# Patient Record
Sex: FEMALE | Race: WHITE | NOT HISPANIC OR LATINO | Employment: OTHER | URBAN - METROPOLITAN AREA
[De-identification: names, ages, dates, MRNs, and addresses within clinical notes are randomized per-mention and may not be internally consistent; named-entity substitution may affect disease eponyms.]

---

## 2017-07-27 PROBLEM — E66.01 SEVERE OBESITY (BMI >= 40): Status: ACTIVE | Noted: 2017-07-27

## 2017-07-27 PROBLEM — R07.82 INTERCOSTAL PAIN: Status: ACTIVE | Noted: 2017-07-27

## 2017-07-27 PROBLEM — J18.9 PNEUMONIA DUE TO INFECTIOUS ORGANISM: Status: ACTIVE | Noted: 2017-07-27

## 2017-07-27 PROBLEM — J18.9 COMMUNITY ACQUIRED PNEUMONIA: Status: RESOLVED | Noted: 2017-07-27 | Resolved: 2017-07-27

## 2017-07-27 PROBLEM — J44.9 COPD (CHRONIC OBSTRUCTIVE PULMONARY DISEASE): Status: ACTIVE | Noted: 2017-07-27

## 2017-07-27 PROBLEM — J18.9 COMMUNITY ACQUIRED PNEUMONIA: Status: ACTIVE | Noted: 2017-07-27

## 2017-07-29 PROBLEM — C34.91 ADENOCARCINOMA OF RIGHT LUNG: Status: ACTIVE | Noted: 2017-07-29

## 2017-12-14 PROBLEM — J18.9 PNEUMONIA OF LEFT LUNG DUE TO INFECTIOUS ORGANISM: Status: ACTIVE | Noted: 2017-12-14

## 2023-09-19 ENCOUNTER — HOSPITAL ENCOUNTER (OUTPATIENT)
Dept: RADIOLOGY | Facility: HOSPITAL | Age: 83
Discharge: HOME | End: 2023-09-19
Attending: EMERGENCY MEDICINE
Payer: MEDICARE

## 2023-09-19 DIAGNOSIS — J44.9 CHRONIC OBSTRUCTIVE PULMONARY DISEASE, UNSPECIFIED COPD TYPE: ICD-10-CM

## 2023-09-19 DIAGNOSIS — C34.92 ADENOCARCINOMA OF LEFT LUNG: ICD-10-CM

## 2023-09-19 DIAGNOSIS — R91.8 LUNG MASS: ICD-10-CM

## 2023-09-19 LAB — GLUCOSE SERPL-MCNC: 114 MG/DL (ref 70–110)

## 2023-09-19 PROCEDURE — A9552 F18 FDG: HCPCS | Mod: PN

## 2023-09-19 PROCEDURE — 78815 NM PET CT ROUTINE: ICD-10-PCS | Mod: 26,PI,, | Performed by: RADIOLOGY

## 2023-09-19 PROCEDURE — 78815 PET IMAGE W/CT SKULL-THIGH: CPT | Mod: 26,PI,, | Performed by: RADIOLOGY

## 2023-09-19 NOTE — PROGRESS NOTES
PET Imaging Questionnaire    Are you a Diabetic? Recent Blood Sugar level? No    Are you anemic? Bone Marrow Stimulation Meds? No    Have you had a CT Scan, if so when & where was your last one? Yes -     Have you had a PET Scan, if so when & where was your last one? Yes -     Chemotherapy or currently on Chemotherapy? No    Radiation therapy? Yes    Surgical History:   Past Surgical History:   Procedure Laterality Date    APPENDECTOMY      BACK SURGERY      CORONARY ANGIOPLASTY WITH STENT PLACEMENT      HYSTERECTOMY      OTHER SURGICAL HISTORY      Abdominal Aneurysm    TONSILLECTOMY          Have you been fasting for at least 6 hours? Yes    Is there any chance you may be pregnant or breastfeeding? No    Assay: 12.48 MCi@:10:36   Injection Site:RT Arm    Residual: .773 mCi@: 10:38   Technologist: Konstantin Rodriguez Injected:11.71 mCi

## 2024-02-21 PROBLEM — E66.01 SEVERE OBESITY (BMI >= 40): Status: RESOLVED | Noted: 2017-07-27 | Resolved: 2024-02-21

## 2024-07-09 PROBLEM — I50.32 CHRONIC HEART FAILURE WITH PRESERVED EJECTION FRACTION: Status: ACTIVE | Noted: 2024-07-09

## 2024-07-09 PROBLEM — R07.9 CHEST PAIN: Status: ACTIVE | Noted: 2024-07-09

## 2024-07-18 PROBLEM — I50.30 DIASTOLIC CONGESTIVE HEART FAILURE: Status: ACTIVE | Noted: 2024-07-18

## 2024-07-19 PROBLEM — R07.9 CHEST PAIN: Status: RESOLVED | Noted: 2024-07-09 | Resolved: 2024-07-19

## 2024-08-08 PROBLEM — F33.41 RECURRENT MAJOR DEPRESSIVE DISORDER, IN PARTIAL REMISSION: Status: ACTIVE | Noted: 2024-08-08

## 2024-08-21 PROBLEM — M41.9 SCOLIOSIS OF THORACOLUMBAR SPINE: Status: ACTIVE | Noted: 2024-08-21

## 2024-12-01 ENCOUNTER — HOSPITAL ENCOUNTER (EMERGENCY)
Facility: HOSPITAL | Age: 84
Discharge: HOME | End: 2024-12-01
Attending: EMERGENCY MEDICINE
Payer: MEDICARE

## 2024-12-01 VITALS
BODY MASS INDEX: 28.27 KG/M2 | OXYGEN SATURATION: 96 % | WEIGHT: 169.69 LBS | DIASTOLIC BLOOD PRESSURE: 62 MMHG | TEMPERATURE: 98 F | HEIGHT: 65 IN | SYSTOLIC BLOOD PRESSURE: 134 MMHG | RESPIRATION RATE: 18 BRPM | HEART RATE: 76 BPM

## 2024-12-01 DIAGNOSIS — R53.1 WEAKNESS: ICD-10-CM

## 2024-12-01 DIAGNOSIS — C34.92 PRIMARY ADENOCARCINOMA OF LEFT LUNG: ICD-10-CM

## 2024-12-01 DIAGNOSIS — S32.030A COMPRESSION FRACTURE OF L3 VERTEBRA, INITIAL ENCOUNTER: Primary | ICD-10-CM

## 2024-12-01 DIAGNOSIS — G89.29 CHRONIC MIDLINE BACK PAIN, UNSPECIFIED BACK LOCATION: ICD-10-CM

## 2024-12-01 DIAGNOSIS — M54.9 CHRONIC MIDLINE BACK PAIN, UNSPECIFIED BACK LOCATION: ICD-10-CM

## 2024-12-01 LAB
ALBUMIN SERPL BCP-MCNC: 3.5 G/DL (ref 3.5–5.2)
ALP SERPL-CCNC: 127 U/L (ref 40–150)
ALT SERPL W/O P-5'-P-CCNC: 6 U/L (ref 10–44)
ANION GAP SERPL CALC-SCNC: 12 MMOL/L (ref 8–16)
AST SERPL-CCNC: 16 U/L (ref 10–40)
BACTERIA #/AREA URNS HPF: ABNORMAL /HPF
BASOPHILS # BLD AUTO: 0.03 K/UL (ref 0–0.2)
BASOPHILS NFR BLD: 0.2 % (ref 0–1.9)
BILIRUB SERPL-MCNC: 1.1 MG/DL (ref 0.1–1)
BILIRUB UR QL STRIP: NEGATIVE
BUN SERPL-MCNC: 24 MG/DL (ref 8–23)
CALCIUM SERPL-MCNC: 9.1 MG/DL (ref 8.7–10.5)
CHLORIDE SERPL-SCNC: 104 MMOL/L (ref 95–110)
CLARITY UR: ABNORMAL
CO2 SERPL-SCNC: 20 MMOL/L (ref 23–29)
COLOR UR: YELLOW
CREAT SERPL-MCNC: 1.1 MG/DL (ref 0.5–1.4)
CRP SERPL-MCNC: 118.2 MG/L (ref 0–8.2)
DIFFERENTIAL METHOD BLD: ABNORMAL
EOSINOPHIL # BLD AUTO: 0.1 K/UL (ref 0–0.5)
EOSINOPHIL NFR BLD: 0.8 % (ref 0–8)
ERYTHROCYTE [DISTWIDTH] IN BLOOD BY AUTOMATED COUNT: 14.8 % (ref 11.5–14.5)
EST. GFR  (NO RACE VARIABLE): 49 ML/MIN/1.73 M^2
GLUCOSE SERPL-MCNC: 170 MG/DL (ref 70–110)
GLUCOSE UR QL STRIP: NEGATIVE
HCT VFR BLD AUTO: 39 % (ref 37–48.5)
HCV AB SERPL QL IA: NEGATIVE
HEP C VIRUS HOLD SPECIMEN: NORMAL
HGB BLD-MCNC: 12.8 G/DL (ref 12–16)
HGB UR QL STRIP: NEGATIVE
HIV 1+2 AB+HIV1 P24 AG SERPL QL IA: NEGATIVE
IMM GRANULOCYTES # BLD AUTO: 0.08 K/UL (ref 0–0.04)
IMM GRANULOCYTES NFR BLD AUTO: 0.6 % (ref 0–0.5)
KETONES UR QL STRIP: NEGATIVE
LEUKOCYTE ESTERASE UR QL STRIP: ABNORMAL
LYMPHOCYTES # BLD AUTO: 0.3 K/UL (ref 1–4.8)
LYMPHOCYTES NFR BLD: 2.3 % (ref 18–48)
MCH RBC QN AUTO: 26.9 PG (ref 27–31)
MCHC RBC AUTO-ENTMCNC: 32.8 G/DL (ref 32–36)
MCV RBC AUTO: 82 FL (ref 82–98)
MICROSCOPIC COMMENT: ABNORMAL
MONOCYTES # BLD AUTO: 0.6 K/UL (ref 0.3–1)
MONOCYTES NFR BLD: 4.3 % (ref 4–15)
NEUTROPHILS # BLD AUTO: 13 K/UL (ref 1.8–7.7)
NEUTROPHILS NFR BLD: 91.8 % (ref 38–73)
NITRITE UR QL STRIP: NEGATIVE
NRBC BLD-RTO: 0 /100 WBC
PH UR STRIP: 6 [PH] (ref 5–8)
PLATELET # BLD AUTO: 164 K/UL (ref 150–450)
PMV BLD AUTO: 10.2 FL (ref 9.2–12.9)
POTASSIUM SERPL-SCNC: 4.2 MMOL/L (ref 3.5–5.1)
PROT SERPL-MCNC: 6.9 G/DL (ref 6–8.4)
PROT UR QL STRIP: ABNORMAL
RBC # BLD AUTO: 4.76 M/UL (ref 4–5.4)
RBC #/AREA URNS HPF: 6 /HPF (ref 0–4)
SODIUM SERPL-SCNC: 136 MMOL/L (ref 136–145)
SP GR UR STRIP: 1.02 (ref 1–1.03)
SQUAMOUS #/AREA URNS HPF: 14 /HPF
URN SPEC COLLECT METH UR: ABNORMAL
UROBILINOGEN UR STRIP-ACNC: NEGATIVE EU/DL
WBC # BLD AUTO: 14.14 K/UL (ref 3.9–12.7)
WBC #/AREA URNS HPF: 33 /HPF (ref 0–5)

## 2024-12-01 PROCEDURE — 96375 TX/PRO/DX INJ NEW DRUG ADDON: CPT

## 2024-12-01 PROCEDURE — 25500020 PHARM REV CODE 255: Performed by: FAMILY MEDICINE

## 2024-12-01 PROCEDURE — 96374 THER/PROPH/DIAG INJ IV PUSH: CPT

## 2024-12-01 PROCEDURE — 99285 EMERGENCY DEPT VISIT HI MDM: CPT | Mod: 25

## 2024-12-01 PROCEDURE — 86803 HEPATITIS C AB TEST: CPT | Performed by: EMERGENCY MEDICINE

## 2024-12-01 PROCEDURE — 87086 URINE CULTURE/COLONY COUNT: CPT | Performed by: EMERGENCY MEDICINE

## 2024-12-01 PROCEDURE — 63600175 PHARM REV CODE 636 W HCPCS: Performed by: EMERGENCY MEDICINE

## 2024-12-01 PROCEDURE — 87389 HIV-1 AG W/HIV-1&-2 AB AG IA: CPT | Performed by: EMERGENCY MEDICINE

## 2024-12-01 PROCEDURE — 63600175 PHARM REV CODE 636 W HCPCS: Performed by: FAMILY MEDICINE

## 2024-12-01 PROCEDURE — 85025 COMPLETE CBC W/AUTO DIFF WBC: CPT | Performed by: EMERGENCY MEDICINE

## 2024-12-01 PROCEDURE — 86140 C-REACTIVE PROTEIN: CPT | Performed by: EMERGENCY MEDICINE

## 2024-12-01 PROCEDURE — 81000 URINALYSIS NONAUTO W/SCOPE: CPT | Performed by: EMERGENCY MEDICINE

## 2024-12-01 PROCEDURE — 80053 COMPREHEN METABOLIC PANEL: CPT | Performed by: EMERGENCY MEDICINE

## 2024-12-01 RX ORDER — PROMETHAZINE HYDROCHLORIDE 25 MG/ML
12.5 INJECTION, SOLUTION INTRAMUSCULAR; INTRAVENOUS
Status: DISCONTINUED | OUTPATIENT
Start: 2024-12-01 | End: 2024-12-01

## 2024-12-01 RX ORDER — KETOROLAC TROMETHAMINE 30 MG/ML
15 INJECTION, SOLUTION INTRAMUSCULAR; INTRAVENOUS
Status: COMPLETED | OUTPATIENT
Start: 2024-12-01 | End: 2024-12-01

## 2024-12-01 RX ORDER — HYDROCODONE BITARTRATE AND ACETAMINOPHEN 10; 325 MG/1; MG/1
1 TABLET ORAL EVERY 6 HOURS PRN
Qty: 12 TABLET | Refills: 0 | Status: SHIPPED | OUTPATIENT
Start: 2024-12-01 | End: 2024-12-02 | Stop reason: ALTCHOICE

## 2024-12-01 RX ORDER — MORPHINE SULFATE 4 MG/ML
6 INJECTION, SOLUTION INTRAMUSCULAR; INTRAVENOUS
Status: COMPLETED | OUTPATIENT
Start: 2024-12-01 | End: 2024-12-01

## 2024-12-01 RX ORDER — METHYLPREDNISOLONE SOD SUCC 125 MG
125 VIAL (EA) INJECTION
Status: COMPLETED | OUTPATIENT
Start: 2024-12-01 | End: 2024-12-01

## 2024-12-01 RX ORDER — HYDROCODONE BITARTRATE AND ACETAMINOPHEN 10; 325 MG/1; MG/1
1 TABLET ORAL
Status: DISCONTINUED | OUTPATIENT
Start: 2024-12-01 | End: 2024-12-01

## 2024-12-01 RX ORDER — MORPHINE SULFATE 4 MG/ML
8 INJECTION, SOLUTION INTRAMUSCULAR; INTRAVENOUS
Status: DISCONTINUED | OUTPATIENT
Start: 2024-12-01 | End: 2024-12-01

## 2024-12-01 RX ORDER — ONDANSETRON HYDROCHLORIDE 2 MG/ML
4 INJECTION, SOLUTION INTRAVENOUS
Status: COMPLETED | OUTPATIENT
Start: 2024-12-01 | End: 2024-12-01

## 2024-12-01 RX ADMIN — MORPHINE SULFATE 6 MG: 4 INJECTION INTRAVENOUS at 03:12

## 2024-12-01 RX ADMIN — ONDANSETRON 4 MG: 2 INJECTION INTRAMUSCULAR; INTRAVENOUS at 03:12

## 2024-12-01 RX ADMIN — METHYLPREDNISOLONE SODIUM SUCCINATE 125 MG: 125 INJECTION, POWDER, FOR SOLUTION INTRAMUSCULAR; INTRAVENOUS at 10:12

## 2024-12-01 RX ADMIN — KETOROLAC TROMETHAMINE 15 MG: 30 INJECTION, SOLUTION INTRAMUSCULAR at 08:12

## 2024-12-01 RX ADMIN — IOHEXOL 100 ML: 350 INJECTION, SOLUTION INTRAVENOUS at 07:12

## 2024-12-01 NOTE — ED PROVIDER NOTES
SCRIBE #1 NOTE: IGlo, am scribing for, and in the presence of, John An MD. I have scribed the HPI, ROS, PEx.     SCRIBE #2 NOTE: IJose, am scribing for, and in the presence of,  Nusrat Barbosa MD. I have scribed the remaining portions of the note not scribed by Scribe #1.      History     Chief Complaint   Patient presents with    Leg Pain     Pt complaining of chronic bilateral leg pain. Pt's family reports supposed to be in pain management for steroid injections however miscommunication from PCP and pain management so x1 month and still no relief      Review of patient's allergies indicates:   Allergen Reactions    Codeine Nausea Only    Adhesive Itching and Blisters     tape  tape    Latex, natural rubber Itching         History of Present Illness     HPI    12/1/2024, 3:16 AM  History obtained from the patient      History of Present Illness: Katrina Duran is a 84 y.o. female patient with a PMHx of adenocarcinoma of left lung, CAD, heart attack, abdominal aortic aneurysm s/p stent placement, COPD, and HTN who presents to the Emergency Department for evaluation of constant, moderate lumbar back pain that radiates to the BLE which onset several months PTA, worse over the past few days. This pain is exacerbated with standing or walking. The patient has not been able to walk over the past 5 days. She denies any falls or trauma. She has seen her PCP, an Orthopedist, and Pain Management for this complaint. She has prescriptions for Gabapentin and Tramadol, which have not been helping. She has a follow-up appointment with Pain Management next week. Patient denies any bowel or bladder incontinence, dysuria, CP, and all other sxs at this time. No further complaints or concerns at this time.       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    COPD (chronic obstructive pulmonary disease)     Coronary artery disease     Essential (primary) hypertension 12/15/2005    Heart attack         Mass of lower lobe of right lung 2016    Simple chronic bronchitis 2016       Past Surgical History:  Past Surgical History:   Procedure Laterality Date    APPENDECTOMY      BACK SURGERY      CORONARY ANGIOPLASTY WITH STENT PLACEMENT      HYSTERECTOMY      OTHER SURGICAL HISTORY      Abdominal Aneurysm    TONSILLECTOMY           Family History:  Family History   Problem Relation Name Age of Onset    Cancer Sister         Social History:  Social History     Tobacco Use    Smoking status: Former     Current packs/day: 0.00     Types: Cigarettes     Quit date: 1999     Years since quittin.9    Smokeless tobacco: Never   Substance and Sexual Activity    Alcohol use: No    Drug use: No    Sexual activity: Never        Review of Systems     Review of Systems   Constitutional:  Negative for fever.   HENT:  Negative for sore throat.    Respiratory:  Negative for shortness of breath.    Cardiovascular:  Negative for chest pain.   Gastrointestinal:  Negative for nausea.        [-] bowel incontinence   Genitourinary:  Negative for dysuria.        [-] bladder incontinence   Musculoskeletal:  Positive for back pain (lumbar).   Skin:  Negative for rash.   Neurological:  Negative for weakness.   Hematological:  Does not bruise/bleed easily.   All other systems reviewed and are negative.     Physical Exam     Initial Vitals [24 0257]   BP Pulse Resp Temp SpO2   (!) 165/76 93 18 98 °F (36.7 °C) 97 %      MAP       --          Physical Exam  Nursing Notes and Vital Signs Reviewed.  Constitutional: Patient is in moderate distress. Well-developed and well-nourished.  Head: Atraumatic. Normocephalic.  Eyes: PERRL. EOM intact. Conjunctivae are not  pale. No scleral icterus.  ENT: Mucous membranes are moist. Oropharynx is clear and symmetric.    Neck: Supple. Full ROM. No lymphadenopathy.  Cardiovascular: Regular rate. Regular rhythm. No murmurs, rubs, or gallops. Distal pulses are 2+ and symmetric.  Pulmonary/Chest: No respiratory distress. Clear to auscultation bilaterally. No wheezing or rales.  Abdominal: Soft and non-distended.  There is no tenderness.  No rebound, guarding, or rigidity. Good bowel sounds.  Genitourinary: No CVA tenderness  Musculoskeletal: Moves all extremities. No obvious deformities. No edema. No calf tenderness.  Skin: Warm and dry.  Neurological:  Alert, awake, and appropriate.  Normal speech.  Positive straight leg raise on the right. No strength deficit; equal and 5/5 in bilateral upper and lower extremities. No acute focal neurological deficits are appreciated.  Psychiatric: Normal affect. Good eye contact. Appropriate in content.    Unable to perform back exam as the patient is unable to sit up or turn over due to pain.      ED Course   Critical Care    Date/Time: 12/1/2024 10:18 AM    Performed by: Nusrat Barbosa MD  Authorized by: Nusrat Barbosa MD  Direct patient critical care time: 20 minutes  Additional history critical care time: 12 minutes  Ordering / reviewing critical care time: 12 minutes  Documentation critical care time: 9 minutes  Consulting other physicians critical care time: 7 minutes  Total critical care time (exclusive of procedural time) : 60 minutes  Critical care time was exclusive of separately billable procedures and treating other patients and teaching time.  Critical care was necessary to treat or prevent imminent or life-threatening deterioration of the following conditions: compression fracture.  Critical care was time spent personally by me on the following activities: blood draw for specimens, development of treatment plan with patient or surrogate, discussions with consultants,  "interpretation of cardiac output measurements, evaluation of patient's response to treatment, examination of patient, obtaining history from patient or surrogate, ordering and performing treatments and interventions, ordering and review of laboratory studies, ordering and review of radiographic studies, pulse oximetry, re-evaluation of patient's condition and review of old charts.        ED Vital Signs:  Vitals:    12/01/24 0257 12/01/24 0332 12/01/24 0334 12/01/24 0358   BP: (!) 165/76   (!) 179/74   Pulse: 93   87   Resp: 18  20    Temp: 98 °F (36.7 °C)      TempSrc: Oral      SpO2: 97%   (!) 93%   Weight:  77 kg (169 lb 11.2 oz)     Height: 5' 5" (1.651 m)       12/01/24 0530 12/01/24 0803 12/01/24 0852 12/01/24 0853   BP: (!) 142/65 (!) 178/72  (!) 177/70   Pulse: 82 70  76   Resp:  19  18   Temp:  98.2 °F (36.8 °C)     TempSrc:  Oral     SpO2: (!) 93% (!) 93% (S) (!) 88% (!) 91%   Weight:       Height:        12/01/24 0900 12/01/24 1007   BP: (!) 158/70 134/62   Pulse: 78 76   Resp: 19 18   Temp: 98 °F (36.7 °C)    TempSrc: Oral    SpO2: 95% 96%   Weight:     Height:         Abnormal Lab Results:  Labs Reviewed   CBC W/ AUTO DIFFERENTIAL - Abnormal       Result Value    WBC 14.14 (*)     RBC 4.76      Hemoglobin 12.8      Hematocrit 39.0      MCV 82      MCH 26.9 (*)     MCHC 32.8      RDW 14.8 (*)     Platelets 164      MPV 10.2      Immature Granulocytes 0.6 (*)     Gran # (ANC) 13.0 (*)     Immature Grans (Abs) 0.08 (*)     Lymph # 0.3 (*)     Mono # 0.6      Eos # 0.1      Baso # 0.03      nRBC 0      Gran % 91.8 (*)     Lymph % 2.3 (*)     Mono % 4.3      Eosinophil % 0.8      Basophil % 0.2      Differential Method Automated     COMPREHENSIVE METABOLIC PANEL - Abnormal    Sodium 136      Potassium 4.2      Chloride 104      CO2 20 (*)     Glucose 170 (*)     BUN 24 (*)     Creatinine 1.1      Calcium 9.1      Total Protein 6.9      Albumin 3.5      Total Bilirubin 1.1 (*)     Alkaline Phosphatase 127      " AST 16      ALT 6 (*)     eGFR 49 (*)     Anion Gap 12     URINALYSIS, REFLEX TO URINE CULTURE - Abnormal    Specimen UA Urine, Clean Catch      Color, UA Yellow      Appearance, UA Hazy (*)     pH, UA 6.0      Specific Gravity, UA 1.020      Protein, UA Trace (*)     Glucose, UA Negative      Ketones, UA Negative      Bilirubin (UA) Negative      Occult Blood UA Negative      Nitrite, UA Negative      Urobilinogen, UA Negative      Leukocytes, UA 3+ (*)     Narrative:     Specimen Source->Urine   C-REACTIVE PROTEIN - Abnormal    .2 (*)    URINALYSIS MICROSCOPIC - Abnormal    RBC, UA 6 (*)     WBC, UA 33 (*)     Bacteria Rare      Squam Epithel, UA 14      Microscopic Comment SEE COMMENT      Narrative:     Specimen Source->Urine   CULTURE, URINE    Urine Culture, Routine        Value: Multiple organisms isolated. None in predominance.  Repeat if    Urine Culture, Routine clinically necessary.      Narrative:     Specimen Source->Urine   HEPATITIS C ANTIBODY    Hepatitis C Ab Negative      Narrative:     Release to patient->Immediate   HEP C VIRUS HOLD SPECIMEN    HEP C Virus Hold Specimen Hold for HCV sendout      Narrative:     Release to patient->Immediate   HIV 1 / 2 ANTIBODY    HIV 1/2 Ag/Ab Negative      Narrative:     Release to patient->Immediate        All Lab Results:  Results for orders placed or performed during the hospital encounter of 12/01/24   Hepatitis C Antibody    Collection Time: 12/01/24  3:36 AM   Result Value Ref Range    Hepatitis C Ab Negative Negative   HCV Virus Hold Specimen    Collection Time: 12/01/24  3:36 AM   Result Value Ref Range    HEP C Virus Hold Specimen Hold for HCV sendout    HIV 1/2 Ag/Ab (4th Gen)    Collection Time: 12/01/24  3:36 AM   Result Value Ref Range    HIV 1/2 Ag/Ab Negative Negative   CBC auto differential    Collection Time: 12/01/24  3:36 AM   Result Value Ref Range    WBC 14.14 (H) 3.90 - 12.70 K/uL    RBC 4.76 4.00 - 5.40 M/uL    Hemoglobin 12.8 12.0  - 16.0 g/dL    Hematocrit 39.0 37.0 - 48.5 %    MCV 82 82 - 98 fL    MCH 26.9 (L) 27.0 - 31.0 pg    MCHC 32.8 32.0 - 36.0 g/dL    RDW 14.8 (H) 11.5 - 14.5 %    Platelets 164 150 - 450 K/uL    MPV 10.2 9.2 - 12.9 fL    Immature Granulocytes 0.6 (H) 0.0 - 0.5 %    Gran # (ANC) 13.0 (H) 1.8 - 7.7 K/uL    Immature Grans (Abs) 0.08 (H) 0.00 - 0.04 K/uL    Lymph # 0.3 (L) 1.0 - 4.8 K/uL    Mono # 0.6 0.3 - 1.0 K/uL    Eos # 0.1 0.0 - 0.5 K/uL    Baso # 0.03 0.00 - 0.20 K/uL    nRBC 0 0 /100 WBC    Gran % 91.8 (H) 38.0 - 73.0 %    Lymph % 2.3 (L) 18.0 - 48.0 %    Mono % 4.3 4.0 - 15.0 %    Eosinophil % 0.8 0.0 - 8.0 %    Basophil % 0.2 0.0 - 1.9 %    Differential Method Automated    Comprehensive metabolic panel    Collection Time: 12/01/24  3:36 AM   Result Value Ref Range    Sodium 136 136 - 145 mmol/L    Potassium 4.2 3.5 - 5.1 mmol/L    Chloride 104 95 - 110 mmol/L    CO2 20 (L) 23 - 29 mmol/L    Glucose 170 (H) 70 - 110 mg/dL    BUN 24 (H) 8 - 23 mg/dL    Creatinine 1.1 0.5 - 1.4 mg/dL    Calcium 9.1 8.7 - 10.5 mg/dL    Total Protein 6.9 6.0 - 8.4 g/dL    Albumin 3.5 3.5 - 5.2 g/dL    Total Bilirubin 1.1 (H) 0.1 - 1.0 mg/dL    Alkaline Phosphatase 127 40 - 150 U/L    AST 16 10 - 40 U/L    ALT 6 (L) 10 - 44 U/L    eGFR 49 (A) >60 mL/min/1.73 m^2    Anion Gap 12 8 - 16 mmol/L   C-reactive protein    Collection Time: 12/01/24  3:36 AM   Result Value Ref Range    .2 (H) 0.0 - 8.2 mg/L   Urine culture    Collection Time: 12/01/24  7:56 AM    Specimen: Urine   Result Value Ref Range    Urine Culture, Routine       Multiple organisms isolated. None in predominance.  Repeat if    Urine Culture, Routine clinically necessary.    Urinalysis, Reflex to Urine Culture Urine, Clean Catch    Collection Time: 12/01/24  7:56 AM    Specimen: Urine   Result Value Ref Range    Specimen UA Urine, Clean Catch     Color, UA Yellow Yellow, Straw, Lexi    Appearance, UA Hazy (A) Clear    pH, UA 6.0 5.0 - 8.0    Specific Gravity, UA  1.020 1.005 - 1.030    Protein, UA Trace (A) Negative    Glucose, UA Negative Negative    Ketones, UA Negative Negative    Bilirubin (UA) Negative Negative    Occult Blood UA Negative Negative    Nitrite, UA Negative Negative    Urobilinogen, UA Negative <2.0 EU/dL    Leukocytes, UA 3+ (A) Negative   Urinalysis Microscopic    Collection Time: 12/01/24  7:56 AM   Result Value Ref Range    RBC, UA 6 (H) 0 - 4 /hpf    WBC, UA 33 (H) 0 - 5 /hpf    Bacteria Rare None-Occ /hpf    Squam Epithel, UA 14 /hpf    Microscopic Comment SEE COMMENT          Imaging Results:  Imaging Results              CTA Chest Abdomen Pelvis (Final result)  Result time 12/01/24 08:14:36      Final result by Rich Torres MD (12/01/24 08:14:36)                   Impression:      1.  Detrimental change.  Interval increase in angular opacity within the lingula, likely a combination of partial collapse with possible underlying mass.  Interval increase in size of semi-solid right lower lobe nodule with increase central solid component, currently measuring 4.2 cm.  Interval development of a 7 mm spiculated nodule in the right lung apex.  Interval development of at least 2 hepatic masses measuring up to 3.9 cm.  These changes must be considered neoplastic in origin until proven otherwise.  A repeat PET CT scan would be helpful.    2.  Negative for acute process involving the rest of the visualized osseous structures, considering the changes described on the recent lumbar spine CT scan performed earlier the same day.  Please see that report for details.    3.  Negative for acute vascular abnormalities.  The appearance of the aortic stent graft and surrounding soft tissues is unchanged.  Redemonstration of AV fistula centered in the left groin.    4.  Negative for pulmonary embolism, aneurysm or dissection.  Negative for intraperitoneal free air, free fluid or hemoperitoneum.    5.  Mildly increased stool burden.    6.  Mildly distended urinary  bladder.  Bladder outlet obstruction symptoms?    7.  Numerous stable findings as noted above.    All CT scans at this facility are performed  using dose modulation techniques as appropriate to performed exam including the following:  automated exposure control; adjustment of mA and/or kV according to the patients size (this includes techniques or standardized protocols for targeted exams where dose is matched to indication/reason for exam: i.e. extremities or head);  iterative reconstruction technique.      Electronically signed by: Rich Torres MD  Date:    12/01/2024  Time:    08:14               Narrative:    EXAMINATION:  CTA CHEST ABDOMEN PELVIS, multiplanar reconstructions    CLINICAL HISTORY:  Aortic aneurysm, known or suspected;    TECHNIQUE:  Axial images through the chest, abdomen and pelvis were obtained with the use of IV contrast.  Sagittal, coronal and 3D MIP reconstructions are provided for review and permanently archived.  Oral contrast was not utilized.    COMPARISON:  September 21, 2023 abdomen CTA, September 19 2023 PET-CT scan    FINDINGS:  VASCULATURE:    The thoracic aorta is intact without evidence for aneurysm or dissection.  Moderate atherosclerotic calcifications noted.  Postoperative changes are seen along the anterior portions of the ascending aorta consistent with prior CABG.  Extensive coronary artery calcifications.  Mitral valve plane calcifications again seen.    The pulmonary vasculature is normal without evidence for pulmonary emboli.  The cardiac chambers are enlarged.    The aortic stent graft is patent.  Slightly worsening mural thrombus along the left side of the proximal stent graft noted.  The thickness of the aneurysm surrounding the stent graft measures up to 1.1 cm, not significantly changed from the comparison study.  Where 2 pieces of the stent graft meet, there appears to be some contrast between the margins (reference image 545 of series 3).  This is unchanged from  the comparison study as well.  Negative for extravasation of contrast.    Again seen are changes of an AV fistula in the left groin with asymmetric contrast filling of the left femoral and iliac venous system as compared to the right.    CHEST: Interval progression of the amount of collapsed middle lobe/lingula.  The ground-glass/semi-solid opacity within the right lower lobe is increased in size in the interim, currently measuring 4.2 cm, with increased central solid component.  There is been interval development of a 7 mm spiculated nodule within the right lung apex as measured on axial image 147.  Stable middle lobe ground-glass opacity.  Stable inferior medial middle lobe scarring.  Stable elevation of the left hemidiaphragm.  Lungs are otherwise free of new pulmonary opacities.  Negative for pleural or pericardial effusions.    Negative for enlarged hilar, mediastinal, axillary or supraclavicular lymph nodes.    The airways are patent.  Densely rim calcified left thyroid nodule measuring 2.5 cm again seen.  The thyroid gland is otherwise normal.  The esophagus is normal.  Small to moderate hiatal hernia again seen.    ABDOMEN: There is been interval development of multiple inferior right hepatic lobe masses measuring up to 3.9 cm on axial image 527.  Atrophy of the left kidney again seen.  Stable 1.4 cm hyperdense nodule in the mid posterior right kidney.  Splenomegaly again seen.  The solid organs are otherwise unchanged in appearance.  The gallbladder and biliary tree are normal.    Stable prominent bishop hepatis lymph nodes.  Negative for ascites or inflammatory changes.    Increased stool burden.  Colonic interposition.  The patient has a mobile cecum.    I do not see a normal or an abnormal appendix.  Negative for free air.    PELVIS:  The urinary bladder is mildly distended.  The uterus is absent.  The rest of the female pelvic organs are normal.    Please see the lumbar spine CT scan performed the same  day for details of the lower thoracic and lumbar region.  The lower cervical and upper thoracic spine is unchanged in appearance when compared to July 18, 2023 chest CT scan.  Stable median sternotomy wires.  The sternum is intact.    Shoddy groin lymph nodes again seen.    Laxity of the linea alba with fat filled umbilical hernia again seen.                                       CT Lumbar Spine Without Contrast (Final result)  Result time 12/01/24 07:26:18      Final result by Rich Torres MD (12/01/24 07:26:18)                   Impression:      1.  Findings concerning for a subtle nondisplaced fracture involving the inferior, posterior and left lateral corner of the L3 vertebral body.    2.  Negative for acute process otherwise involving the lumbar spine.  Marked degenerative disc changes and degenerative facet arthropathy noted diffusely with marked convex left curvature of the lumbar spine.  Multilevel, multifactorial moderate to severe spinal canal stenosis and nerve root foramen narrowing, most significantly involving the L3/L4 disc level.    3.  Aortic stent graft in place, with similar amount of mild indistinction surrounding the stent graft when compared to the prior study, of doubtful clinical significance.    4.  Other stable findings as noted above.  This includes a stable 1.4 cm hyperdense cyst in the right kidney, which is been stable for over 1 year.    5.  A call report was made to Dr. Barbosa at 07:25 hours on December 1, 2024.    All CT scans at this facility are performed  using dose modulation techniques as appropriate to performed exam including the following:  automated exposure control; adjustment of mA and/or kV according to the patients size (this includes techniques or standardized protocols for targeted exams where dose is matched to indication/reason for exam: i.e. extremities or head);  iterative reconstruction technique.      Electronically signed by: Rich Torres  MD  Date:    12/01/2024  Time:    07:26               Narrative:    EXAMINATION:  CT LUMBAR SPINE WITHOUT CONTRAST    CLINICAL HISTORY:  Low back pain, increased fracture risk;    TECHNIQUE:  Axial noncontrast CT scan of the lumbar spine with sagittal and coronal reconstructions.    COMPARISON:  Lumbar spine x-ray from August 8, 2024, abdomen and pelvis CT scan from September 21, 2023    FINDINGS:  Diffuse disc height reduction most significantly involving L4/L5.  Multilevel vacuum disc phenomenon throughout the lower thoracic and lumbar region again seen.  Marked anterior marginal spondylosis, especially the upper lumbar spine.  Degenerative facet arthropathy without spondylolisthesis at this time.  Marked convex left curvature of the lumbar spine.  Schmorl node formation involves the superior endplate of L4, as well as multiple thoracic levels progressive osteopenia.    There are findings indicative of a nondisplaced fracture involving the posterior, inferior and left lateral corner of the L3 vertebral body (reference coronal image 90 and sagittal image 108)..  The lumbar vertebrae otherwise reveal normal alignment, shape and bone density. The lumbar vertebra are otherwise intact without evidence of fracture or dislocation.  There is multilevel moderate to severe nerve root foramen narrowing from posterior spondylosis mainly.  The most significantly involve level is L3/L4, where the thecal sac is narrowed to 7 mm.  Multilevel, multifactorial nerve root foramen narrowing noted as well.    Aortic stent graft remains in place.  There is some indistinction surrounding the stent graft, similar to the comparison CT scan marked atrophy of the left kidney again seen.  1.4 cm posterior midpole right renal hyperdense cyst, unchanged.  The surrounding soft tissues are otherwise normal.                                       Radiology (Final result)  Result time 12/02/24 11:22:40      Final result by Unknown User (12/02/24  11:22:40)                                         Radiology (Final result)  Result time 12/03/24 13:16:13      Final result by Unknown User (12/03/24 13:16:13)                                       Type of Interpretation: Outside Written Report.  Radiology Procedure Done: Lumbar Spine CT.  Interpretation:   No acute fracture or subluxation of the lumbar spine.   Partially visualized infrarenal abdominal aortic aneurysm with endoluminal metallic stent. Irregular stranding around the abdominal aortic aneurysm. Acute pathology involving the abdominal aortic aneurysm is not excluded. If there is a high clinical suspicion for expanding or leaking aortic aneurysm, consider further evaluation with CT angiogram of the abdomen/pelvis.   Radiologist: Florentino Martinez MD  12/1/2024 03:52                   The Emergency Provider reviewed the vital signs and test results, which are outlined above.     ED Discussion     6:00 AM: Dr. An transfers care of patient to Dr. Barbosa pending imaging results.    7:07 AM: Discussed pt's case with Florentino Martinez MD (Diagnostic Radiology) who states pt has a hx of AAA repair with metal placed. MRI is not recommended for this pt at this time.     7:25 AM: Received a call from Dr Torres (Radiologist). Dr Torres reread the CT which shows a small fracture involving the inferior, posterior and left lateral corner of the L3 vertebral body that looks acute.     10:19 AM: Reassessed pt at this time. Discussed with pt all pertinent ED information and results. Discussed pt dx and plan of tx. Gave pt all f/u and return to the ED instructions. All questions and concerns were addressed at this time. Pt expresses understanding of information and instructions, and is comfortable with plan to discharge. Pt is stable for discharge.    I discussed with patient and/or family/caretaker that evaluation in the ED does not suggest any emergent or life threatening medical conditions requiring immediate  intervention beyond what was provided in the ED, and I believe patient is safe for discharge.  Regardless, an unremarkable evaluation in the ED does not preclude the development or presence of a serious of life threatening condition. As such, patient was instructed to return immediately for any worsening or change in current symptoms.         Medical Decision Making  Patient handed off to me with c/o lumbar pain.  CT l-spine, Partially visualized infrarenal abdominal aortic aneurysm with endoluminal metallic stent. Irregular stranding around the abdominal aortic aneurysm. Acute pathology involving the abdominal aortic aneurysm is not excluded. If there is a high clinical suspicion for expanding or leaking aortic aneurysm, consider further evaluation with CT angiogram of the abdomen/pelvis.  CT overread this am showed nondisplaced fracture involving the posterior, inferior and left lateral corner of the L3 vertebral body, as relayed to me by radiologist.   MRI had been ordered overnight of l-spine to evaluate for infection as  and WBC 14 but MRI was refused due AAA repair and metal mesh.  CTA abdomen pelvis ordered to evaluate AAA irregular stranding, and was negative for leaking or extravasation.    Amount and/or Complexity of Data Reviewed  External Data Reviewed: labs, radiology, ECG and notes.  Labs: ordered. Decision-making details documented in ED Course.  Radiology: ordered and independent interpretation performed. Decision-making details documented in ED Course.  ECG/medicine tests: ordered and independent interpretation performed. Decision-making details documented in ED Course.    Risk  OTC drugs.  Prescription drug management.  Decision regarding hospitalization.    Critical Care  Total time providing critical care: 60 minutes                ED Medication(s):  Medications   morphine injection 6 mg (6 mg Intravenous Given 12/1/24 0334)   ondansetron injection 4 mg (4 mg Intravenous Given 12/1/24  0333)   iohexoL (OMNIPAQUE 350) injection 100 mL (100 mLs Intravenous Given 12/1/24 0748)   ketorolac injection 15 mg (15 mg Intravenous Given 12/1/24 0851)   methylPREDNISolone sodium succinate injection 125 mg (125 mg Intravenous Given 12/1/24 1030)       Discharge Medication List as of 12/1/2024 10:18 AM        START taking these medications    Details   HYDROcodone-acetaminophen (NORCO)  mg per tablet Take 1 tablet by mouth every 6 (six) hours as needed., Starting Sun 12/1/2024, Print              Follow-up Information       Schedule an appointment as soon as possible for a visit  with Liliya Freitas DO.    Specialty: Family Medicine  Contact information:  [address] [place] [id number]  [phone]                                 Scribe Attestation:   Scribe #1: I performed the above scribed service and the documentation accurately describes the services I performed. I attest to the accuracy of the note.     Attending:   Physician Attestation Statement for Scribe #1: I, John An MD, personally performed the services described in this documentation, as scribed by Glo Hogan, in my presence, and it is both accurate and complete.       Scribe Attestation:   Scribe #2: I performed the above scribed service and the documentation accurately describes the services I performed. I attest to the accuracy of the note.    Attending Attestation:           Physician Attestation for Scribe:    Physician Attestation Statement for Scribe #2: I, Nusrat Barbosa MD, reviewed documentation, as scribed by Jose Anaya in my presence, and it is both accurate and complete. I also acknowledge and confirm the content of the note done by Roseanneibe #1.           Clinical Impression       ICD-10-CM ICD-9-CM   1. Compression fracture of L3 vertebra, initial encounter  S32.030A 805.4   2. Primary adenocarcinoma of left lung  C34.92 162.9   3. Chronic midline back pain, unspecified back location  M54.9 724.5     G89.29 338.29   4. Weakness  R53.1 780.79       Disposition:   Disposition: Discharged  Condition: Stable         Nusrat Barbosa MD  12/05/24 1041

## 2024-12-02 ENCOUNTER — TELEPHONE (OUTPATIENT)
Dept: PAIN MEDICINE | Facility: CLINIC | Age: 84
End: 2024-12-02
Payer: MEDICARE

## 2024-12-02 ENCOUNTER — NURSE TRIAGE (OUTPATIENT)
Dept: ADMINISTRATIVE | Facility: CLINIC | Age: 84
End: 2024-12-02
Payer: MEDICARE

## 2024-12-02 ENCOUNTER — HOSPITAL ENCOUNTER (OUTPATIENT)
Dept: RADIATION THERAPY | Facility: HOSPITAL | Age: 84
Discharge: HOME | End: 2024-12-02
Attending: SPECIALIST
Payer: MEDICARE

## 2024-12-02 LAB
BACTERIA UR CULT: NORMAL
BACTERIA UR CULT: NORMAL

## 2024-12-02 NOTE — TELEPHONE ENCOUNTER
Patient has an appointment scheduled in [facility] for 12/05/2024 with Lauren Whiteside.     Carrie HUMPHREYS ([facility])

## 2024-12-02 NOTE — TELEPHONE ENCOUNTER
----- Message from Nikky sent at 12/2/2024  9:18 AM CST -----  Contact: Shannon/ daughter  .Patient is calling to speak with the nurse regarding  appt today . Reports is in pain and needing to be seen today . Please give patient a call back at   [phone]

## 2024-12-03 ENCOUNTER — HOSPITAL ENCOUNTER (INPATIENT)
Facility: HOSPITAL | Age: 84
LOS: 6 days | Discharge: HOSPICE | DRG: 947 | End: 2024-12-10
Attending: EMERGENCY MEDICINE | Admitting: HOSPITALIST
Payer: MEDICARE

## 2024-12-03 DIAGNOSIS — S32.038A OTHER CLOSED FRACTURE OF THIRD LUMBAR VERTEBRA, INITIAL ENCOUNTER: ICD-10-CM

## 2024-12-03 DIAGNOSIS — C34.90 NON-SMALL CELL LUNG CANCER, UNSPECIFIED LATERALITY: ICD-10-CM

## 2024-12-03 DIAGNOSIS — M54.50 LOW BACK PAIN: ICD-10-CM

## 2024-12-03 DIAGNOSIS — R06.00 DYSPNEA: ICD-10-CM

## 2024-12-03 DIAGNOSIS — Z51.5 ENCOUNTER FOR PALLIATIVE CARE: ICD-10-CM

## 2024-12-03 DIAGNOSIS — G89.3 NEOPLASM RELATED PAIN: ICD-10-CM

## 2024-12-03 DIAGNOSIS — C79.51 METASTASIS TO BONE: ICD-10-CM

## 2024-12-03 DIAGNOSIS — R11.2 NAUSEA AND VOMITING, UNSPECIFIED VOMITING TYPE: ICD-10-CM

## 2024-12-03 DIAGNOSIS — K76.9 LIVER LESION: ICD-10-CM

## 2024-12-03 DIAGNOSIS — S32.039A: ICD-10-CM

## 2024-12-03 DIAGNOSIS — R78.81 BACTEREMIA: ICD-10-CM

## 2024-12-03 DIAGNOSIS — M54.59 INTRACTABLE LOW BACK PAIN: Primary | ICD-10-CM

## 2024-12-03 DIAGNOSIS — C34.92 ADENOCARCINOMA OF LEFT LUNG: ICD-10-CM

## 2024-12-03 DIAGNOSIS — R91.8 MASS OF LEFT LUNG: ICD-10-CM

## 2024-12-03 DIAGNOSIS — R07.9 CHEST PAIN: ICD-10-CM

## 2024-12-03 PROBLEM — N39.0 UTI (URINARY TRACT INFECTION): Status: ACTIVE | Noted: 2024-12-03

## 2024-12-03 PROBLEM — N17.9 AKI (ACUTE KIDNEY INJURY): Status: ACTIVE | Noted: 2024-12-03

## 2024-12-03 LAB
ALBUMIN SERPL BCP-MCNC: 3 G/DL (ref 3.5–5.2)
ALP SERPL-CCNC: 118 U/L (ref 40–150)
ALT SERPL W/O P-5'-P-CCNC: 11 U/L (ref 10–44)
ANION GAP SERPL CALC-SCNC: 13 MMOL/L (ref 8–16)
APTT PPP: 22.6 SEC (ref 21–32)
AST SERPL-CCNC: 23 U/L (ref 10–40)
BASOPHILS # BLD AUTO: 0.02 K/UL (ref 0–0.2)
BASOPHILS NFR BLD: 0.2 % (ref 0–1.9)
BILIRUB SERPL-MCNC: 0.9 MG/DL (ref 0.1–1)
BNP SERPL-MCNC: 1873 PG/ML (ref 0–99)
BUN SERPL-MCNC: 43 MG/DL (ref 8–23)
CALCIUM SERPL-MCNC: 9.1 MG/DL (ref 8.7–10.5)
CHLORIDE SERPL-SCNC: 104 MMOL/L (ref 95–110)
CO2 SERPL-SCNC: 17 MMOL/L (ref 23–29)
CREAT SERPL-MCNC: 1.5 MG/DL (ref 0.5–1.4)
DIFFERENTIAL METHOD BLD: ABNORMAL
EOSINOPHIL # BLD AUTO: 0.3 K/UL (ref 0–0.5)
EOSINOPHIL NFR BLD: 2 % (ref 0–8)
ERYTHROCYTE [DISTWIDTH] IN BLOOD BY AUTOMATED COUNT: 15.2 % (ref 11.5–14.5)
EST. GFR  (NO RACE VARIABLE): 34 ML/MIN/1.73 M^2
GLUCOSE SERPL-MCNC: 142 MG/DL (ref 70–110)
HCT VFR BLD AUTO: 34.8 % (ref 37–48.5)
HGB BLD-MCNC: 11.1 G/DL (ref 12–16)
IMM GRANULOCYTES # BLD AUTO: 0.08 K/UL (ref 0–0.04)
IMM GRANULOCYTES NFR BLD AUTO: 0.6 % (ref 0–0.5)
INR PPP: 1.1 (ref 0.8–1.2)
LACTATE SERPL-SCNC: 1.5 MMOL/L (ref 0.5–2.2)
LACTATE SERPL-SCNC: 1.8 MMOL/L (ref 0.5–2.2)
LYMPHOCYTES # BLD AUTO: 0.2 K/UL (ref 1–4.8)
LYMPHOCYTES NFR BLD: 1.4 % (ref 18–48)
MCH RBC QN AUTO: 26.6 PG (ref 27–31)
MCHC RBC AUTO-ENTMCNC: 31.9 G/DL (ref 32–36)
MCV RBC AUTO: 83 FL (ref 82–98)
MONOCYTES # BLD AUTO: 0.5 K/UL (ref 0.3–1)
MONOCYTES NFR BLD: 3.9 % (ref 4–15)
NEUTROPHILS # BLD AUTO: 11.4 K/UL (ref 1.8–7.7)
NEUTROPHILS NFR BLD: 91.9 % (ref 38–73)
NRBC BLD-RTO: 0 /100 WBC
PLATELET # BLD AUTO: 130 K/UL (ref 150–450)
PLATELET BLD QL SMEAR: ABNORMAL
PMV BLD AUTO: 10.7 FL (ref 9.2–12.9)
POTASSIUM SERPL-SCNC: 4.4 MMOL/L (ref 3.5–5.1)
PROT SERPL-MCNC: 6.5 G/DL (ref 6–8.4)
PROTHROMBIN TIME: 11.9 SEC (ref 9–12.5)
RBC # BLD AUTO: 4.18 M/UL (ref 4–5.4)
SODIUM SERPL-SCNC: 134 MMOL/L (ref 136–145)
WBC # BLD AUTO: 12.39 K/UL (ref 3.9–12.7)

## 2024-12-03 PROCEDURE — 63600175 PHARM REV CODE 636 W HCPCS: Performed by: EMERGENCY MEDICINE

## 2024-12-03 PROCEDURE — 85610 PROTHROMBIN TIME: CPT | Performed by: EMERGENCY MEDICINE

## 2024-12-03 PROCEDURE — 96372 THER/PROPH/DIAG INJ SC/IM: CPT | Performed by: EMERGENCY MEDICINE

## 2024-12-03 PROCEDURE — 83880 ASSAY OF NATRIURETIC PEPTIDE: CPT | Performed by: EMERGENCY MEDICINE

## 2024-12-03 PROCEDURE — 80053 COMPREHEN METABOLIC PANEL: CPT | Performed by: EMERGENCY MEDICINE

## 2024-12-03 PROCEDURE — G0378 HOSPITAL OBSERVATION PER HR: HCPCS

## 2024-12-03 PROCEDURE — 85730 THROMBOPLASTIN TIME PARTIAL: CPT | Performed by: EMERGENCY MEDICINE

## 2024-12-03 PROCEDURE — 96374 THER/PROPH/DIAG INJ IV PUSH: CPT

## 2024-12-03 PROCEDURE — 25000003 PHARM REV CODE 250: Performed by: EMERGENCY MEDICINE

## 2024-12-03 PROCEDURE — 99285 EMERGENCY DEPT VISIT HI MDM: CPT | Mod: 25

## 2024-12-03 PROCEDURE — 85025 COMPLETE CBC W/AUTO DIFF WBC: CPT | Performed by: EMERGENCY MEDICINE

## 2024-12-03 PROCEDURE — 36415 COLL VENOUS BLD VENIPUNCTURE: CPT | Performed by: EMERGENCY MEDICINE

## 2024-12-03 PROCEDURE — A4216 STERILE WATER/SALINE, 10 ML: HCPCS | Performed by: EMERGENCY MEDICINE

## 2024-12-03 PROCEDURE — 83605 ASSAY OF LACTIC ACID: CPT | Mod: 91 | Performed by: EMERGENCY MEDICINE

## 2024-12-03 PROCEDURE — 96375 TX/PRO/DX INJ NEW DRUG ADDON: CPT

## 2024-12-03 RX ORDER — MORPHINE SULFATE 4 MG/ML
2 INJECTION, SOLUTION INTRAMUSCULAR; INTRAVENOUS EVERY 4 HOURS PRN
Status: DISCONTINUED | OUTPATIENT
Start: 2024-12-03 | End: 2024-12-04

## 2024-12-03 RX ORDER — TALC
6 POWDER (GRAM) TOPICAL NIGHTLY PRN
Status: DISCONTINUED | OUTPATIENT
Start: 2024-12-03 | End: 2024-12-10 | Stop reason: HOSPADM

## 2024-12-03 RX ORDER — PROMETHAZINE HYDROCHLORIDE 25 MG/1
25 TABLET ORAL EVERY 6 HOURS PRN
Status: DISCONTINUED | OUTPATIENT
Start: 2024-12-03 | End: 2024-12-10 | Stop reason: HOSPADM

## 2024-12-03 RX ORDER — NALOXONE HCL 0.4 MG/ML
0.02 VIAL (ML) INJECTION
Status: DISCONTINUED | OUTPATIENT
Start: 2024-12-03 | End: 2024-12-10 | Stop reason: HOSPADM

## 2024-12-03 RX ORDER — SODIUM CHLORIDE 9 MG/ML
INJECTION, SOLUTION INTRAVENOUS CONTINUOUS
Status: DISCONTINUED | OUTPATIENT
Start: 2024-12-03 | End: 2024-12-04

## 2024-12-03 RX ORDER — ACETAMINOPHEN 325 MG/1
650 TABLET ORAL EVERY 4 HOURS PRN
Status: DISCONTINUED | OUTPATIENT
Start: 2024-12-03 | End: 2024-12-10 | Stop reason: HOSPADM

## 2024-12-03 RX ORDER — POLYETHYLENE GLYCOL 3350 17 G/17G
17 POWDER, FOR SOLUTION ORAL DAILY PRN
Status: DISCONTINUED | OUTPATIENT
Start: 2024-12-03 | End: 2024-12-10 | Stop reason: HOSPADM

## 2024-12-03 RX ORDER — ONDANSETRON 8 MG/1
8 TABLET, ORALLY DISINTEGRATING ORAL EVERY 8 HOURS PRN
Status: DISCONTINUED | OUTPATIENT
Start: 2024-12-03 | End: 2024-12-10 | Stop reason: HOSPADM

## 2024-12-03 RX ORDER — HYDROCODONE BITARTRATE AND ACETAMINOPHEN 10; 325 MG/1; MG/1
1 TABLET ORAL EVERY 6 HOURS PRN
Status: DISCONTINUED | OUTPATIENT
Start: 2024-12-03 | End: 2024-12-07

## 2024-12-03 RX ORDER — SODIUM CHLORIDE 0.9 % (FLUSH) 0.9 %
10 SYRINGE (ML) INJECTION EVERY 8 HOURS
Status: DISCONTINUED | OUTPATIENT
Start: 2024-12-03 | End: 2024-12-10 | Stop reason: HOSPADM

## 2024-12-03 RX ORDER — HEPARIN SODIUM 5000 [USP'U]/ML
5000 INJECTION, SOLUTION INTRAVENOUS; SUBCUTANEOUS EVERY 8 HOURS
Status: DISCONTINUED | OUTPATIENT
Start: 2024-12-03 | End: 2024-12-10 | Stop reason: HOSPADM

## 2024-12-03 RX ORDER — AMOXICILLIN 250 MG
1 CAPSULE ORAL 2 TIMES DAILY PRN
Status: DISCONTINUED | OUTPATIENT
Start: 2024-12-03 | End: 2024-12-10 | Stop reason: HOSPADM

## 2024-12-03 RX ORDER — PERMETHRIN 50 MG/G
CREAM TOPICAL DAILY PRN
COMMUNITY
Start: 2024-10-16

## 2024-12-03 RX ORDER — CEFTRIAXONE 1 G/1
1 INJECTION, POWDER, FOR SOLUTION INTRAMUSCULAR; INTRAVENOUS
Status: DISCONTINUED | OUTPATIENT
Start: 2024-12-03 | End: 2024-12-04

## 2024-12-03 RX ORDER — FENTANYL CITRATE 50 UG/ML
50 INJECTION, SOLUTION INTRAMUSCULAR; INTRAVENOUS
Status: COMPLETED | OUTPATIENT
Start: 2024-12-03 | End: 2024-12-03

## 2024-12-03 RX ORDER — ONDANSETRON HYDROCHLORIDE 2 MG/ML
4 INJECTION, SOLUTION INTRAVENOUS ONCE
Status: COMPLETED | OUTPATIENT
Start: 2024-12-03 | End: 2024-12-03

## 2024-12-03 RX ADMIN — FENTANYL CITRATE 50 MCG: 50 INJECTION INTRAMUSCULAR; INTRAVENOUS at 12:12

## 2024-12-03 RX ADMIN — PROMETHAZINE HYDROCHLORIDE 6.25 MG: 25 INJECTION INTRAMUSCULAR; INTRAVENOUS at 04:12

## 2024-12-03 RX ADMIN — Medication 10 ML: at 07:12

## 2024-12-03 RX ADMIN — Medication 10 ML: at 10:12

## 2024-12-03 RX ADMIN — ONDANSETRON 4 MG: 2 INJECTION INTRAMUSCULAR; INTRAVENOUS at 12:12

## 2024-12-03 RX ADMIN — HEPARIN SODIUM 5000 UNITS: 5000 INJECTION INTRAVENOUS; SUBCUTANEOUS at 10:12

## 2024-12-03 RX ADMIN — CEFTRIAXONE 1 G: 1 INJECTION, POWDER, FOR SOLUTION INTRAMUSCULAR; INTRAVENOUS at 07:12

## 2024-12-03 RX ADMIN — HYDROCODONE BITARTRATE AND ACETAMINOPHEN 1 TABLET: 10; 325 TABLET ORAL at 03:12

## 2024-12-03 RX ADMIN — SODIUM CHLORIDE: 9 INJECTION, SOLUTION INTRAVENOUS at 07:12

## 2024-12-03 RX ADMIN — MORPHINE SULFATE 2 MG: 4 INJECTION INTRAVENOUS at 07:12

## 2024-12-03 NOTE — ED PROVIDER NOTES
"Emergency Medicine Provider Note - 12/3/2024       SCRIBE NOTE: IAmie, am scribing for, and in the presence of, Isabelle Hobson DO.     History     Chief Complaint   Patient presents with    Back Pain     Pt. Presents to ED via [facility] due to having back pain and decreased appetite for the past week. Per EMS family states pt was seen in this ED over the weekend and found her Lung CA spread to her liver not actively on Chemo. Pt was given 15mg of toradol and 4mg of zofran en route to ED        Allergies:  Review of patient's allergies indicates:   Allergen Reactions    Codeine Nausea Only, Anaphylaxis and Other (See Comments)    Adhesive Itching, Blisters and Other (See Comments)     tape  tape    Latex, natural rubber Itching     Other reaction(s): Not available, Other (See Comments)        History of Present Illness   HPI    12/3/2024, 11:48 AM  The history is provided by the patient, Daughter (Yfn), [facility] and old chart.     Katrina Duran is a 84 y.o. female presenting to the ED for lower back pain.   Past Medical History: COPD,  AAA (Repaired), Hyperlipidemia, essential hypertension, diastolic heart failure with preserved ejection fraction,  CAD (CABG 1999, stent x 1), Lung Cancer (Left lung, adenocarcinoma s/p radiation 11/2023, followed by Mayuri Clarke out of [place]).  Pt reports she is unable to stand and ambulate secondary to the pain and generalized weakness. She notes she feels "like her legs are giving out" upon standing.  The pain is "excruciating  when getting out of bed" despite taking the pain medication.   (+) Nausea. Pt reports having a subjective fever but no measured oral temperature. Patient denies urinary retention (last urinated at 12:30 AM), denies perirectal paresthesia and denies numbness to the legs.  Patient denies chest pain, chest pressure, cough, shortness of breath, orthopnea, dysuria, urgency, frequency, abdominal pain.    According to pt's daughter, she has not " eaten in the last few days.  Patient had emergency department visit on December 1st with similar complaints.  Diagnosed with nondisplaced fracture of L3 as well as detrimental change to lingula and development of suspected hepatic masses  despite the pain medication, patient's pain is not controlled home.     Reviewed Prior Imaging:   Reading Physician Reading Date Result Priority   Rich Torres MD  [phone] 12/1/2024 STAT     Narrative & Impression  EXAMINATION:  CTA CHEST ABDOMEN PELVIS, multiplanar reconstructions     CLINICAL HISTORY:  Aortic aneurysm, known or suspected;     TECHNIQUE:  Axial images through the chest, abdomen and pelvis were obtained with the use of IV contrast.  Sagittal, coronal and 3D MIP reconstructions are provided for review and permanently archived.  Oral contrast was not utilized.     COMPARISON:  September 21, 2023 abdomen CTA, September 19 2023 PET-CT scan     FINDINGS:  VASCULATURE:     The thoracic aorta is intact without evidence for aneurysm or dissection.  Moderate atherosclerotic calcifications noted.  Postoperative changes are seen along the anterior portions of the ascending aorta consistent with prior CABG.  Extensive coronary artery calcifications.  Mitral valve plane calcifications again seen.     The pulmonary vasculature is normal without evidence for pulmonary emboli.  The cardiac chambers are enlarged.     The aortic stent graft is patent.  Slightly worsening mural thrombus along the left side of the proximal stent graft noted.  The thickness of the aneurysm surrounding the stent graft measures up to 1.1 cm, not significantly changed from the comparison study.  Where 2 pieces of the stent graft meet, there appears to be some contrast between the margins (reference image 545 of series 3).  This is unchanged from the comparison study as well.  Negative for extravasation of contrast.     Again seen are changes of an AV fistula in the left groin with asymmetric  contrast filling of the left femoral and iliac venous system as compared to the right.     CHEST: Interval progression of the amount of collapsed middle lobe/lingula.  The ground-glass/semi-solid opacity within the right lower lobe is increased in size in the interim, currently measuring 4.2 cm, with increased central solid component.  There is been interval development of a 7 mm spiculated nodule within the right lung apex as measured on axial image 147.  Stable middle lobe ground-glass opacity.  Stable inferior medial middle lobe scarring.  Stable elevation of the left hemidiaphragm.  Lungs are otherwise free of new pulmonary opacities.  Negative for pleural or pericardial effusions.     Negative for enlarged hilar, mediastinal, axillary or supraclavicular lymph nodes.     The airways are patent.  Densely rim calcified left thyroid nodule measuring 2.5 cm again seen.  The thyroid gland is otherwise normal.  The esophagus is normal.  Small to moderate hiatal hernia again seen.     ABDOMEN: There is been interval development of multiple inferior right hepatic lobe masses measuring up to 3.9 cm on axial image 527.  Atrophy of the left kidney again seen.  Stable 1.4 cm hyperdense nodule in the mid posterior right kidney.  Splenomegaly again seen.  The solid organs are otherwise unchanged in appearance.  The gallbladder and biliary tree are normal.     Stable prominent bishop hepatis lymph nodes.  Negative for ascites or inflammatory changes.     Increased stool burden.  Colonic interposition.  The patient has a mobile cecum.     I do not see a normal or an abnormal appendix.  Negative for free air.     PELVIS:  The urinary bladder is mildly distended.  The uterus is absent.  The rest of the female pelvic organs are normal.     Please see the lumbar spine CT scan performed the same day for details of the lower thoracic and lumbar region.  The lower cervical and upper thoracic spine is unchanged in appearance when  compared to July 18, 2023 chest CT scan.  Stable median sternotomy wires.  The sternum is intact.     Shoddy groin lymph nodes again seen.     Laxity of the linea alba with fat filled umbilical hernia again seen.     Impression:     1.  Detrimental change.  Interval increase in angular opacity within the lingula, likely a combination of partial collapse with possible underlying mass.  Interval increase in size of semi-solid right lower lobe nodule with increase central solid component, currently measuring 4.2 cm.  Interval development of a 7 mm spiculated nodule in the right lung apex.  Interval development of at least 2 hepatic masses measuring up to 3.9 cm.  These changes must be considered neoplastic in origin until proven otherwise.  A repeat PET CT scan would be helpful.     2.  Negative for acute process involving the rest of the visualized osseous structures, considering the changes described on the recent lumbar spine CT scan performed earlier the same day.  Please see that report for details.     3.  Negative for acute vascular abnormalities.  The appearance of the aortic stent graft and surrounding soft tissues is unchanged.  Redemonstration of AV fistula centered in the left groin.     4.  Negative for pulmonary embolism, aneurysm or dissection.  Negative for intraperitoneal free air, free fluid or hemoperitoneum.     5.  Mildly increased stool burden.     6.  Mildly distended urinary bladder.  Bladder outlet obstruction symptoms?     7.  Numerous stable findings as noted above.     All CT scans at this facility are performed  using dose modulation techniques as appropriate to performed exam including the following:  automated exposure control; adjustment of mA and/or kV according to the patients size (this includes techniques or standardized protocols for targeted exams where dose is matched to indication/reason for exam: i.e. extremities or head);  iterative reconstruction technique.         Electronically signed by:Rich Torres MD  Date:                                            12/01/2024  Time:                                           08:14        Exam Ended: 12/01/24 07:47 CST Last Resulted: 12/01/24 08:14 CST         Narrative & Impression  EXAMINATION:  CT LUMBAR SPINE WITHOUT CONTRAST     CLINICAL HISTORY:  Low back pain, increased fracture risk;     TECHNIQUE:  Axial noncontrast CT scan of the lumbar spine with sagittal and coronal reconstructions.     COMPARISON:  Lumbar spine x-ray from August 8, 2024, abdomen and pelvis CT scan from September 21, 2023     FINDINGS:  Diffuse disc height reduction most significantly involving L4/L5.  Multilevel vacuum disc phenomenon throughout the lower thoracic and lumbar region again seen.  Marked anterior marginal spondylosis, especially the upper lumbar spine.  Degenerative facet arthropathy without spondylolisthesis at this time.  Marked convex left curvature of the lumbar spine.  Schmorl node formation involves the superior endplate of L4, as well as multiple thoracic levels progressive osteopenia.     There are findings indicative of a nondisplaced fracture involving the posterior, inferior and left lateral corner of the L3 vertebral body (reference coronal image 90 and sagittal image 108)..  The lumbar vertebrae otherwise reveal normal alignment, shape and bone density. The lumbar vertebra are otherwise intact without evidence of fracture or dislocation.  There is multilevel moderate to severe nerve root foramen narrowing from posterior spondylosis mainly.  The most significantly involve level is L3/L4, where the thecal sac is narrowed to 7 mm.  Multilevel, multifactorial nerve root foramen narrowing noted as well.     Aortic stent graft remains in place.  There is some indistinction surrounding the stent graft, similar to the comparison CT scan marked atrophy of the left kidney again seen.  1.4 cm posterior midpole right renal hyperdense cyst,  unchanged.  The surrounding soft tissues are otherwise normal.     Impression:     1.  Findings concerning for a subtle nondisplaced fracture involving the inferior, posterior and left lateral corner of the L3 vertebral body.     2.  Negative for acute process otherwise involving the lumbar spine.  Marked degenerative disc changes and degenerative facet arthropathy noted diffusely with marked convex left curvature of the lumbar spine.  Multilevel, multifactorial moderate to severe spinal canal stenosis and nerve root foramen narrowing, most significantly involving the L3/L4 disc level.     3.  Aortic stent graft in place, with similar amount of mild indistinction surrounding the stent graft when compared to the prior study, of doubtful clinical significance.     4.  Other stable findings as noted above.  This includes a stable 1.4 cm hyperdense cyst in the right kidney, which is been stable for over 1 year.     5.  A call report was made to Dr. Barbosa at 07:25 hours on December 1, 2024.     All CT scans at this facility are performed  using dose modulation techniques as appropriate to performed exam including the following:  automated exposure control; adjustment of mA and/or kV according to the patients size (this includes techniques or standardized protocols for targeted exams where dose is matched to indication/reason for exam: i.e. extremities or head);  iterative reconstruction technique.        Electronically signed by:Rich Torres MD  Date:                                            12/01/2024  Time:                                           07:26        Exam Ended: 12/01/24 03:31 CST Last Resulted: 12/01/24 07:26 CST     Reviewed [facility] run sheet:  UPON [facility] CREWS ARRIVAL TO SCENE, PATIENT WAS FOUND LAYING IN HER BED. PATIENT WAS AWAKE AND ALERTED  TO CREWS ARRIVAL. NO IMMEDIATE SIGNS OF DISTRESS WERE OBSERVED. PATIENT WAS THEN HOOKED UP TO BLOOD  PRESSURE CUFF, PULSE OXIMETER AND EKG FOR ASSESSMENT OF  VITALS. VITALS WAS NOT IN NORMAL RANGE, PER  PROTOCOL THE PARAMEDIC ON TRUCK DID THE CORE ASSESSMENT AND DEEMED THIS A STATUS THREE PATIENT.  COMPLAINT: WEAKNESS  HISTORY OF PRESENT ILLNESS: PATIENT HAS FOUND OUT HER CANCER SPREAD. REALLY WEAK. NOT FEELING WELL.  NAUSEA. UNCOMFORTABLE.  ASSESSMENT AND IMPRESSION: GENERAL IMPRESSION OF PATIENT WAS WEAKNESS. PATIENT PRESENTED WITH WEAK.  PATIENT HAD GCS OF 15 . - PATIENT DENIED OR DID NOT PRESENT WITH : FEVER, HEADACHE, DIZZINESS, AND VISUAL  CHANGES. -SKIN WAS NORMAL, NO DISCOLORATION OF SKIN WAS REPORTED BY PATIENT OR OBSERVED. -CAPILLARY  REFILL WAS NORMAL. NO CIRCULATION COMPLICATIONS WAS OBSERVED OR REPORTED BY PATIENT. -NO JVD OR  TRACHEA DEVIATION WAS NOTED. -BREATH SOUNDS WERE CLEAR BILATERALLY, BY AUSCULTATION OF LUNGS. PATIENT  HAD EQUAL CHEST RISE AND FALL. -PATIENT DENIED CHEST PAIN OR DISCOMFORT. - NO SOB OR DIFFICULTY BREATHING  WAS REPORTED BY PATIENT OR OBSERVED. -NO COUGH, CONGESTION, FLU OR COVID TYPE SYMPTOMS WAS REPORTED  BY PATIENT OR OBSERVED. -ABDOMEN WAS SOFT, NON-TENDER UPON PALPATION ALSO WAS NON-DISTENDED UPON  OBSERVATION. -PELVIS WAS NORMAL, STABLE, NO PAIN OR DISCOMFORT WAS REPORTED BY PATIENT OR OBSERVED  UPON EVALUATION. -PATIENT DENIED NAUSEA, VOMITING, OR DIARRHEA RECENTLY. -DISTAL PULSES WERE PRESENT  AND NEUROS WERE INTACT TO ALL EXTREMITIES WITH GOOD RANGE OF MOTION. NO PEDAL EDEMA WAS OBSERVED. -  PATIENT DENIES ANY FALLS RECENTLY, AND NO EVIDENCE WAS OBSERVED TO CONTRADICT PATIENTS STATEMENT OF  NO FALLS. -LAST KNOWN ORAL INTAKE WAS (UNKNOWN.  RX (TREATMENT): PATIENT WAS TREATED/MONITORED AS LISTED ABOVE.  TRANSPORT: PATIENT SELF-AMBULATED WITH ASSISTANCE SAT ON STRETCHER, LIFTED LEGS ONTO THE STRETCHER,  A DARK BLUE PRIVACY SHEET WAS PLACED ON PATIENT BY CREW THEN WAS SECURED WITH THE TWO SHOULDER  HARNESSES, LAP BELTS. PATIENT WAS THEN MOVED INTO THE UNIT BY BOTH CREW MEMBERS. TRANSPORTED TO  [facility] PER PATIENT OR PATIENTS  FAMILY REQUEST. PATIENT WAS MONITORED THROUGHOUT TRANSPORT TO  RECEIVING FACILITY, VITALS REMAINED WITHIN NORMAL RANGE. PATIENT REMAINED A STABLE, STATUS THREE PATIENT.  UPON ARRIVAL TO FACILITY, PATIENT WAS UNLOADED BY BOTH CREW MEMBERS, MOVED INSIDE VIA STRETCHER.    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    COPD (chronic obstructive pulmonary disease)     Coronary artery disease     Essential (primary) hypertension 12/15/2005    Heart attack         Mass of lower lobe of right lung 2016    Simple chronic bronchitis 2016       Past Surgical History:  Past Surgical History:   Procedure Laterality Date    APPENDECTOMY      BACK SURGERY      CORONARY ANGIOPLASTY WITH STENT PLACEMENT      HYSTERECTOMY      OTHER SURGICAL HISTORY      Abdominal Aneurysm    TONSILLECTOMY           Family History:  Family History   Problem Relation Name Age of Onset    Cancer Sister         Social History:  Social History     Tobacco Use    Smoking status: Former     Current packs/day: 0.00     Types: Cigarettes     Quit date: 1999     Years since quittin.9    Smokeless tobacco: Never   Substance and Sexual Activity    Alcohol use: No    Drug use: No    Sexual activity: Never        Review of Systems   Review of Systems   Constitutional:  Positive for fatigue and fever (subjective).   Respiratory:  Negative for cough and shortness of breath.    Cardiovascular:  Negative for chest pain.   Gastrointestinal:  Positive for nausea. Negative for abdominal pain, constipation and vomiting.   Genitourinary:  Negative for dysuria.        (-) perirectal paresthesia, (-) urinary retention.   Musculoskeletal:  Negative for back pain.        (+)  lower back pain   Skin:  Negative for rash.   Neurological:  Negative for weakness and numbness.   All other systems reviewed and are negative.         Physical Exam     Initial Vitals [12/03/24 1051]   BP Pulse Resp Temp SpO2   (!) 186/79 91 18 98.5 °F (36.9 °C) 96 %      MAP       --          Physical Exam    Nursing Notes and Vital Signs Reviewed.  Constitutional: Patient is in no acute distress.   Head: Atraumatic. Normocephalic.  Eyes: PERRL. EOM intact. Conjunctivae are not pale. No scleral icterus.  ENT: Mucous membranes are moist. Oropharynx is clear and symmetric.    Neck: Supple. Full ROM. No lymphadenopathy.  Cardiovascular: Regular rate. Regular rhythm. No murmurs, rubs, or gallops. Distal pulses are 2+ and symmetric.  Pulmonary/Chest: No respiratory distress. Clear to auscultation bilaterally. No wheezing or rales.  Abdominal: Soft and non-distended.  There is no tenderness.  No rebound, guarding, or rigidity. Good bowel sounds.  Rectal:  Chaperone present.  Verbal permission given.  Sensation intact.  Good rectal tone.  Stool brown color.  Musculoskeletal: Moves all extremities. No obvious deformities. No edema. No calf tenderness. No midline T spine tenderness. Plantar and dorsiflexion 5/5 and equal bilaterally.  Positive tenderness palpation of the lumbar 3rd vertebra.    Skin: Warm and dry.  Neurological:  Alert, awake, and appropriate.  Normal speech.  No acute focal neurological deficits are appreciated. Proprioception intact.   Psychiatric: Normal affect. Good eye contact. Appropriate in content.     ED Course   ED Procedures:  Procedures    ED Vital Signs:  Vitals:    12/03/24 1108 12/03/24 1130 12/03/24 1222 12/03/24 1224   BP: (!) 116/57 (!) 105/54     Pulse: 85 82     Resp: 14 18  (!) 22   Temp:       TempSrc:       SpO2: (!) 90% (!) 91% (!) 92%    Weight:        12/03/24 1247 12/03/24 1547 12/03/24 1559 12/03/24 1604   BP: (!) 150/84 118/65     Pulse: 89 90     Resp: 19 (!) 21 19    Temp:     97.8 °F (36.6 °C)   TempSrc:    Axillary   SpO2:  (!) 92%     Weight:        12/03/24 1736 12/03/24 1741 12/03/24 1743 12/03/24 1839   BP: (!) 105/57      Pulse: 89 94 94    Resp: 17      Temp: 99.2 °F (37.3 °C)      TempSrc: Oral      SpO2: (!) 90%      Weight:    77 kg (169 lb 12.1 oz)    12/03/24 1932 12/03/24 2005 12/03/24 2041   BP:  (!) 104/57    Pulse:  82 81   Resp: 16 20    Temp:  98.3 °F (36.8 °C)    TempSrc:  Oral    SpO2:  (!) 90%    Weight:          Abnormal Lab Results:  Labs Reviewed   CBC W/ AUTO DIFFERENTIAL - Abnormal       Result Value    WBC 12.39      RBC 4.18      Hemoglobin 11.1 (*)     Hematocrit 34.8 (*)     MCV 83      MCH 26.6 (*)     MCHC 31.9 (*)     RDW 15.2 (*)     Platelets 130 (*)     MPV 10.7      Immature Granulocytes 0.6 (*)     Gran # (ANC) 11.4 (*)     Immature Grans (Abs) 0.08 (*)     Lymph # 0.2 (*)     Mono # 0.5      Eos # 0.3      Baso # 0.02      nRBC 0      Gran % 91.9 (*)     Lymph % 1.4 (*)     Mono % 3.9 (*)     Eosinophil % 2.0      Basophil % 0.2      Platelet Estimate Decreased (*)     Differential Method Automated     COMPREHENSIVE METABOLIC PANEL - Abnormal    Sodium 134 (*)     Potassium 4.4      Chloride 104      CO2 17 (*)     Glucose 142 (*)     BUN 43 (*)     Creatinine 1.5 (*)     Calcium 9.1      Total Protein 6.5      Albumin 3.0 (*)     Total Bilirubin 0.9      Alkaline Phosphatase 118      AST 23      ALT 11      eGFR 34 (*)     Anion Gap 13     B-TYPE NATRIURETIC PEPTIDE - Abnormal    BNP 1,873 (*)    PROTIME-INR    Prothrombin Time 11.9      INR 1.1     APTT    aPTT 22.6          All Lab Results:  Results for orders placed or performed during the hospital encounter of 12/03/24   CBC Auto Differential    Collection Time: 12/03/24 12:25 PM   Result Value Ref Range    WBC 12.39 3.90 - 12.70 K/uL    RBC 4.18 4.00 - 5.40 M/uL    Hemoglobin 11.1 (L) 12.0 - 16.0 g/dL    Hematocrit 34.8 (L) 37.0 - 48.5 %    MCV 83 82 - 98 fL    MCH 26.6 (L) 27.0 - 31.0 pg     MCHC 31.9 (L) 32.0 - 36.0 g/dL    RDW 15.2 (H) 11.5 - 14.5 %    Platelets 130 (L) 150 - 450 K/uL    MPV 10.7 9.2 - 12.9 fL    Immature Granulocytes 0.6 (H) 0.0 - 0.5 %    Gran # (ANC) 11.4 (H) 1.8 - 7.7 K/uL    Immature Grans (Abs) 0.08 (H) 0.00 - 0.04 K/uL    Lymph # 0.2 (L) 1.0 - 4.8 K/uL    Mono # 0.5 0.3 - 1.0 K/uL    Eos # 0.3 0.0 - 0.5 K/uL    Baso # 0.02 0.00 - 0.20 K/uL    nRBC 0 0 /100 WBC    Gran % 91.9 (H) 38.0 - 73.0 %    Lymph % 1.4 (L) 18.0 - 48.0 %    Mono % 3.9 (L) 4.0 - 15.0 %    Eosinophil % 2.0 0.0 - 8.0 %    Basophil % 0.2 0.0 - 1.9 %    Platelet Estimate Decreased (A)     Differential Method Automated    Comprehensive Metabolic Panel    Collection Time: 12/03/24 12:25 PM   Result Value Ref Range    Sodium 134 (L) 136 - 145 mmol/L    Potassium 4.4 3.5 - 5.1 mmol/L    Chloride 104 95 - 110 mmol/L    CO2 17 (L) 23 - 29 mmol/L    Glucose 142 (H) 70 - 110 mg/dL    BUN 43 (H) 8 - 23 mg/dL    Creatinine 1.5 (H) 0.5 - 1.4 mg/dL    Calcium 9.1 8.7 - 10.5 mg/dL    Total Protein 6.5 6.0 - 8.4 g/dL    Albumin 3.0 (L) 3.5 - 5.2 g/dL    Total Bilirubin 0.9 0.1 - 1.0 mg/dL    Alkaline Phosphatase 118 40 - 150 U/L    AST 23 10 - 40 U/L    ALT 11 10 - 44 U/L    eGFR 34 (A) >60 mL/min/1.73 m^2    Anion Gap 13 8 - 16 mmol/L   BNP    Collection Time: 12/03/24 12:25 PM   Result Value Ref Range    BNP 1,873 (H) 0 - 99 pg/mL   Protime-INR    Collection Time: 12/03/24 12:25 PM   Result Value Ref Range    Prothrombin Time 11.9 9.0 - 12.5 sec    INR 1.1 0.8 - 1.2   APTT    Collection Time: 12/03/24 12:25 PM   Result Value Ref Range    aPTT 22.6 21.0 - 32.0 sec   Lactic Acid, Plasma    Collection Time: 12/03/24  6:02 PM   Result Value Ref Range    Lactate (Lactic Acid) 1.5 0.5 - 2.2 mmol/L   Lactic Acid, Plasma    Collection Time: 12/03/24  9:32 PM   Result Value Ref Range    Lactate (Lactic Acid) 1.8 0.5 - 2.2 mmol/L         Imaging Results:  Imaging Results              X-Ray Chest AP Portable (Final result)  Result  time 12/03/24 13:08:01      Final result by Cam Obrien MD (12/03/24 13:08:01)                   Impression:      Worsening left-sided airspace opacity in comparison to the prior radiograph.  Underlying mass not excluded.      Electronically signed by: Cam Obrien  Date:    12/03/2024  Time:    13:08               Narrative:    EXAMINATION:  XR CHEST AP PORTABLE    CLINICAL HISTORY:  Low back pain, unspecified    TECHNIQUE:  Single frontal view of the chest was performed.    COMPARISON:  Multiple    FINDINGS:  Suspect worsening left-sided pulmonary opacity in comparison to the prior radiograph.  Underlying mass not excluded.  No pleural fluid or pneumothorax.    The cardiac silhouette is enlarged.  The hilar and mediastinal contours are unremarkable.    Bones are intact.                                            The Emergency Provider reviewed the vital signs and test results, which are outlined above.     ED Discussion   ED Medication(s):  Medications   HYDROcodone-acetaminophen  mg per tablet 1 tablet (1 tablet Oral Given 12/3/24 1559)   morphine injection 2 mg (2 mg Intravenous Given 12/3/24 1932)   sodium chloride 0.9% flush 10 mL (10 mLs Intravenous Given 12/3/24 2200)   melatonin tablet 6 mg (has no administration in time range)   polyethylene glycol packet 17 g (has no administration in time range)   senna-docusate 8.6-50 mg per tablet 1 tablet (has no administration in time range)   acetaminophen tablet 650 mg (has no administration in time range)   naloxone 0.4 mg/mL injection 0.02 mg (has no administration in time range)   heparin (porcine) injection 5,000 Units (5,000 Units Subcutaneous Given 12/3/24 2221)   ondansetron disintegrating tablet 8 mg (has no administration in time range)   promethazine tablet 25 mg (has no administration in time range)   0.9% NaCl infusion ( Intravenous New Bag 12/3/24 1954)   cefTRIAXone injection 1 g (1 g Intravenous Given 12/3/24 1954)   fentaNYL 50 mcg/mL  injection 50 mcg (50 mcg Intravenous Given 12/3/24 1224)   ondansetron injection 4 mg (4 mg Intravenous Given 12/3/24 1225)   promethazine (PHENERGAN) 6.25 mg in 0.9% NaCl 50 mL IVPB (0 mg Intravenous Stopped 12/3/24 1620)          12:13 PM: Spoke with Spencer White MD (Neurosurgery). Small non displaced vertebral body fracture.  (+) Stable.  Non operative intervention.    1:44 PM: Secure chat with   JAYLENE Hernandez . Reinier Salinas MD agrees with current care and management of pt and accepts admission.   Admitting/Observing Service: Hospital Medicine Service   Admitting Physician: Reinier Salinas MD  Floor:Med/Tele         MIPS Measures     Smoker? No     Hypertension: History of Hypertension: The patient has elevated blood pressure (higher than 120/80) while being treated in the ED but has a history of hypertension.       Medical Decision Making                 Medical Decision Making  Differential diagnosis:  Acute cord syndrome, urinary tract infection, shingles, electrolyte abnormality    ED course: IV established.  50 mcg of fentanyl administered.  WBC 12.39.  H/H 11.1/34.8.  Platelet count 130.  BUN 43.  Creatinine 1.3.  Liver enzymes normal.  BNP 1873.  PT and INR normal.  Chest x-ray shows worsening left sided airspace opacity.  Underlying mass not excluded.  On clinical exam, patient has good rectal tone, sensation intact and strength intact in lower extremities.  Secure chat with Neurosurgery:  Nonoperative management.  This is patient's 2nd ED visit since December 1, 2024.  Patient's pain is not controlled with oral Percocet.  Unfortunately, patient does have COPD with room air saturations between 89-90%.  This does limit safe administration of narcotic pain medication at home secondary to concerns developing hypercapnia.  Additionally, patient has worsening lingular mass.  She was followed by Mayuri Clarke out of [place].  Family is interested in establishing hematology/oncology with [facility] as this is closer to home.  As such, recommendation was placed for observation hospital.  Patient will likely require  support regarding ultimate disposition of the patient.    Amount and/or Complexity of Data Reviewed  Independent Historian: caregiver and EMS     Details: See HPI.  External Data Reviewed: radiology.     Details: See HPI.  Labs: ordered. Decision-making details documented in ED Course.  Radiology: ordered and independent interpretation performed. Decision-making details documented in ED Course.     Details: Left lingular mass present.    Risk  Prescription drug management.  Parenteral controlled substances.  Decision regarding hospitalization.        Coding    Prescription Management: I performed a review of the patient's current Rx medication list as input by nursing staff.    Current Discharge Medication List        CONTINUE these medications which have NOT CHANGED    Details   albuterol-ipratropium (DUO-NEB) 2.5 mg-0.5 mg/3 mL nebulizer solution Take 3 mLs by nebulization every 6 (six) hours as needed for Wheezing. Rescue  Qty: 25 each, Refills: 5    Associated Diagnoses: Adenocarcinoma of left lung; Chronic obstructive pulmonary disease, unspecified COPD type      fluticasone-umeclidin-vilanter (TRELEGY ELLIPTA) 100-62.5-25 mcg DsDv Inhale 1 puff into the lungs every morning.  Qty: 60 each, Refills: 5    Associated Diagnoses: Adenocarcinoma of left lung; Chronic obstructive pulmonary disease, unspecified COPD type      gabapentin (NEURONTIN) 100 MG capsule Take 1 capsule (100 mg total) by mouth 3 (three) times daily.  Qty: 90 capsule, Refills: 2    Associated Diagnoses: Multiple excoriations      losartan (COZAAR) 100 MG tablet TAKE 1 TABLET(100 MG) BY MOUTH EVERY DAY  Qty: 90 tablet, Refills: 3    Comments: .  Associated Diagnoses: Essential (primary) hypertension      oxyCODONE-acetaminophen (PERCOCET)  mg per tablet Take 1 tablet by mouth every 4 (four) hours  "as needed for Pain.  Qty: 28 tablet, Refills: 0    Comments: Quantity prescribed more than 7 day supply? Yes, quantity medically necessary  Associated Diagnoses: Acute bilateral low back pain without sciatica      sertraline (ZOLOFT) 100 MG tablet TAKE 1 TABLET BY MOUTH EVERY DAY  Qty: 90 tablet, Refills: 2      acetaminophen (TYLENOL) 500 MG tablet Take 500 mg by mouth every 8 (eight) hours as needed for Pain. Indications: muscle pain      clobetasol 0.05% (TEMOVATE) 0.05 % Oint AAA on the scalp BID x 4-6 weeks then D/C. Restart PRN for flares.  Qty: 60 g, Refills: 3    Associated Diagnoses: Multiple excoriations      fluocinolone (DERMA-SMOOTHE) 0.01 % external oil Part the hair at night, apply to flared areas on the scalp QHS PRN for itch.  Qty: 118.28 mL, Refills: 2    Associated Diagnoses: Irritant contact dermatitis due to cosmetics      permethrin (ELIMITE) 5 % cream Apply topically daily as needed.      pimecrolimus (ELIDEL) 1 % cream Apply topically 2 (two) times daily.  Qty: 60 g, Refills: 2    Associated Diagnoses: Atopic dermatitis, unspecified type      simvastatin (ZOCOR) 80 MG tablet TAKE 1 TABLET(80 MG) BY MOUTH EVERY EVENING  Qty: 90 tablet, Refills: 2              Discussed case with:N/A        Portions of this note may have been created with voice recognition software. Occasional "wrong-word" or "sound-a-like" substitutions may have occurred due to the inherent limitations of voice recognition software. Please, read the note carefully and recognize, using context, where substitutions have occurred.          Clinical Impression       ICD-10-CM ICD-9-CM   1. Intractable low back pain  M54.59 724.2   2. Low back pain  M54.50 724.2         3. Mass of left lung  R91.8 786.6           ED Disposition     Disposition: Admit to telemetry  Patient condition: Stable        Scribe Attestation:   Scribe #1: I performed the above scribed service and the documentation accurately describes the services I " performed. I attest to the accuracy of the note.     Attending:   Physician Attestation Statement for Scribe #1: I, Isabelle Hobson, DO, personally performed the services described in this documentation, as scribed by Amie Cuba, in my presence, and it is both accurate and complete.                Isabelle Hobson,   12/03/24 2338       Isabelle Hobson,   12/03/24 2338

## 2024-12-03 NOTE — PHARMACY MED REC
"Admission Medication History     The home medication history was taken by Varsha Bee.    You may go to "Admission" then "Reconcile Home Medications" tabs to review and/or act upon these items.     The home medication list has been updated by the Pharmacy department.   Please read ALL comments highlighted in yellow.   Please address this information as you see fit.    Feel free to contact us if you have any questions or require assistance.      The medications listed below were removed from the home medication list. Please reorder if appropriate:  Patient reports no longer taking the following medication(s):  Benzonatate 200 mg  Voltaren 1% gel  Lasix 20 mg  Vitamin D3 1000 units  Kenalog 0.1% ointment  Tramadol 50 mg    Medications listed below were obtained from: Patient/family and Analytic software- Shannon Noble (patient's daughter was called by grand-daughter at bedside)      LAST MED REC COMPLETED:       Varsha Bee  [id number]      Current Outpatient Medications on File Prior to Encounter   Medication Sig Dispense Refill Last Dose/Taking    albuterol-ipratropium (DUO-NEB) 2.5 mg-0.5 mg/3 mL nebulizer solution Take 3 mLs by nebulization every 6 (six) hours as needed for Wheezing. Rescue 25 each 5 12/2/2024    fluticasone-umeclidin-vilanter (TRELEGY ELLIPTA) 100-62.5-25 mcg DsDv Inhale 1 puff into the lungs every morning. 60 each 5 12/2/2024    gabapentin (NEURONTIN) 100 MG capsule Take 1 capsule (100 mg total) by mouth 3 (three) times daily. 90 capsule 2 12/2/2024    losartan (COZAAR) 100 MG tablet TAKE 1 TABLET(100 MG) BY MOUTH EVERY DAY (Patient taking differently: Take 100 mg by mouth once daily.) 90 tablet 3 12/2/2024    oxyCODONE-acetaminophen (PERCOCET)  mg per tablet Take 1 tablet by mouth every 4 (four) hours as needed for Pain. 28 tablet 0 12/3/2024 at 10:00 AM    sertraline (ZOLOFT) 100 MG tablet TAKE 1 TABLET BY MOUTH EVERY DAY (Patient taking differently: Take 100 mg by mouth " once daily.) 90 tablet 2 12/2/2024    acetaminophen (TYLENOL) 500 MG tablet Take 500 mg by mouth every 8 (eight) hours as needed for Pain. Indications: muscle pain   Unknown    clobetasol 0.05% (TEMOVATE) 0.05 % Oint AAA on the scalp BID x 4-6 weeks then D/C. Restart PRN for flares. 60 g 3 Unknown    fluocinolone (DERMA-SMOOTHE) 0.01 % external oil Part the hair at night, apply to flared areas on the scalp QHS PRN for itch. 118.28 mL 2 Unknown    permethrin (ELIMITE) 5 % cream Apply topically daily as needed.   Unknown    pimecrolimus (ELIDEL) 1 % cream Apply topically 2 (two) times daily. 60 g 2 Unknown    simvastatin (ZOCOR) 80 MG tablet TAKE 1 TABLET(80 MG) BY MOUTH EVERY EVENING (Patient taking differently: Take 80 mg by mouth nightly.) 90 tablet 2                            .

## 2024-12-04 PROBLEM — S32.039A CLOSED FRACTURE OF THIRD LUMBAR VERTEBRA: Status: ACTIVE | Noted: 2024-12-03

## 2024-12-04 LAB
ALBUMIN SERPL BCP-MCNC: 2.5 G/DL (ref 3.5–5.2)
ALBUMIN SERPL BCP-MCNC: 2.6 G/DL (ref 3.5–5.2)
ALLENS TEST: ABNORMAL
ALP SERPL-CCNC: 107 U/L (ref 40–150)
ALP SERPL-CCNC: 110 U/L (ref 40–150)
ALT SERPL W/O P-5'-P-CCNC: 14 U/L (ref 10–44)
ALT SERPL W/O P-5'-P-CCNC: 9 U/L (ref 10–44)
ANION GAP SERPL CALC-SCNC: 12 MMOL/L (ref 8–16)
ANION GAP SERPL CALC-SCNC: 9 MMOL/L (ref 8–16)
AST SERPL-CCNC: 16 U/L (ref 10–40)
AST SERPL-CCNC: 21 U/L (ref 10–40)
BACTERIA #/AREA URNS HPF: NORMAL /HPF
BASOPHILS # BLD AUTO: 0.03 K/UL (ref 0–0.2)
BASOPHILS # BLD AUTO: 0.03 K/UL (ref 0–0.2)
BASOPHILS NFR BLD: 0.3 % (ref 0–1.9)
BASOPHILS NFR BLD: 0.3 % (ref 0–1.9)
BILIRUB SERPL-MCNC: 0.6 MG/DL (ref 0.1–1)
BILIRUB SERPL-MCNC: 0.6 MG/DL (ref 0.1–1)
BILIRUB UR QL STRIP: NEGATIVE
BUN SERPL-MCNC: 47 MG/DL (ref 8–23)
BUN SERPL-MCNC: 47 MG/DL (ref 8–23)
CALCIUM SERPL-MCNC: 8.5 MG/DL (ref 8.7–10.5)
CALCIUM SERPL-MCNC: 8.7 MG/DL (ref 8.7–10.5)
CHLORIDE SERPL-SCNC: 103 MMOL/L (ref 95–110)
CHLORIDE SERPL-SCNC: 104 MMOL/L (ref 95–110)
CLARITY UR: CLEAR
CO2 SERPL-SCNC: 14 MMOL/L (ref 23–29)
CO2 SERPL-SCNC: 20 MMOL/L (ref 23–29)
COLOR UR: YELLOW
CREAT SERPL-MCNC: 1.3 MG/DL (ref 0.5–1.4)
CREAT SERPL-MCNC: 1.5 MG/DL (ref 0.5–1.4)
DELSYS: ABNORMAL
DIFFERENTIAL METHOD BLD: ABNORMAL
DIFFERENTIAL METHOD BLD: ABNORMAL
EOSINOPHIL # BLD AUTO: 0 K/UL (ref 0–0.5)
EOSINOPHIL # BLD AUTO: 0 K/UL (ref 0–0.5)
EOSINOPHIL NFR BLD: 0.3 % (ref 0–8)
EOSINOPHIL NFR BLD: 0.4 % (ref 0–8)
ERYTHROCYTE [DISTWIDTH] IN BLOOD BY AUTOMATED COUNT: 15.2 % (ref 11.5–14.5)
ERYTHROCYTE [DISTWIDTH] IN BLOOD BY AUTOMATED COUNT: 15.2 % (ref 11.5–14.5)
EST. GFR  (NO RACE VARIABLE): 34 ML/MIN/1.73 M^2
EST. GFR  (NO RACE VARIABLE): 40 ML/MIN/1.73 M^2
GLUCOSE SERPL-MCNC: 145 MG/DL (ref 70–110)
GLUCOSE SERPL-MCNC: 171 MG/DL (ref 70–110)
GLUCOSE SERPL-MCNC: 211 MG/DL (ref 70–110)
GLUCOSE UR QL STRIP: NEGATIVE
HCO3 UR-SCNC: 17.7 MMOL/L (ref 24–28)
HCT VFR BLD AUTO: 34.3 % (ref 37–48.5)
HCT VFR BLD AUTO: 35.2 % (ref 37–48.5)
HCT VFR BLD CALC: 32 %PCV (ref 36–54)
HGB BLD-MCNC: 10.7 G/DL (ref 12–16)
HGB BLD-MCNC: 10.7 G/DL (ref 12–16)
HGB UR QL STRIP: NEGATIVE
HYALINE CASTS #/AREA URNS LPF: 1 /LPF
IMM GRANULOCYTES # BLD AUTO: 0.07 K/UL (ref 0–0.04)
IMM GRANULOCYTES # BLD AUTO: 0.08 K/UL (ref 0–0.04)
IMM GRANULOCYTES NFR BLD AUTO: 0.6 % (ref 0–0.5)
IMM GRANULOCYTES NFR BLD AUTO: 0.7 % (ref 0–0.5)
KETONES UR QL STRIP: NEGATIVE
LACTATE SERPL-SCNC: 2.2 MMOL/L (ref 0.5–2.2)
LEUKOCYTE ESTERASE UR QL STRIP: NEGATIVE
LYMPHOCYTES # BLD AUTO: 0.2 K/UL (ref 1–4.8)
LYMPHOCYTES # BLD AUTO: 0.3 K/UL (ref 1–4.8)
LYMPHOCYTES NFR BLD: 1.9 % (ref 18–48)
LYMPHOCYTES NFR BLD: 2.6 % (ref 18–48)
MAGNESIUM SERPL-MCNC: 2.1 MG/DL (ref 1.6–2.6)
MAGNESIUM SERPL-MCNC: 2.2 MG/DL (ref 1.6–2.6)
MCH RBC QN AUTO: 26.5 PG (ref 27–31)
MCH RBC QN AUTO: 26.6 PG (ref 27–31)
MCHC RBC AUTO-ENTMCNC: 30.4 G/DL (ref 32–36)
MCHC RBC AUTO-ENTMCNC: 31.2 G/DL (ref 32–36)
MCV RBC AUTO: 85 FL (ref 82–98)
MCV RBC AUTO: 87 FL (ref 82–98)
MICROSCOPIC COMMENT: NORMAL
MONOCYTES # BLD AUTO: 0.5 K/UL (ref 0.3–1)
MONOCYTES # BLD AUTO: 0.7 K/UL (ref 0.3–1)
MONOCYTES NFR BLD: 4.1 % (ref 4–15)
MONOCYTES NFR BLD: 6.7 % (ref 4–15)
NEUTROPHILS # BLD AUTO: 10.3 K/UL (ref 1.8–7.7)
NEUTROPHILS # BLD AUTO: 9.6 K/UL (ref 1.8–7.7)
NEUTROPHILS NFR BLD: 89.4 % (ref 38–73)
NEUTROPHILS NFR BLD: 92.7 % (ref 38–73)
NITRITE UR QL STRIP: NEGATIVE
NRBC BLD-RTO: 0 /100 WBC
NRBC BLD-RTO: 0 /100 WBC
PCO2 BLDA: 41.5 MMHG (ref 35–45)
PH SMN: 7.24 [PH] (ref 7.35–7.45)
PH UR STRIP: 6 [PH] (ref 5–8)
PHOSPHATE SERPL-MCNC: 4.1 MG/DL (ref 2.7–4.5)
PLATELET # BLD AUTO: 139 K/UL (ref 150–450)
PLATELET # BLD AUTO: 143 K/UL (ref 150–450)
PMV BLD AUTO: 10.8 FL (ref 9.2–12.9)
PMV BLD AUTO: 10.9 FL (ref 9.2–12.9)
PO2 BLDA: 212 MMHG (ref 80–100)
POC BE: -10 MMOL/L
POC IONIZED CALCIUM: 1.27 MMOL/L (ref 1.06–1.42)
POC SATURATED O2: 100 % (ref 95–100)
POCT GLUCOSE: 182 MG/DL (ref 70–110)
POTASSIUM BLD-SCNC: 4.9 MMOL/L (ref 3.5–5.1)
POTASSIUM SERPL-SCNC: 4.4 MMOL/L (ref 3.5–5.1)
POTASSIUM SERPL-SCNC: 4.8 MMOL/L (ref 3.5–5.1)
PROCALCITONIN SERPL IA-MCNC: 0.72 NG/ML
PROT SERPL-MCNC: 5.8 G/DL (ref 6–8.4)
PROT SERPL-MCNC: 6 G/DL (ref 6–8.4)
PROT UR QL STRIP: ABNORMAL
RBC # BLD AUTO: 4.03 M/UL (ref 4–5.4)
RBC # BLD AUTO: 4.04 M/UL (ref 4–5.4)
RBC #/AREA URNS HPF: 0 /HPF (ref 0–4)
SAMPLE: ABNORMAL
SITE: ABNORMAL
SODIUM BLD-SCNC: 131 MMOL/L (ref 136–145)
SODIUM SERPL-SCNC: 130 MMOL/L (ref 136–145)
SODIUM SERPL-SCNC: 132 MMOL/L (ref 136–145)
SODIUM UR-SCNC: 53 MMOL/L (ref 20–250)
SP GR UR STRIP: >1.03 (ref 1–1.03)
URN SPEC COLLECT METH UR: ABNORMAL
UROBILINOGEN UR STRIP-ACNC: ABNORMAL EU/DL
WBC # BLD AUTO: 10.7 K/UL (ref 3.9–12.7)
WBC # BLD AUTO: 11.09 K/UL (ref 3.9–12.7)
WBC #/AREA URNS HPF: 2 /HPF (ref 0–5)

## 2024-12-04 PROCEDURE — 63600175 PHARM REV CODE 636 W HCPCS: Performed by: EMERGENCY MEDICINE

## 2024-12-04 PROCEDURE — 94640 AIRWAY INHALATION TREATMENT: CPT

## 2024-12-04 PROCEDURE — 87154 CUL TYP ID BLD PTHGN 6+ TRGT: CPT | Performed by: NURSE PRACTITIONER

## 2024-12-04 PROCEDURE — 36415 COLL VENOUS BLD VENIPUNCTURE: CPT | Performed by: EMERGENCY MEDICINE

## 2024-12-04 PROCEDURE — 85025 COMPLETE CBC W/AUTO DIFF WBC: CPT | Performed by: EMERGENCY MEDICINE

## 2024-12-04 PROCEDURE — 25000242 PHARM REV CODE 250 ALT 637 W/ HCPCS

## 2024-12-04 PROCEDURE — 87040 BLOOD CULTURE FOR BACTERIA: CPT | Mod: 59 | Performed by: NURSE PRACTITIONER

## 2024-12-04 PROCEDURE — 63600175 PHARM REV CODE 636 W HCPCS

## 2024-12-04 PROCEDURE — 87449 NOS EACH ORGANISM AG IA: CPT | Performed by: INTERNAL MEDICINE

## 2024-12-04 PROCEDURE — 83735 ASSAY OF MAGNESIUM: CPT | Performed by: EMERGENCY MEDICINE

## 2024-12-04 PROCEDURE — A4216 STERILE WATER/SALINE, 10 ML: HCPCS | Performed by: EMERGENCY MEDICINE

## 2024-12-04 PROCEDURE — 83605 ASSAY OF LACTIC ACID: CPT

## 2024-12-04 PROCEDURE — 99900035 HC TECH TIME PER 15 MIN (STAT)

## 2024-12-04 PROCEDURE — 25000242 PHARM REV CODE 250 ALT 637 W/ HCPCS: Performed by: INTERNAL MEDICINE

## 2024-12-04 PROCEDURE — 87186 SC STD MICRODIL/AGAR DIL: CPT | Performed by: NURSE PRACTITIONER

## 2024-12-04 PROCEDURE — 51798 US URINE CAPACITY MEASURE: CPT

## 2024-12-04 PROCEDURE — 93010 ELECTROCARDIOGRAM REPORT: CPT | Mod: ,,, | Performed by: INTERNAL MEDICINE

## 2024-12-04 PROCEDURE — 27100171 HC OXYGEN HIGH FLOW UP TO 24 HOURS

## 2024-12-04 PROCEDURE — 96372 THER/PROPH/DIAG INJ SC/IM: CPT | Performed by: EMERGENCY MEDICINE

## 2024-12-04 PROCEDURE — 25000003 PHARM REV CODE 250: Performed by: HOSPITALIST

## 2024-12-04 PROCEDURE — 81000 URINALYSIS NONAUTO W/SCOPE: CPT | Performed by: INTERNAL MEDICINE

## 2024-12-04 PROCEDURE — 97530 THERAPEUTIC ACTIVITIES: CPT

## 2024-12-04 PROCEDURE — 25000003 PHARM REV CODE 250: Performed by: EMERGENCY MEDICINE

## 2024-12-04 PROCEDURE — 84100 ASSAY OF PHOSPHORUS: CPT | Performed by: EMERGENCY MEDICINE

## 2024-12-04 PROCEDURE — 87077 CULTURE AEROBIC IDENTIFY: CPT | Performed by: NURSE PRACTITIONER

## 2024-12-04 PROCEDURE — 84132 ASSAY OF SERUM POTASSIUM: CPT

## 2024-12-04 PROCEDURE — 97166 OT EVAL MOD COMPLEX 45 MIN: CPT

## 2024-12-04 PROCEDURE — 85014 HEMATOCRIT: CPT

## 2024-12-04 PROCEDURE — 80053 COMPREHEN METABOLIC PANEL: CPT | Mod: 91

## 2024-12-04 PROCEDURE — G0426 INPT/ED TELECONSULT50: HCPCS | Mod: 95,,, | Performed by: INTERNAL MEDICINE

## 2024-12-04 PROCEDURE — 82330 ASSAY OF CALCIUM: CPT

## 2024-12-04 PROCEDURE — 84145 PROCALCITONIN (PCT): CPT | Performed by: NURSE PRACTITIONER

## 2024-12-04 PROCEDURE — 87081 CULTURE SCREEN ONLY: CPT

## 2024-12-04 PROCEDURE — 51701 INSERT BLADDER CATHETER: CPT

## 2024-12-04 PROCEDURE — 83735 ASSAY OF MAGNESIUM: CPT | Mod: 91

## 2024-12-04 PROCEDURE — 80053 COMPREHEN METABOLIC PANEL: CPT | Performed by: EMERGENCY MEDICINE

## 2024-12-04 PROCEDURE — 20000000 HC ICU ROOM

## 2024-12-04 PROCEDURE — 25000003 PHARM REV CODE 250: Performed by: INTERNAL MEDICINE

## 2024-12-04 PROCEDURE — 94761 N-INVAS EAR/PLS OXIMETRY MLT: CPT | Mod: XB

## 2024-12-04 PROCEDURE — 25000003 PHARM REV CODE 250

## 2024-12-04 PROCEDURE — 63600175 PHARM REV CODE 636 W HCPCS: Performed by: INTERNAL MEDICINE

## 2024-12-04 PROCEDURE — 97162 PT EVAL MOD COMPLEX 30 MIN: CPT

## 2024-12-04 PROCEDURE — 85025 COMPLETE CBC W/AUTO DIFF WBC: CPT | Mod: 91

## 2024-12-04 PROCEDURE — 63600175 PHARM REV CODE 636 W HCPCS: Performed by: HOSPITALIST

## 2024-12-04 PROCEDURE — 87798 DETECT AGENT NOS DNA AMP: CPT

## 2024-12-04 PROCEDURE — 82803 BLOOD GASES ANY COMBINATION: CPT

## 2024-12-04 PROCEDURE — 93005 ELECTROCARDIOGRAM TRACING: CPT

## 2024-12-04 PROCEDURE — 82570 ASSAY OF URINE CREATININE: CPT

## 2024-12-04 PROCEDURE — 84300 ASSAY OF URINE SODIUM: CPT

## 2024-12-04 PROCEDURE — 84295 ASSAY OF SERUM SODIUM: CPT

## 2024-12-04 PROCEDURE — 36600 WITHDRAWAL OF ARTERIAL BLOOD: CPT

## 2024-12-04 RX ORDER — IPRATROPIUM BROMIDE AND ALBUTEROL SULFATE 2.5; .5 MG/3ML; MG/3ML
3 SOLUTION RESPIRATORY (INHALATION)
Status: DISCONTINUED | OUTPATIENT
Start: 2024-12-05 | End: 2024-12-10 | Stop reason: HOSPADM

## 2024-12-04 RX ORDER — IPRATROPIUM BROMIDE AND ALBUTEROL SULFATE 2.5; .5 MG/3ML; MG/3ML
3 SOLUTION RESPIRATORY (INHALATION) ONCE
Status: COMPLETED | OUTPATIENT
Start: 2024-12-04 | End: 2024-12-04

## 2024-12-04 RX ORDER — FUROSEMIDE 10 MG/ML
20 INJECTION INTRAMUSCULAR; INTRAVENOUS ONCE
Status: DISCONTINUED | OUTPATIENT
Start: 2024-12-04 | End: 2024-12-04

## 2024-12-04 RX ORDER — CEFEPIME HYDROCHLORIDE 2 G/1
2 INJECTION, POWDER, FOR SOLUTION INTRAVENOUS
Status: DISCONTINUED | OUTPATIENT
Start: 2024-12-04 | End: 2024-12-05

## 2024-12-04 RX ORDER — HYDROMORPHONE HYDROCHLORIDE 2 MG/ML
1 INJECTION, SOLUTION INTRAMUSCULAR; INTRAVENOUS; SUBCUTANEOUS EVERY 6 HOURS PRN
Status: DISCONTINUED | OUTPATIENT
Start: 2024-12-04 | End: 2024-12-04

## 2024-12-04 RX ORDER — CYCLOBENZAPRINE HCL 10 MG
10 TABLET ORAL 3 TIMES DAILY PRN
Status: DISCONTINUED | OUTPATIENT
Start: 2024-12-04 | End: 2024-12-04

## 2024-12-04 RX ORDER — ARFORMOTEROL TARTRATE 15 UG/2ML
15 SOLUTION RESPIRATORY (INHALATION) 2 TIMES DAILY
Status: DISCONTINUED | OUTPATIENT
Start: 2024-12-04 | End: 2024-12-10 | Stop reason: HOSPADM

## 2024-12-04 RX ORDER — HYDROMORPHONE HYDROCHLORIDE 1 MG/ML
0.5 INJECTION, SOLUTION INTRAMUSCULAR; INTRAVENOUS; SUBCUTANEOUS EVERY 6 HOURS PRN
Status: DISCONTINUED | OUTPATIENT
Start: 2024-12-04 | End: 2024-12-07

## 2024-12-04 RX ORDER — LORAZEPAM 2 MG/ML
2 INJECTION INTRAMUSCULAR ONCE
Status: DISCONTINUED | OUTPATIENT
Start: 2024-12-04 | End: 2024-12-04

## 2024-12-04 RX ORDER — BUDESONIDE 0.5 MG/2ML
0.5 INHALANT ORAL EVERY 12 HOURS
Status: DISCONTINUED | OUTPATIENT
Start: 2024-12-04 | End: 2024-12-10 | Stop reason: HOSPADM

## 2024-12-04 RX ORDER — FUROSEMIDE 10 MG/ML
INJECTION INTRAMUSCULAR; INTRAVENOUS
Status: DISPENSED
Start: 2024-12-04 | End: 2024-12-05

## 2024-12-04 RX ORDER — CEFEPIME HYDROCHLORIDE 2 G/1
2 INJECTION, POWDER, FOR SOLUTION INTRAVENOUS
Status: DISCONTINUED | OUTPATIENT
Start: 2024-12-04 | End: 2024-12-04

## 2024-12-04 RX ORDER — FUROSEMIDE 10 MG/ML
20 INJECTION INTRAMUSCULAR; INTRAVENOUS ONCE
Status: COMPLETED | OUTPATIENT
Start: 2024-12-04 | End: 2024-12-04

## 2024-12-04 RX ORDER — MORPHINE SULFATE 2 MG/ML
1 INJECTION, SOLUTION INTRAMUSCULAR; INTRAVENOUS EVERY 4 HOURS PRN
Status: DISCONTINUED | OUTPATIENT
Start: 2024-12-04 | End: 2024-12-07

## 2024-12-04 RX ORDER — CLONAZEPAM 0.5 MG/1
0.5 TABLET ORAL 2 TIMES DAILY
Status: DISCONTINUED | OUTPATIENT
Start: 2024-12-04 | End: 2024-12-04

## 2024-12-04 RX ORDER — ACETAMINOPHEN 500 MG
1000 TABLET ORAL ONCE
Status: COMPLETED | OUTPATIENT
Start: 2024-12-04 | End: 2024-12-04

## 2024-12-04 RX ADMIN — HEPARIN SODIUM 5000 UNITS: 5000 INJECTION INTRAVENOUS; SUBCUTANEOUS at 05:12

## 2024-12-04 RX ADMIN — ACETAMINOPHEN 1000 MG: 500 TABLET ORAL at 08:12

## 2024-12-04 RX ADMIN — VANCOMYCIN HYDROCHLORIDE 1500 MG: 1.5 INJECTION, POWDER, LYOPHILIZED, FOR SOLUTION INTRAVENOUS at 10:12

## 2024-12-04 RX ADMIN — HEPARIN SODIUM 5000 UNITS: 5000 INJECTION INTRAVENOUS; SUBCUTANEOUS at 02:12

## 2024-12-04 RX ADMIN — SODIUM CHLORIDE: 9 INJECTION, SOLUTION INTRAVENOUS at 05:12

## 2024-12-04 RX ADMIN — HYDROCODONE BITARTRATE AND ACETAMINOPHEN 1 TABLET: 10; 325 TABLET ORAL at 02:12

## 2024-12-04 RX ADMIN — Medication 10 ML: at 05:12

## 2024-12-04 RX ADMIN — Medication 10 ML: at 10:12

## 2024-12-04 RX ADMIN — MORPHINE SULFATE 2 MG: 4 INJECTION INTRAVENOUS at 08:12

## 2024-12-04 RX ADMIN — CYCLOBENZAPRINE HYDROCHLORIDE 10 MG: 10 TABLET, FILM COATED ORAL at 04:12

## 2024-12-04 RX ADMIN — HYDROCODONE BITARTRATE AND ACETAMINOPHEN 1 TABLET: 10; 325 TABLET ORAL at 07:12

## 2024-12-04 RX ADMIN — BUDESONIDE INHALATION 0.5 MG: 0.5 SUSPENSION RESPIRATORY (INHALATION) at 09:12

## 2024-12-04 RX ADMIN — SODIUM CHLORIDE: 9 INJECTION, SOLUTION INTRAVENOUS at 02:12

## 2024-12-04 RX ADMIN — HEPARIN SODIUM 5000 UNITS: 5000 INJECTION INTRAVENOUS; SUBCUTANEOUS at 08:12

## 2024-12-04 RX ADMIN — FUROSEMIDE 20 MG: 10 INJECTION, SOLUTION INTRAMUSCULAR; INTRAVENOUS at 08:12

## 2024-12-04 RX ADMIN — Medication 10 ML: at 02:12

## 2024-12-04 RX ADMIN — IPRATROPIUM BROMIDE AND ALBUTEROL SULFATE 3 ML: 2.5; .5 SOLUTION RESPIRATORY (INHALATION) at 07:12

## 2024-12-04 RX ADMIN — METHYLPREDNISOLONE SODIUM SUCCINATE 60 MG: 40 INJECTION, POWDER, FOR SOLUTION INTRAMUSCULAR; INTRAVENOUS at 10:12

## 2024-12-04 RX ADMIN — HYDROMORPHONE HYDROCHLORIDE 1 MG: 2 INJECTION INTRAMUSCULAR; INTRAVENOUS; SUBCUTANEOUS at 11:12

## 2024-12-04 RX ADMIN — ONDANSETRON 8 MG: 8 TABLET, ORALLY DISINTEGRATING ORAL at 07:12

## 2024-12-04 RX ADMIN — ARFORMOTEROL TARTRATE 15 MCG: 15 SOLUTION RESPIRATORY (INHALATION) at 09:12

## 2024-12-04 RX ADMIN — CEFEPIME 2 G: 2 INJECTION, POWDER, FOR SOLUTION INTRAVENOUS at 10:12

## 2024-12-04 NOTE — ASSESSMENT & PLAN NOTE
As seen on UA 12/1- asymptomatic, no obvious dysuria sec to severe LBP  Will Treat with IVF and IV Rocephin  Urine Cx from 12/1- Neg

## 2024-12-04 NOTE — CONSULTS
[facility] - Telemetry ([facility])  Adult Nutrition  Consult Note    SUMMARY     Recommendations    Recommendation/Intervention:   1. Recommend pt continues on a Regular diet as medically approrpiate   2. Recommend pt continues Suplena TID to assist filling nutritional gaps   3. Encourage PO and supplement intake, recommend feeding assistance   4. If PO intake 25% or < x 3 days recommend consideration for appetite stimulant   5. Recommend anti nasuea medication as warranted   6. Recommend a bowel regimen (no BM x 4 days) as warranted   7. Weigh twice weekly    Goals:   1. Pt will tolerate and consume >75% EEN and EPN prior to RD follow up   2. Pt's nausea will be resolved prior to RD follow up   3. Pt will have a BM prior to RD follow up  Nutrition Goal Status: new  Communication of RD Recs: other (comment) (POC, sticky note)    Assessment and Plan    Nutrition Problem  Inadequate protein/energy intake   Altered GI function     Related to (etiology):   Decreased ability to consume sufficient protein/energy intake  Alteration in GI tract structure and/or function     Signs and Symptoms (as evidenced by):   Change in appetite, Estimated intake of food less than estimated needs   Nausea, Constipation     Interventions/Recommendations (treatment strategy):  1. Commercial beverage medical food supplement therapy  2. Feeding assistance management  3. Management of nutrition related prescription medications   4. Collaboration by nutrition professional with other providers    Nutrition Diagnosis Status:   New       Malnutrition Assessment     Skin (Micronutrient): none (Mando score = 14 (moderate risk)       Energy Intake (Malnutrition): less than or equal to 50% for greater than or equal to 5 days                         Reason for Assessment    Reason For Assessment: consult (MD order)  Diagnosis:  (Intractable low back pain)  General Information Comments:   12/4/24: 84 y.o. Female admitted for Intractable low back pain.  "PMH: UTI, Metastic lung cancer, NICKI; Hx: CHF, HLD, COPD, Heart attack. Pt currently on a Regular diet. RD consulted per MD order. Visited pt at bedside, pt was sleeping, pt's family members present. Pt family member reported pt's appetite was decreased to 0-25% x 5 days PTA, stated she ate some dinner last noght but no PO intake today, reported pt has slight nausea and constipation. RD provided a menu for the pt to encourage pt preferred food choices, dicussed protein/calorie benfits of Suplena, pt family member stated pt drinks ONS's at home and believes will be receptive to try, RD added to pt's orders and trays. Pt family member denied pt experiencing any N/D, abd pain or chewing/swallowing difficulties. Pt family member reported pt's UBW was 280 lbs but unsure how long ago, stated she has noticed pt weight loss in the last month. Unable to perform NFPE at this time d/t pt sleeping, will perform at follow up. LBM 11/30 (x 4 days no BM). Labs, meds, weights reviewed. Weights charted 10/16/24 180 lbs, 12/4/24 169 lbs (BMI 30.07, Obese), -11 lb wt loss (6% wt change) x 2 months. RD will continue to follow and monitor pt's nutritional status during admit.    Nutrition Discharge Planning: General healthful diet + a daily MTV + Boost plus as warranted    Nutrition Risk Screen    Nutrition Risk Screen: unintentional loss of 10 lbs or more in the past 2 months    Nutrition Related Social Determinants of Health: SDOH: Adequate food in home environment and None Identified    Nutrition/Diet History    Spiritual, Cultural Beliefs, Advent Practices, Values that Affect Care: no  Supplemental Drinks or Food Habits: Boost Original  Food Allergies: other (see comments) (Latex)  Factors Affecting Nutritional Intake: decreased appetite, nausea/vomiting, constipation    Anthropometrics    Temp: 98.6 °F (37 °C)  Height: 5' 3" (160 cm)  Height (inches): 63 in  Weight Method: Bed Scale  Weight: 77 kg (169 lb 12.1 oz)  Weight (lb): " "169.76 lb  Ideal Body Weight (IBW), Female: 115 lb  % Ideal Body Weight, Female (lb): 147.62 %  BMI (Calculated): 30.1  BMI Grade: 30 - 34.9- obesity - grade I  Weight Loss: unintentional  Usual Body Weight (UBW), k.81 kg  Weight Change Amount: 11 lb  % Usual Body Weight: 94.32  % Weight Change From Usual Weight: -5.88 %     Wt Readings from Last 15 Encounters:   24 77 kg (169 lb 12.1 oz)   24 77 kg (169 lb 11.2 oz)   10/16/24 81.8 kg (180 lb 5.4 oz)   24 81.8 kg (180 lb 6.4 oz)   24 81.8 kg (180 lb 6.4 oz)   24 83 kg (183 lb)   24 83.1 kg (183 lb 1.6 oz)   24 80.8 kg (178 lb 2.1 oz)   24 83.5 kg (184 lb 1.4 oz)   24 83.5 kg (184 lb 1.4 oz)   04/15/24 83.5 kg (184 lb)   24 83.5 kg (184 lb)   24 83.5 kg (184 lb)   24 83.6 kg (184 lb 3.2 oz)   10/23/23 85.7 kg (189 lb)     Lab/Procedures/Meds    Pertinent Labs Reviewed: reviewed  Pertinent Medications Reviewed: reviewed    BMP  Lab Results   Component Value Date     (L) 2024    K 4.4 2024     2024    CO2 20 (L) 2024    BUN 47 (H) 2024    CREATININE 1.5 (H) 2024    CALCIUM 8.7 2024    ANIONGAP 9 2024    EGFRNORACEVR 34 (A) 2024     Lab Results   Component Value Date    CALCIUM 8.7 2024    PHOS 4.1 2024     Lab Results   Component Value Date    ALBUMIN 2.5 (L) 2024     Lab Results   Component Value Date    ALT 9 (L) 2024    AST 16 2024    ALKPHOS 107 2024    BILITOT 0.6 2024     No results for input(s): "POCTGLUCOSE" in the last 24 hours.    Lab Results   Component Value Date    HGBA1C 6.3 (H) 2016     Lab Results   Component Value Date    WBC 10.70 2024    HGB 10.7 (L) 2024    HCT 35.2 (L) 2024    MCV 87 2024     (L) 2024       Scheduled Meds:   cefTRIAXone (Rocephin) IV (PEDS and ADULTS)  1 g Intravenous Q24H    heparin (porcine)  5,000 Units " Subcutaneous Q8H    lorazepam  2 mg Intravenous Once    sodium chloride 0.9%  10 mL Intravenous Q8H     Continuous Infusions:   0.9% NaCl   Intravenous Continuous 100 mL/hr at 12/04/24 0500 New Bag at 12/04/24 0500     PRN Meds:.  Current Facility-Administered Medications:     acetaminophen, 650 mg, Oral, Q4H PRN    cyclobenzaprine, 10 mg, Oral, TID PRN    HYDROcodone-acetaminophen, 1 tablet, Oral, Q6H PRN    HYDROmorphone, 1 mg, Intravenous, Q6H PRN    melatonin, 6 mg, Oral, Nightly PRN    morphine, 2 mg, Intravenous, Q4H PRN    naloxone, 0.02 mg, Intravenous, PRN    ondansetron, 8 mg, Oral, Q8H PRN    polyethylene glycol, 17 g, Oral, Daily PRN    promethazine, 25 mg, Oral, Q6H PRN    senna-docusate 8.6-50 mg, 1 tablet, Oral, BID PRN      Physical Findings/Assessment         Estimated/Assessed Needs    Weight Used For Calorie Calculations: 77 kg (169 lb 12.1 oz)  Energy Calorie Requirements (kcal): 9485-5815 kcals (25-30 kcals/kg ABW (Cancer)  Energy Need Method: Kcal/kg  Protein Requirements: 61-77 g (0.8-1.0 g/kg ABW (NICKI, no dialysis vs Cancer)  Weight Used For Protein Calculations: 77 kg (169 lb 12.1 oz)  Fluid Requirements (mL): 500 mL + total output  Estimated Fluid Requirement Method: other (see comments)  RDA Method (mL): 1925  CHO Requirement: 240-289 g (5853-9221 kcals/8)      Nutrition Prescription Ordered    Current Diet Order: Regular diet  Oral Nutrition Supplement: Suplena TID    Evaluation of Received Nutrient/Fluid Intake  I/O: (Net since admit):  12/4/24: -864 mL    Energy Calories Required: not meeting needs  Protein Required: not meeting needs  Fluid Required: not meeting needs  Total Fluid Intake (mL): 50  Tolerance: tolerating  % Intake of Estimated Energy Needs: 0 - 25 %  % Meal Intake: 0 - 25 %    Nutrition Risk    Level of Risk/Frequency of Follow-up: high (F/u x 2 weekly)       Monitor and Evaluation    Food and Nutrient Intake: energy intake, food and beverage intake  Food and Nutrient  Adminstration: diet order  Knowledge/Beliefs/Attitudes: food and nutrition knowledge/skill, beliefs and attitudes  Anthropometric Measurements: weight, weight change, body mass index  Biochemical Data, Medical Tests and Procedures: electrolyte and renal panel       Nutrition Follow-Up    RD Follow-up?: Yes  BRENY Guidry, RDN, LDN

## 2024-12-04 NOTE — SUBJECTIVE & OBJECTIVE
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    COPD (chronic obstructive pulmonary disease)     Coronary artery disease     Essential (primary) hypertension 12/15/2005    Heart attack     1999    Mass of lower lobe of right lung 11/1/2016    Simple chronic bronchitis 11/1/2016       Past Surgical History:   Procedure Laterality Date    APPENDECTOMY      BACK SURGERY      CORONARY ANGIOPLASTY WITH STENT PLACEMENT      HYSTERECTOMY      OTHER SURGICAL HISTORY      Abdominal Aneurysm    TONSILLECTOMY         Review of patient's allergies indicates:   Allergen Reactions    Codeine Nausea Only, Anaphylaxis and Other (See Comments)    Adhesive Itching, Blisters and Other (See Comments)     tape  tape    Latex, natural rubber Itching     Other reaction(s): Not available, Other (See Comments)       No current facility-administered medications on file prior to encounter.     Current Outpatient Medications on File Prior to Encounter   Medication Sig    albuterol-ipratropium (DUO-NEB) 2.5 mg-0.5 mg/3 mL nebulizer solution Take 3 mLs by nebulization every 6 (six) hours as needed for Wheezing. Rescue    fluticasone-umeclidin-vilanter (TRELEGY ELLIPTA) 100-62.5-25 mcg DsDv Inhale 1 puff into the lungs every morning.    gabapentin (NEURONTIN) 100 MG capsule Take 1 capsule (100 mg total) by mouth 3 (three) times daily.    losartan (COZAAR) 100 MG tablet TAKE 1 TABLET(100 MG) BY MOUTH EVERY DAY (Patient taking differently: Take 100 mg by mouth once daily.)    oxyCODONE-acetaminophen (PERCOCET)  mg per tablet Take 1 tablet by mouth every 4 (four) hours as needed for Pain.    sertraline (ZOLOFT) 100 MG tablet TAKE 1 TABLET BY MOUTH EVERY DAY (Patient taking differently: Take 100 mg by mouth once daily.)    acetaminophen (TYLENOL) 500 MG tablet Take  500 mg by mouth every 8 (eight) hours as needed for Pain. Indications: muscle pain    clobetasol 0.05% (TEMOVATE) 0.05 % Oint AAA on the scalp BID x 4-6 weeks then D/C. Restart PRN for flares.    fluocinolone (DERMA-SMOOTHE) 0.01 % external oil Part the hair at night, apply to flared areas on the scalp QHS PRN for itch.    permethrin (ELIMITE) 5 % cream Apply topically daily as needed.    pimecrolimus (ELIDEL) 1 % cream Apply topically 2 (two) times daily.    simvastatin (ZOCOR) 80 MG tablet TAKE 1 TABLET(80 MG) BY MOUTH EVERY EVENING (Patient taking differently: Take 80 mg by mouth nightly.)    [DISCONTINUED] benzonatate (TESSALON) 200 MG capsule Take 200 mg by mouth 3 (three) times daily as needed for Cough. Indications: cough    [DISCONTINUED] diclofenac sodium (VOLTAREN) 1 % Gel Apply 2 g topically once daily.    [DISCONTINUED] furosemide (LASIX) 20 MG tablet TAKE 1 TABLET(20 MG) BY MOUTH DAILY FOR FLUID RETENTION    [DISCONTINUED] traMADoL (ULTRAM) 50 mg tablet Take 50 mg by mouth every 8 (eight) hours as needed for Pain. Indications: pain    [DISCONTINUED] triamcinolone acetonide 0.1% (KENALOG) 0.1 % ointment Apply twice daily to affected areas of skin until improved, then can stop and restart as needed    [DISCONTINUED] vitamin D (VITAMIN D3) 1000 units Tab Take 1,000 Units by mouth once daily.     Family History       Problem Relation (Age of Onset)    Cancer Sister          Tobacco Use    Smoking status: Former     Current packs/day: 0.00     Types: Cigarettes     Quit date: 1999     Years since quittin.9    Smokeless tobacco: Never   Substance and Sexual Activity    Alcohol use: No    Drug use: No    Sexual activity: Never     Review of Systems   Constitutional:  Positive for activity change, appetite change, fatigue and unexpected weight change. Negative for chills, diaphoresis and fever.   HENT: Negative.     Eyes: Negative.  Negative for visual disturbance.   Respiratory: Negative.  Negative  for cough, shortness of breath and wheezing.    Cardiovascular: Negative.    Gastrointestinal: Negative.  Negative for abdominal distention.   Endocrine: Negative.    Genitourinary:  Positive for decreased urine volume.   Musculoskeletal:  Positive for arthralgias, back pain and gait problem.   Skin:  Positive for pallor.   Allergic/Immunologic: Negative.    Neurological:  Positive for weakness.   Hematological: Negative.    Psychiatric/Behavioral: Negative.     Objective:     Vital Signs (Most Recent):  Temp: 98.3 °F (36.8 °C) (12/03/24 2005)  Pulse: 81 (12/03/24 2041)  Resp: 20 (12/03/24 2005)  BP: (!) 104/57 (12/03/24 2005)  SpO2: (!) 90 % (12/03/24 2005) Vital Signs (24h Range):  Temp:  [97.8 °F (36.6 °C)-99.2 °F (37.3 °C)] 98.3 °F (36.8 °C)  Pulse:  [81-94] 81  Resp:  [14-22] 20  SpO2:  [90 %-96 %] 90 %  BP: (104-186)/(54-84) 104/57     Weight: 77 kg (169 lb 12.1 oz) (12/1/24)  Body mass index is 28.25 kg/m².     Physical Exam  Vitals and nursing note reviewed.   Constitutional:       General: She is awake. She is in acute distress.      Appearance: Normal appearance. She is cachectic. She is ill-appearing. She is not toxic-appearing.          Comments: Frail elderly lady, appears sick, cachectic and in lot of discomfort but NAD,    HENT:      Head: Normocephalic and atraumatic.      Right Ear: External ear normal.      Left Ear: External ear normal.      Nose: Nose normal.      Mouth/Throat:      Mouth: Mucous membranes are dry.      Pharynx: Oropharynx is clear. No oropharyngeal exudate or posterior oropharyngeal erythema.   Eyes:      General: No scleral icterus.     Extraocular Movements: Extraocular movements intact.      Conjunctiva/sclera: Conjunctivae normal.      Pupils: Pupils are equal, round, and reactive to light.   Cardiovascular:      Rate and Rhythm: Normal rate and regular rhythm.      Pulses: Normal pulses.      Heart sounds: Normal heart sounds. No murmur heard.     No friction rub.    Pulmonary:      Effort: Pulmonary effort is normal. No respiratory distress.      Breath sounds: Normal breath sounds. No wheezing.   Abdominal:      General: Abdomen is flat. Bowel sounds are normal. There is no distension.      Palpations: Abdomen is soft.   Genitourinary:     Comments: deferred  Musculoskeletal:         General: Tenderness present. No swelling, deformity or signs of injury. Normal range of motion.      Cervical back: Normal range of motion and neck supple.   Skin:     General: Skin is warm and dry.      Capillary Refill: Capillary refill takes 2 to 3 seconds.      Coloration: Skin is pale.      Findings: No erythema or rash.   Neurological:      General: No focal deficit present.      Mental Status: She is alert and oriented to person, place, and time.   Psychiatric:         Behavior: Behavior normal. Behavior is cooperative.         Thought Content: Thought content normal.         Judgment: Judgment normal.          CRANIAL NERVES     CN III, IV, VI   Pupils are equal, round, and reactive to light.    Results for orders placed or performed during the hospital encounter of 12/03/24   CBC Auto Differential    Collection Time: 12/03/24 12:25 PM   Result Value Ref Range    WBC 12.39 3.90 - 12.70 K/uL    RBC 4.18 4.00 - 5.40 M/uL    Hemoglobin 11.1 (L) 12.0 - 16.0 g/dL    Hematocrit 34.8 (L) 37.0 - 48.5 %    MCV 83 82 - 98 fL    MCH 26.6 (L) 27.0 - 31.0 pg    MCHC 31.9 (L) 32.0 - 36.0 g/dL    RDW 15.2 (H) 11.5 - 14.5 %    Platelets 130 (L) 150 - 450 K/uL    MPV 10.7 9.2 - 12.9 fL    Immature Granulocytes 0.6 (H) 0.0 - 0.5 %    Gran # (ANC) 11.4 (H) 1.8 - 7.7 K/uL    Immature Grans (Abs) 0.08 (H) 0.00 - 0.04 K/uL    Lymph # 0.2 (L) 1.0 - 4.8 K/uL    Mono # 0.5 0.3 - 1.0 K/uL    Eos # 0.3 0.0 - 0.5 K/uL    Baso # 0.02 0.00 - 0.20 K/uL    nRBC 0 0 /100 WBC    Gran % 91.9 (H) 38.0 - 73.0 %    Lymph % 1.4 (L) 18.0 - 48.0 %    Mono % 3.9 (L) 4.0 - 15.0 %    Eosinophil % 2.0 0.0 - 8.0 %    Basophil % 0.2  0.0 - 1.9 %    Platelet Estimate Decreased (A)     Differential Method Automated    Comprehensive Metabolic Panel    Collection Time: 12/03/24 12:25 PM   Result Value Ref Range    Sodium 134 (L) 136 - 145 mmol/L    Potassium 4.4 3.5 - 5.1 mmol/L    Chloride 104 95 - 110 mmol/L    CO2 17 (L) 23 - 29 mmol/L    Glucose 142 (H) 70 - 110 mg/dL    BUN 43 (H) 8 - 23 mg/dL    Creatinine 1.5 (H) 0.5 - 1.4 mg/dL    Calcium 9.1 8.7 - 10.5 mg/dL    Total Protein 6.5 6.0 - 8.4 g/dL    Albumin 3.0 (L) 3.5 - 5.2 g/dL    Total Bilirubin 0.9 0.1 - 1.0 mg/dL    Alkaline Phosphatase 118 40 - 150 U/L    AST 23 10 - 40 U/L    ALT 11 10 - 44 U/L    eGFR 34 (A) >60 mL/min/1.73 m^2    Anion Gap 13 8 - 16 mmol/L   BNP    Collection Time: 12/03/24 12:25 PM   Result Value Ref Range    BNP 1,873 (H) 0 - 99 pg/mL   Protime-INR    Collection Time: 12/03/24 12:25 PM   Result Value Ref Range    Prothrombin Time 11.9 9.0 - 12.5 sec    INR 1.1 0.8 - 1.2   APTT    Collection Time: 12/03/24 12:25 PM   Result Value Ref Range    aPTT 22.6 21.0 - 32.0 sec   Lactic Acid, Plasma    Collection Time: 12/03/24  6:02 PM   Result Value Ref Range    Lactate (Lactic Acid) 1.5 0.5 - 2.2 mmol/L   Lactic Acid, Plasma    Collection Time: 12/03/24  9:32 PM   Result Value Ref Range    Lactate (Lactic Acid) 1.8 0.5 - 2.2 mmol/L      Significant Labs: All pertinent labs within the past 24 hours have been reviewed.    Imaging Results              X-Ray Chest AP Portable (Final result)  Result time 12/03/24 13:08:01      Final result by Cam Obrien MD (12/03/24 13:08:01)                   Impression:      Worsening left-sided airspace opacity in comparison to the prior radiograph.  Underlying mass not excluded.      Electronically signed by: Cam Obrien  Date:    12/03/2024  Time:    13:08               Narrative:    EXAMINATION:  XR CHEST AP PORTABLE    CLINICAL HISTORY:  Low back pain, unspecified    TECHNIQUE:  Single frontal view of the chest was  performed.    COMPARISON:  Multiple    FINDINGS:  Suspect worsening left-sided pulmonary opacity in comparison to the prior radiograph.  Underlying mass not excluded.  No pleural fluid or pneumothorax.    The cardiac silhouette is enlarged.  The hilar and mediastinal contours are unremarkable.    Bones are intact.                                     Significant Imaging: I have reviewed all pertinent imaging results/findings within the past 24 hours.

## 2024-12-04 NOTE — CONSULTS
Medical Oncology Virtual Consult    The patient location is: Room 232  The chief complaint leading to consultation is: Lower back pain    Visit type: audiovisual    Face to Face time with patient: 30  60 minutes of total time spent on the encounter, which includes face to face time and non-face to face time preparing to see the patient (eg, review of tests), Obtaining and/or reviewing separately obtained history, Documenting clinical information in the electronic or other health record, Independently interpreting results (not separately reported) and communicating results to the patient/family/caregiver, or Care coordination (not separately reported).     Each patient to whom he or she provides medical services by telemedicine is:  (1) informed of the relationship between the physician and patient and the respective role of any other health care provider with respect to management of the patient; and (2) notified that he or she may decline to receive medical services by telemedicine and may withdraw from such care at any time.    Subjective:       Patient ID: Katrina Duran is a 84 y.o. female.    Chief Complaint: Back Pain (Pt. Presents to ED via [facility] due to having back pain and decreased appetite for the past week. Per EMS family states pt was seen in this ED over the weekend and found her Lung CA spread to her liver not actively on Chemo. Pt was given 15mg of toradol and 4mg of zofran en route to ED )      84 y.o. female with PMHx of HTN, COPD, Lung Ca and CAD s/p CABG in 1999 and stent in 2006, brought to ER by EMS for severe lower back pain that radiates to her right leg since July of this year. Patient is unable to stand and ambulate. She has lower extremity weakness and has severe pain when getting from lying position. She has a history of lung cancer, but all done at [facility], we do not have the records. The patient and family are not able to give timeline, but she did have radiation ~ 5 years ago for an early lung  cancer (from the description sounded like SBRT) and then RT again in November last year. PETCT in September 2023 looks to have bilateral hypermetabolic lesions. CTA chest in ED has progression of these lesions. CT lumbar spine done (she has a stent with unknown MRI compatibility) that shows an L3 compression fracture and marked degenerative disc disease, but no obvious metastatic disease. Per patient, she had an MRI of her back last year and tolerated this well.        Review of Systems:  Review of Systems   Constitutional:  Positive for activity change and fatigue. Negative for chills, fever and unexpected weight change.   HENT:  Negative for mouth sores, nosebleeds and sore throat.    Respiratory:  Negative for cough, shortness of breath and wheezing.    Cardiovascular:  Negative for chest pain, palpitations and leg swelling.   Gastrointestinal:  Negative for abdominal distention, abdominal pain and blood in stool.   Endocrine: Negative for cold intolerance and heat intolerance.   Genitourinary:  Negative for dysuria, flank pain and frequency.   Musculoskeletal:  Positive for back pain. Negative for arthralgias, joint swelling and myalgias.   Skin:  Negative for color change, rash and wound.   Neurological:  Positive for weakness. Negative for dizziness, light-headedness and headaches.   Hematological:  Negative for adenopathy. Does not bruise/bleed easily.        Objective:     Vitals:    12/04/24 0811   BP:    Pulse: 84   Resp: 18   Temp:      Physical Exam     Lab Review:   Lab Results   Component Value Date    WBC 10.70 12/04/2024    RBC 4.04 12/04/2024    HGB 10.7 (L) 12/04/2024    HCT 35.2 (L) 12/04/2024    MCV 87 12/04/2024    MCH 26.5 (L) 12/04/2024    MCHC 30.4 (L) 12/04/2024    RDW 15.2 (H) 12/04/2024     (L) 12/04/2024    MPV 10.9 12/04/2024    GRAN 9.6 (H) 12/04/2024    GRAN 89.4 (H) 12/04/2024    LYMPH 0.3 (L) 12/04/2024    LYMPH 2.6 (L) 12/04/2024    MONO 0.7 12/04/2024    MONO 6.7 12/04/2024     EOS 0.0 12/04/2024    BASO 0.03 12/04/2024    EOSINOPHIL 0.3 12/04/2024    BASOPHIL 0.3 12/04/2024     CMP  Sodium   Date Value Ref Range Status   12/04/2024 132 (L) 136 - 145 mmol/L Final     Potassium   Date Value Ref Range Status   12/04/2024 4.4 3.5 - 5.1 mmol/L Final     Chloride   Date Value Ref Range Status   12/04/2024 103 95 - 110 mmol/L Final     CO2   Date Value Ref Range Status   12/04/2024 20 (L) 23 - 29 mmol/L Final     Glucose   Date Value Ref Range Status   12/04/2024 145 (H) 70 - 110 mg/dL Final     BUN   Date Value Ref Range Status   12/04/2024 47 (H) 8 - 23 mg/dL Final     Creatinine   Date Value Ref Range Status   12/04/2024 1.5 (H) 0.5 - 1.4 mg/dL Final     Calcium   Date Value Ref Range Status   12/04/2024 8.7 8.7 - 10.5 mg/dL Final     Total Protein   Date Value Ref Range Status   12/04/2024 5.8 (L) 6.0 - 8.4 g/dL Final     Albumin   Date Value Ref Range Status   12/04/2024 2.5 (L) 3.5 - 5.2 g/dL Final     Total Bilirubin   Date Value Ref Range Status   12/04/2024 0.6 0.1 - 1.0 mg/dL Final     Comment:     For infants and newborns, interpretation of results should be based  on gestational age, weight and in agreement with clinical  observations.    Premature Infant recommended reference ranges:  Up to 24 hours.............<8.0 mg/dL  Up to 48 hours............<12.0 mg/dL  3-5 days..................<15.0 mg/dL  6-29 days.................<15.0 mg/dL       Alkaline Phosphatase   Date Value Ref Range Status   12/04/2024 107 40 - 150 U/L Final     AST   Date Value Ref Range Status   12/04/2024 16 10 - 40 U/L Final     ALT   Date Value Ref Range Status   12/04/2024 9 (L) 10 - 44 U/L Final     Anion Gap   Date Value Ref Range Status   12/04/2024 9 8 - 16 mmol/L Final     eGFR   Date Value Ref Range Status   12/04/2024 34 (A) >60 mL/min/1.73 m^2 Final     Imaging  Results for orders placed or performed during the hospital encounter of 12/01/24 (from the past 2160 hours)   CTA Chest Abdomen Pelvis     Narrative    EXAMINATION:  CTA CHEST ABDOMEN PELVIS, multiplanar reconstructions    CLINICAL HISTORY:  Aortic aneurysm, known or suspected;    TECHNIQUE:  Axial images through the chest, abdomen and pelvis were obtained with the use of IV contrast.  Sagittal, coronal and 3D MIP reconstructions are provided for review and permanently archived.  Oral contrast was not utilized.    COMPARISON:  September 21, 2023 abdomen CTA, September 19 2023 PET-CT scan    FINDINGS:  VASCULATURE:    The thoracic aorta is intact without evidence for aneurysm or dissection.  Moderate atherosclerotic calcifications noted.  Postoperative changes are seen along the anterior portions of the ascending aorta consistent with prior CABG.  Extensive coronary artery calcifications.  Mitral valve plane calcifications again seen.    The pulmonary vasculature is normal without evidence for pulmonary emboli.  The cardiac chambers are enlarged.    The aortic stent graft is patent.  Slightly worsening mural thrombus along the left side of the proximal stent graft noted.  The thickness of the aneurysm surrounding the stent graft measures up to 1.1 cm, not significantly changed from the comparison study.  Where 2 pieces of the stent graft meet, there appears to be some contrast between the margins (reference image 545 of series 3).  This is unchanged from the comparison study as well.  Negative for extravasation of contrast.    Again seen are changes of an AV fistula in the left groin with asymmetric contrast filling of the left femoral and iliac venous system as compared to the right.    CHEST: Interval progression of the amount of collapsed middle lobe/lingula.  The ground-glass/semi-solid opacity within the right lower lobe is increased in size in the interim, currently measuring 4.2 cm, with increased central solid component.  There is been interval development of a 7 mm spiculated nodule within the right lung apex as measured on axial image 147.   Stable middle lobe ground-glass opacity.  Stable inferior medial middle lobe scarring.  Stable elevation of the left hemidiaphragm.  Lungs are otherwise free of new pulmonary opacities.  Negative for pleural or pericardial effusions.    Negative for enlarged hilar, mediastinal, axillary or supraclavicular lymph nodes.    The airways are patent.  Densely rim calcified left thyroid nodule measuring 2.5 cm again seen.  The thyroid gland is otherwise normal.  The esophagus is normal.  Small to moderate hiatal hernia again seen.    ABDOMEN: There is been interval development of multiple inferior right hepatic lobe masses measuring up to 3.9 cm on axial image 527.  Atrophy of the left kidney again seen.  Stable 1.4 cm hyperdense nodule in the mid posterior right kidney.  Splenomegaly again seen.  The solid organs are otherwise unchanged in appearance.  The gallbladder and biliary tree are normal.    Stable prominent bishop hepatis lymph nodes.  Negative for ascites or inflammatory changes.    Increased stool burden.  Colonic interposition.  The patient has a mobile cecum.    I do not see a normal or an abnormal appendix.  Negative for free air.    PELVIS:  The urinary bladder is mildly distended.  The uterus is absent.  The rest of the female pelvic organs are normal.    Please see the lumbar spine CT scan performed the same day for details of the lower thoracic and lumbar region.  The lower cervical and upper thoracic spine is unchanged in appearance when compared to July 18, 2023 chest CT scan.  Stable median sternotomy wires.  The sternum is intact.    Shoddy groin lymph nodes again seen.    Laxity of the linea alba with fat filled umbilical hernia again seen.      Impression    1.  Detrimental change.  Interval increase in angular opacity within the lingula, likely a combination of partial collapse with possible underlying mass.  Interval increase in size of semi-solid right lower lobe nodule with increase central  solid component, currently measuring 4.2 cm.  Interval development of a 7 mm spiculated nodule in the right lung apex.  Interval development of at least 2 hepatic masses measuring up to 3.9 cm.  These changes must be considered neoplastic in origin until proven otherwise.  A repeat PET CT scan would be helpful.    2.  Negative for acute process involving the rest of the visualized osseous structures, considering the changes described on the recent lumbar spine CT scan performed earlier the same day.  Please see that report for details.    3.  Negative for acute vascular abnormalities.  The appearance of the aortic stent graft and surrounding soft tissues is unchanged.  Redemonstration of AV fistula centered in the left groin.    4.  Negative for pulmonary embolism, aneurysm or dissection.  Negative for intraperitoneal free air, free fluid or hemoperitoneum.    5.  Mildly increased stool burden.    6.  Mildly distended urinary bladder.  Bladder outlet obstruction symptoms?    7.  Numerous stable findings as noted above.    All CT scans at this facility are performed  using dose modulation techniques as appropriate to performed exam including the following:  automated exposure control; adjustment of mA and/or kV according to the patients size (this includes techniques or standardized protocols for targeted exams where dose is matched to indication/reason for exam: i.e. extremities or head);  iterative reconstruction technique.      Electronically signed by: Rich Torres MD  Date:    12/01/2024  Time:    08:14   CT Lumbar Spine Without Contrast    Narrative    EXAMINATION:  CT LUMBAR SPINE WITHOUT CONTRAST    CLINICAL HISTORY:  Low back pain, increased fracture risk;    TECHNIQUE:  Axial noncontrast CT scan of the lumbar spine with sagittal and coronal reconstructions.    COMPARISON:  Lumbar spine x-ray from August 8, 2024, abdomen and pelvis CT scan from September 21, 2023    FINDINGS:  Diffuse disc height reduction  most significantly involving L4/L5.  Multilevel vacuum disc phenomenon throughout the lower thoracic and lumbar region again seen.  Marked anterior marginal spondylosis, especially the upper lumbar spine.  Degenerative facet arthropathy without spondylolisthesis at this time.  Marked convex left curvature of the lumbar spine.  Schmorl node formation involves the superior endplate of L4, as well as multiple thoracic levels progressive osteopenia.    There are findings indicative of a nondisplaced fracture involving the posterior, inferior and left lateral corner of the L3 vertebral body (reference coronal image 90 and sagittal image 108)..  The lumbar vertebrae otherwise reveal normal alignment, shape and bone density. The lumbar vertebra are otherwise intact without evidence of fracture or dislocation.  There is multilevel moderate to severe nerve root foramen narrowing from posterior spondylosis mainly.  The most significantly involve level is L3/L4, where the thecal sac is narrowed to 7 mm.  Multilevel, multifactorial nerve root foramen narrowing noted as well.    Aortic stent graft remains in place.  There is some indistinction surrounding the stent graft, similar to the comparison CT scan marked atrophy of the left kidney again seen.  1.4 cm posterior midpole right renal hyperdense cyst, unchanged.  The surrounding soft tissues are otherwise normal.      Impression    1.  Findings concerning for a subtle nondisplaced fracture involving the inferior, posterior and left lateral corner of the L3 vertebral body.    2.  Negative for acute process otherwise involving the lumbar spine.  Marked degenerative disc changes and degenerative facet arthropathy noted diffusely with marked convex left curvature of the lumbar spine.  Multilevel, multifactorial moderate to severe spinal canal stenosis and nerve root foramen narrowing, most significantly involving the L3/L4 disc level.    3.  Aortic stent graft in place, with  similar amount of mild indistinction surrounding the stent graft when compared to the prior study, of doubtful clinical significance.    4.  Other stable findings as noted above.  This includes a stable 1.4 cm hyperdense cyst in the right kidney, which is been stable for over 1 year.    5.  A call report was made to Dr. Barbosa at 07:25 hours on December 1, 2024.    All CT scans at this facility are performed  using dose modulation techniques as appropriate to performed exam including the following:  automated exposure control; adjustment of mA and/or kV according to the patients size (this includes techniques or standardized protocols for targeted exams where dose is matched to indication/reason for exam: i.e. extremities or head);  iterative reconstruction technique.      Electronically signed by: Rich Torres MD  Date:    12/01/2024  Time:    07:26          Assessment:       1. Intractable low back pain    2. Low back pain    3. Chest pain    4. Mass of left lung         Plan:         Intractable lower back pain  No obvious metastatic disease in spine on CT scan. Patient needs an MRI C,T, and L spine to rule out metastatic disease causing her pain. Recommend obtaining records from recent MRI at [facility]. If she tolerated an MRI spine at that time, would be ok to obtain one this hospitalization.    Lung Cancer  Patient wishes to transfer care to [facility]. Need to obtain records from [facility] with progress notes with all relative oncology progress notes, radiation treatment plans, pathology reports, NGS reports, and imaging reports. It appears she has atleast progressing M1a metastatic lung cancer with bilateral lung disease, but unknown histology. We will have our intake navigator assist with obtaining records and scheduling as an outpatient.    Kale Mckeon MD  Medical Oncology  Director of the [facility]  [facility]

## 2024-12-04 NOTE — PLAN OF CARE
OT va completed. Patient with very poor tolerance for mobility. Recommending low intensity intervention with 24/7 SPV and A at d/c.

## 2024-12-04 NOTE — H&P
[facility] Medicine  History & Physical    Patient Name: Katrina Duran  MRN: [id number]  Patient Class: OP- Observation  Admission Date: 12/3/2024  Attending Physician: Reinier Salinas MD   Primary Care Provider: Liliya Freitas DO         Patient information was obtained from patient, relative(s), past medical records, and ER records.     Subjective:     Principal Problem:Intractable low back pain    Chief Complaint:   Chief Complaint   Patient presents with    Back Pain     Pt. Presents to ED via [facility] due to having back pain and decreased appetite for the past week. Per EMS family states pt was seen in this ED over the weekend and found her Lung CA spread to her liver not actively on Chemo. Pt was given 15mg of toradol and 4mg of zofran en route to ED         HPI: Katrina Duran is a 84 y.o. female with PMHx of HTN, COPD, Lung Ca and CAD s/p CABG in 1999 and stent in 2006, brought to ER by EMS for severe lower back pain that radiates to her right leg since July of this year. Pt reports she is unable to stand and ambulate, like her legs are giving out upon standing and experiences excruciating pain when getting out of bed. Pt notes that prior to ED visit, she urinated at around 12:30 AM. She denies any numbness upon wiping. Pt's daughter reports she has not eaten in the last few days. Pt reports having a subjective fever but no measured oral temperature. She also c/o nausea. Pt denies any fall, cough, CP, SOB, dysuria, and abd pain.     Pt has hx lung cancer and received radiation in November of 2023. She had appointment with her PCP yesterday and was told her lung cancer has increased in size. Pt was prescribed 5 mg Percocet yesterday by her PCP to manage her pain. Pt was also seen in ED last Saturday. She has an upcoming appointment with pain management.      In the ER, VSS Afeb, labs showed WBC 12.4, H/H 11/35, Segs 92Na 134, HCo3 17, Bun/Cr  43/1.5. Lactate 1.8, BNP 1873. UA ++ve on  12/1/24.     CT L/S on 12/1-  subtle nondisplaced fracture involving the inferior, posterior and left lateral corner of the L3 vertebral body. Marked degenerative disc changes and degenerative facet arthropathy noted diffusely with marked convex left curvature of the lumbar spine.  Multilevel, multifactorial moderate to severe spinal canal stenosis and nerve root foramen narrowing, most significantly involving the L3/L4 disc level.     3.  Aortic stent graft in place, with similar amount of mild indistinction surrounding the stent graft when compared to the prior study, of doubtful clinical significance.    CTA Chest Abdomen and Pelvis: 12/1/24  1.  Detrimental change.  Interval increase in angular opacity within the lingula, likely a combination of partial collapse with possible underlying mass.  Interval increase in size of semi-solid right lower lobe nodule with increase central solid component, currently measuring 4.2 cm.  Interval development of a 7 mm spiculated nodule in the right lung apex.  Interval development of at least 2 hepatic masses measuring up to 3.9 cm.  These changes must be considered neoplastic in origin until proven otherwise.  A repeat PET CT scan would be helpful.  4.  Negative for pulmonary embolism, aneurysm or dissection.  Negative for intraperitoneal free air, free fluid or hemoperitoneum.    Hosp Med consulted and pt admitted for acute intractable Lumbar pain sec to L3 vertebral fracture and metastatic Lung Ca with B lung mets as well as Liver mets. Pt also has UTI, NICKI and Dehydration. Elevated BNP likely sec to NICKI. Pt started on IV rocephin, NS, Norco and IV MS for pain control. Will consult XRT for possible L/S Palliative Radiation. NSGY was also contacted and did not consider it a neurosurgical case.         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    COPD (chronic obstructive pulmonary disease)     Coronary artery disease     Essential (primary) hypertension 12/15/2005    Heart attack     1999    Mass of lower lobe of right lung 11/1/2016    Simple chronic bronchitis 11/1/2016       Past Surgical History:   Procedure Laterality Date    APPENDECTOMY      BACK SURGERY      CORONARY ANGIOPLASTY WITH STENT PLACEMENT      HYSTERECTOMY      OTHER SURGICAL HISTORY      Abdominal Aneurysm    TONSILLECTOMY         Review of patient's allergies indicates:   Allergen Reactions    Codeine Nausea Only, Anaphylaxis and Other (See Comments)    Adhesive Itching, Blisters and Other (See Comments)     tape  tape    Latex, natural rubber Itching     Other reaction(s): Not available, Other (See Comments)       No current facility-administered medications on file prior to encounter.     Current Outpatient Medications on File Prior to Encounter   Medication Sig    albuterol-ipratropium (DUO-NEB) 2.5 mg-0.5 mg/3 mL nebulizer solution Take 3 mLs by nebulization every 6 (six) hours as needed for Wheezing. Rescue    fluticasone-umeclidin-vilanter (TRELEGY ELLIPTA) 100-62.5-25 mcg DsDv Inhale 1 puff into the lungs every morning.    gabapentin (NEURONTIN) 100 MG capsule Take 1 capsule (100 mg total) by mouth 3 (three) times daily.    losartan (COZAAR) 100 MG tablet TAKE 1 TABLET(100 MG) BY MOUTH EVERY DAY (Patient taking differently: Take 100 mg by mouth once daily.)    oxyCODONE-acetaminophen (PERCOCET)  mg per tablet Take 1 tablet by mouth every 4 (four) hours as needed for Pain.    sertraline (ZOLOFT) 100 MG tablet TAKE 1 TABLET BY MOUTH EVERY DAY (Patient taking differently: Take 100 mg by mouth once daily.)    acetaminophen (TYLENOL) 500 MG tablet Take 500 mg by mouth every 8 (eight) hours as needed for Pain. Indications: muscle pain    clobetasol 0.05% (TEMOVATE) 0.05 % Oint AAA on the scalp BID x 4-6 weeks then D/C. Restart  PRN for flares.    fluocinolone (DERMA-SMOOTHE) 0.01 % external oil Part the hair at night, apply to flared areas on the scalp QHS PRN for itch.    permethrin (ELIMITE) 5 % cream Apply topically daily as needed.    pimecrolimus (ELIDEL) 1 % cream Apply topically 2 (two) times daily.    simvastatin (ZOCOR) 80 MG tablet TAKE 1 TABLET(80 MG) BY MOUTH EVERY EVENING (Patient taking differently: Take 80 mg by mouth nightly.)    [DISCONTINUED] benzonatate (TESSALON) 200 MG capsule Take 200 mg by mouth 3 (three) times daily as needed for Cough. Indications: cough    [DISCONTINUED] diclofenac sodium (VOLTAREN) 1 % Gel Apply 2 g topically once daily.    [DISCONTINUED] furosemide (LASIX) 20 MG tablet TAKE 1 TABLET(20 MG) BY MOUTH DAILY FOR FLUID RETENTION    [DISCONTINUED] traMADoL (ULTRAM) 50 mg tablet Take 50 mg by mouth every 8 (eight) hours as needed for Pain. Indications: pain    [DISCONTINUED] triamcinolone acetonide 0.1% (KENALOG) 0.1 % ointment Apply twice daily to affected areas of skin until improved, then can stop and restart as needed    [DISCONTINUED] vitamin D (VITAMIN D3) 1000 units Tab Take 1,000 Units by mouth once daily.     Family History       Problem Relation (Age of Onset)    Cancer Sister          Tobacco Use    Smoking status: Former     Current packs/day: 0.00     Types: Cigarettes     Quit date: 1999     Years since quittin.9    Smokeless tobacco: Never   Substance and Sexual Activity    Alcohol use: No    Drug use: No    Sexual activity: Never     Review of Systems   Constitutional:  Positive for activity change, appetite change, fatigue and unexpected weight change. Negative for chills, diaphoresis and fever.   HENT: Negative.     Eyes: Negative.  Negative for visual disturbance.   Respiratory: Negative.  Negative for cough, shortness of breath and wheezing.    Cardiovascular: Negative.    Gastrointestinal: Negative.  Negative for abdominal distention.   Endocrine: Negative.     Genitourinary:  Positive for decreased urine volume.   Musculoskeletal:  Positive for arthralgias, back pain and gait problem.   Skin:  Positive for pallor.   Allergic/Immunologic: Negative.    Neurological:  Positive for weakness.   Hematological: Negative.    Psychiatric/Behavioral: Negative.     Objective:     Vital Signs (Most Recent):  Temp: 98.3 °F (36.8 °C) (12/03/24 2005)  Pulse: 81 (12/03/24 2041)  Resp: 20 (12/03/24 2005)  BP: (!) 104/57 (12/03/24 2005)  SpO2: (!) 90 % (12/03/24 2005) Vital Signs (24h Range):  Temp:  [97.8 °F (36.6 °C)-99.2 °F (37.3 °C)] 98.3 °F (36.8 °C)  Pulse:  [81-94] 81  Resp:  [14-22] 20  SpO2:  [90 %-96 %] 90 %  BP: (104-186)/(54-84) 104/57     Weight: 77 kg (169 lb 12.1 oz) (12/1/24)  Body mass index is 28.25 kg/m².     Physical Exam  Vitals and nursing note reviewed.   Constitutional:       General: She is awake. She is in acute distress.      Appearance: Normal appearance. She is cachectic. She is ill-appearing. She is not toxic-appearing.          Comments: Frail elderly lady, appears sick, cachectic and in lot of discomfort but NAD,    HENT:      Head: Normocephalic and atraumatic.      Right Ear: External ear normal.      Left Ear: External ear normal.      Nose: Nose normal.      Mouth/Throat:      Mouth: Mucous membranes are dry.      Pharynx: Oropharynx is clear. No oropharyngeal exudate or posterior oropharyngeal erythema.   Eyes:      General: No scleral icterus.     Extraocular Movements: Extraocular movements intact.      Conjunctiva/sclera: Conjunctivae normal.      Pupils: Pupils are equal, round, and reactive to light.   Cardiovascular:      Rate and Rhythm: Normal rate and regular rhythm.      Pulses: Normal pulses.      Heart sounds: Normal heart sounds. No murmur heard.     No friction rub.   Pulmonary:      Effort: Pulmonary effort is normal. No respiratory distress.      Breath sounds: Normal breath sounds. No wheezing.   Abdominal:      General: Abdomen is  flat. Bowel sounds are normal. There is no distension.      Palpations: Abdomen is soft.   Genitourinary:     Comments: deferred  Musculoskeletal:         General: Tenderness present. No swelling, deformity or signs of injury. Normal range of motion.      Cervical back: Normal range of motion and neck supple.   Skin:     General: Skin is warm and dry.      Capillary Refill: Capillary refill takes 2 to 3 seconds.      Coloration: Skin is pale.      Findings: No erythema or rash.   Neurological:      General: No focal deficit present.      Mental Status: She is alert and oriented to person, place, and time.   Psychiatric:         Behavior: Behavior normal. Behavior is cooperative.         Thought Content: Thought content normal.         Judgment: Judgment normal.          CRANIAL NERVES     CN III, IV, VI   Pupils are equal, round, and reactive to light.    Results for orders placed or performed during the hospital encounter of 12/03/24   CBC Auto Differential    Collection Time: 12/03/24 12:25 PM   Result Value Ref Range    WBC 12.39 3.90 - 12.70 K/uL    RBC 4.18 4.00 - 5.40 M/uL    Hemoglobin 11.1 (L) 12.0 - 16.0 g/dL    Hematocrit 34.8 (L) 37.0 - 48.5 %    MCV 83 82 - 98 fL    MCH 26.6 (L) 27.0 - 31.0 pg    MCHC 31.9 (L) 32.0 - 36.0 g/dL    RDW 15.2 (H) 11.5 - 14.5 %    Platelets 130 (L) 150 - 450 K/uL    MPV 10.7 9.2 - 12.9 fL    Immature Granulocytes 0.6 (H) 0.0 - 0.5 %    Gran # (ANC) 11.4 (H) 1.8 - 7.7 K/uL    Immature Grans (Abs) 0.08 (H) 0.00 - 0.04 K/uL    Lymph # 0.2 (L) 1.0 - 4.8 K/uL    Mono # 0.5 0.3 - 1.0 K/uL    Eos # 0.3 0.0 - 0.5 K/uL    Baso # 0.02 0.00 - 0.20 K/uL    nRBC 0 0 /100 WBC    Gran % 91.9 (H) 38.0 - 73.0 %    Lymph % 1.4 (L) 18.0 - 48.0 %    Mono % 3.9 (L) 4.0 - 15.0 %    Eosinophil % 2.0 0.0 - 8.0 %    Basophil % 0.2 0.0 - 1.9 %    Platelet Estimate Decreased (A)     Differential Method Automated    Comprehensive Metabolic Panel    Collection Time: 12/03/24 12:25 PM   Result Value Ref  Range    Sodium 134 (L) 136 - 145 mmol/L    Potassium 4.4 3.5 - 5.1 mmol/L    Chloride 104 95 - 110 mmol/L    CO2 17 (L) 23 - 29 mmol/L    Glucose 142 (H) 70 - 110 mg/dL    BUN 43 (H) 8 - 23 mg/dL    Creatinine 1.5 (H) 0.5 - 1.4 mg/dL    Calcium 9.1 8.7 - 10.5 mg/dL    Total Protein 6.5 6.0 - 8.4 g/dL    Albumin 3.0 (L) 3.5 - 5.2 g/dL    Total Bilirubin 0.9 0.1 - 1.0 mg/dL    Alkaline Phosphatase 118 40 - 150 U/L    AST 23 10 - 40 U/L    ALT 11 10 - 44 U/L    eGFR 34 (A) >60 mL/min/1.73 m^2    Anion Gap 13 8 - 16 mmol/L   BNP    Collection Time: 12/03/24 12:25 PM   Result Value Ref Range    BNP 1,873 (H) 0 - 99 pg/mL   Protime-INR    Collection Time: 12/03/24 12:25 PM   Result Value Ref Range    Prothrombin Time 11.9 9.0 - 12.5 sec    INR 1.1 0.8 - 1.2   APTT    Collection Time: 12/03/24 12:25 PM   Result Value Ref Range    aPTT 22.6 21.0 - 32.0 sec   Lactic Acid, Plasma    Collection Time: 12/03/24  6:02 PM   Result Value Ref Range    Lactate (Lactic Acid) 1.5 0.5 - 2.2 mmol/L   Lactic Acid, Plasma    Collection Time: 12/03/24  9:32 PM   Result Value Ref Range    Lactate (Lactic Acid) 1.8 0.5 - 2.2 mmol/L      Significant Labs: All pertinent labs within the past 24 hours have been reviewed.    Imaging Results              X-Ray Chest AP Portable (Final result)  Result time 12/03/24 13:08:01      Final result by Cam Obrien MD (12/03/24 13:08:01)                   Impression:      Worsening left-sided airspace opacity in comparison to the prior radiograph.  Underlying mass not excluded.      Electronically signed by: Cam Obrien  Date:    12/03/2024  Time:    13:08               Narrative:    EXAMINATION:  XR CHEST AP PORTABLE    CLINICAL HISTORY:  Low back pain, unspecified    TECHNIQUE:  Single frontal view of the chest was performed.    COMPARISON:  Multiple    FINDINGS:  Suspect worsening left-sided pulmonary opacity in comparison to the prior radiograph.  Underlying mass not excluded.  No pleural fluid  or pneumothorax.    The cardiac silhouette is enlarged.  The hilar and mediastinal contours are unremarkable.    Bones are intact.                                     Significant Imaging: I have reviewed all pertinent imaging results/findings within the past 24 hours.  Assessment/Plan:     * Intractable low back pain  Will consult Oncology and Radiation Oncology in am  Control pain with Norco and MS prn  NSGY has also evaluated the pain and she is not a NSGY case  Prognosis poor on account of underlying Lung Ca      NICKI (acute kidney injury)  NICKI is likely due to pre-renal azotemia due to dehydration. Baseline creatinine is  1.1 . Most recent creatinine and eGFR are listed below.  Recent Labs     12/01/24  0336 12/03/24  1225   CREATININE 1.1 1.5*   EGFRNORACEVR 49* 34*      Plan  - NICKI is worsening. Will continue current treatment  - Avoid nephrotoxins and renally dose meds for GFR listed above  - Monitor urine output, serial BMP, and adjust therapy as needed  - cont NS IVF    Metastatic lung cancer (metastasis from lung to other site)  Patient has Known metastatic cancer of Lung primary. The cancer has metastasized to Liver, opposite Lung and possibly Lumbar Spine. The patient is not under the care of an outpatient oncologist. The patient is not undergoing active chemotherapy. Their staging information is listed below.     Will consult Oncology and Radiation Oncology in am  Control pain with Norco and MS prn  NSGY has also evaluated the pain and she is not a NSGY case  Prognosis poor on account of underlying Lung Ca        UTI (urinary tract infection)  As seen on UA 12/1- asymptomatic, no obvious dysuria sec to severe LBP  Will Treat with IVF and IV Rocephin  Urine Cx from 12/1- Neg        VTE Risk Mitigation (From admission, onward)           Ordered     heparin (porcine) injection 5,000 Units  Every 8 hours         12/03/24 1837     IP VTE HIGH RISK PATIENT  Once         12/03/24 1837     Place sequential  compression device  Until discontinued         12/03/24 1837                  On 12/04/2024, patient should be placed in hospital observation services under my care.       Reinier Salinas MD  Department of Hospital Medicine  [facility] - Telemetry ([facility])

## 2024-12-04 NOTE — PT/OT/SLP EVAL
"Occupational Therapy Evaluation and Treatment    Name: Katrina Duran  MRN: [id number]  Admitting Diagnosis: Intractable low back pain  Recent Surgery: * No surgery found *      Recommendations:     Discharge Recommendations:  low intensity  Level of Assistance Recommended: 24 hours significant assistance and 24 hours supervision  Discharge Equipment Recommendations: hospital bed, wheelchair, bedside commode  Barriers to discharge: None    Assessment:     Katrina Duran is a 84 y.o. female with a medical diagnosis of Intractable low back pain. She presents with performance deficits affecting function including weakness, impaired endurance, impaired self care skills, impaired functional mobility, impaired balance, pain, impaired cardiopulmonary response to activity, orthopedic precautions, decreased lower extremity function, impaired sensation.    Rehab Prognosis: Poor; patient would benefit from acute OT services to address these deficits and reach maximum level of function.    Limited assessment due to severe pain. Initiated family training with education on recommendations for DME with rationale and transfer techniques. Will focus treatment on caregiver training.    Plan:     Patient to be seen 2 x/week to address the above listed problems via self-care/home management, therapeutic activities, therapeutic exercises  Plan of Care Expires: 12/18/24  Plan of Care Reviewed with: patient, daughter, grandchild(darryl)    Subjective     Chief Complaint: Reported "I can't do it today. It hurts so much."  Patient Comments/Goals: decrease pain  Pain/Comfort:  Pain Rating 1: 10/10  Location 1: back  Pain Addressed 1: Pre-medicate for activity, Reposition (activity pacing)    Social History:  Living Environment: Patient lives alone in a single story home with  no steps/stairs to enter. Plans to discharge to [place] with daughter in a [facility] and no steps to enter.  Prior Level of Function: Prior to two weeks ago, patient was " modified independent with ADLs and community distance ambulation via RW. Reports in the last two weeks she has had a rapid functional decline, including near bed ridden x5 days due to pain.  Roles and Routines: Patient was not driving and not working prior to admission.  Equipment Used at Home: rollator, raised toilet  DME owned (not currently used): none  Assistance Upon Discharge: family    Objective:     Communicated with nurse and EPIC prior to session. Patient found supine with peripheral IV, telemetry, oxygen upon OT entry to room.    General Precautions: Standard, fall   Orthopedic Precautions: spinal precautions (observed to minimize pain)   Braces: N/A    Respiratory Status: Nasal cannula, flow 2 L/min    Occupational Performance    Bed Mobility:   Rolling/Turning to Right with maximal assistance and of 2 persons  Provided patient and family with education on log rolling technique- including rationale for need to utilize  Wedged to R and supported patient with pillows to minimize pain    Functional Mobility/Transfers:  Declined participation due to severe pain   Educated patient on need to assess mobility to make recommendations for family on how to best mobilize her at home to maximize their and her safety.  In agreement to complete next date.    Activities of Daily Living:  Grooming: minimum assistance  Upper Body Dressing: moderate assistance    Cognitive/Visual Perceptual:  Cognitive/Psychosocial Skills:    -     Oriented to: Person, Place, Time, Situation  -     Follows Commands/attention: Follows one-step commands    Physical Exam:  Upper Extremity Range of Motion:     -       Right Upper Extremity: WFL  -       Left Upper Extremity: WFL  Upper Extremity Strength:    -       Right Upper Extremity: Deficits: grossly 4-/5  -       Left Upper Extremity: Deficits: grossly 4-/5    AMPAC 6 Click ADL:  AMPAC Total Score: 11    Treatment & Education:  Therapist provided facilitation and instruction of proper  body mechanics, energy conservation, and fall prevention strategies during tasks listed above  Patient educated on role of OT, POC, and goals for therapy  Encouraged completion of B UE AROM therex throughout the day to increase functional strength and activity tolerance needed for ADL completion.    Patient left right sidelying with HOB elevated with all lines intact, call button in reach, and family present.    GOALS:   Multidisciplinary Problems       Occupational Therapy Goals          Problem: Occupational Therapy    Goal Priority Disciplines Outcome Interventions   Occupational Therapy Goal     OT, PT/OT     Description: Goals to be met by: 12/18/24     Patient will increase functional independence with ADLs by performing:    Family demonstrates understanding of bed mobility and transfers to maximize patient engagement and decrease risk of injury.  UB dressing with setup  Increase functional strength in B UE grossly by 1/2 MM grade to decrease caregiver burden.                         History:     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    COPD (chronic obstructive pulmonary disease)     Coronary artery disease     Essential (primary) hypertension 12/15/2005    Heart attack     1999    Mass of lower lobe of right lung 11/1/2016    Simple chronic bronchitis 11/1/2016         Past Surgical History:   Procedure Laterality Date    APPENDECTOMY      BACK SURGERY      CORONARY ANGIOPLASTY WITH STENT PLACEMENT      HYSTERECTOMY      OTHER SURGICAL HISTORY      Abdominal Aneurysm    TONSILLECTOMY         Time Tracking:     OT Date of Treatment: 12/04/24  OT Start Time: 1015  OT Stop Time: 1040  OT Total Time (min): 25 min    Billable Minutes: Evaluation 15 and Therapeutic Activity 10    Carlyn Head, PEEWEE  12/4/2024

## 2024-12-04 NOTE — ASSESSMENT & PLAN NOTE
NICKI is likely due to pre-renal azotemia due to dehydration. Baseline creatinine is  1.1 . Most recent creatinine and eGFR are listed below.  Recent Labs     12/01/24  0336 12/03/24  1225   CREATININE 1.1 1.5*   EGFRNORACEVR 49* 34*      Plan  - NICKI is worsening. Will continue current treatment  - Avoid nephrotoxins and renally dose meds for GFR listed above  - Monitor urine output, serial BMP, and adjust therapy as needed  - cont NS IVF

## 2024-12-04 NOTE — PLAN OF CARE
Problem: Adult Inpatient Plan of Care  Goal: Plan of Care Review  Outcome: Progressing  Goal: Patient-Specific Goal (Individualized)  Outcome: Progressing  Goal: Absence of Hospital-Acquired Illness or Injury  Outcome: Progressing  Goal: Optimal Comfort and Wellbeing  Outcome: Progressing  Goal: Readiness for Transition of Care  Outcome: Progressing     Problem: Pneumonia  Goal: Fluid Balance  Outcome: Progressing  Goal: Resolution of Infection Signs and Symptoms  Outcome: Progressing  Goal: Effective Oxygenation and Ventilation  Outcome: Progressing     Problem: Fall Injury Risk  Goal: Absence of Fall and Fall-Related Injury  Outcome: Progressing     Problem: Skin Injury Risk Increased  Goal: Skin Health and Integrity  Outcome: Progressing     Problem: Acute Kidney Injury/Impairment  Goal: Fluid and Electrolyte Balance  Outcome: Progressing  Goal: Improved Oral Intake  Outcome: Progressing  Goal: Effective Renal Function  Outcome: Progressing

## 2024-12-04 NOTE — ASSESSMENT & PLAN NOTE
Patient has Known metastatic cancer of Lung primary. The cancer has metastasized to Liver, opposite Lung and possibly Lumbar Spine. The patient is not under the care of an outpatient oncologist. The patient is not undergoing active chemotherapy. Their staging information is listed below.     Will consult Oncology and Radiation Oncology in am  Control pain with Norco and MS prn  NSGY has also evaluated the pain and she is not a NSGY case  Prognosis poor on account of underlying Lung Ca

## 2024-12-04 NOTE — PLAN OF CARE
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 Will continue plan of care.         Valentín Coma Scale Score: 15     Lead Monitored: Lead II Rhythm: normal sinus rhythm    Cardiac/Telemetry Box Number: [id number]  VTE Core Measure: Pharmacological prophylaxis initiated/maintained Last Bowel Movement: 11/30/24  Diet Adult Regular     Mando Score: 14  Fall Risk Score: 7  Accucheck []   Freq?      Lines/Drains/Airways       Peripheral Intravenous Line  Duration                  Peripheral IV - Single Lumen 12/03/24 1224 18 G Anterior;Left Forearm <1 day

## 2024-12-04 NOTE — PLAN OF CARE
[facility] - Telemetry (Hospital)  Initial Discharge Assessment       Primary Care Provider: Liliya Freitas DO    Admission Diagnosis: Low back pain [M54.50]    Admission Date: 12/3/2024  Expected Discharge Date: Per Attending    Transition of Care Barriers: None    Payor: AET[employer] MANAGED MEDICARE / Plan: AETNA MEDICARE PLAN PPO / Product Type: Medicare Advantage /     Extended Emergency Contact Information  Primary Emergency Contact: Petra Bullockn   [facility]  Home Phone: [phone]  Relation: Daughter  Secondary Emergency Contact: TadeoSalma   [facility]  Home Phone: [phone]  Relation: Daughter    Discharge Plan A: Home with family         [id number] - [facility] [place] - [address] AT [facility] [address]  [address]  [facility] [address]  Phone: [phone] Fax: [phone]    [id number] - [place], [place] - [address] [place] [address] AT [facility] [address] [place] [address] [place] [id number]  Phone: [phone] Fax: [phone]      Initial Assessment (most recent)       Adult Discharge Assessment - 12/04/24 1127          Discharge Assessment    Assessment Type Discharge Planning Assessment     Confirmed/corrected address, phone number and insurance Yes     Confirmed Demographics Correct on Facesheet     Source of Information family     When was your last doctors appointment? 10/16/24   [facility] - Dermatology    Communicated LACIE with patient/caregiver Date not available/Unable to determine     Reason For Admission Intractable low back pain     People in Home alone     Facility Arrived From: home     Do you expect to return to your current living situation? Yes     Do you have help at home or someone to help you manage your care at home? Yes     Who are your caregiver(s) and their phone number(s)? Patient's children, 2 daughters and son are going to start staying with Patient.     Prior to hospitilization  cognitive status: Alert/Oriented     Walking or Climbing Stairs Difficulty no     Dressing/Bathing Difficulty no     Home Accessibility wheelchair accessible     Equipment Currently Used at Home raised toilet     Readmission within 30 days? No     Patient currently being followed by outpatient case management? No     Do you currently have service(s) that help you manage your care at home? No     Do you take prescription medications? Yes     Do you have prescription coverage? Yes     Coverage [employer] Managed Medicare     Do you have any problems affording any of your prescribed medications? No     Is the patient taking medications as prescribed? yes     Who is going to help you get home at discharge? 2 adult daughter and adult son     How do you get to doctors appointments? family or friend will provide     Are you on dialysis? No     Do you take coumadin? No     Discharge Plan A Home with family     DME Needed Upon Discharge  hospital bed;bedside commode;wheelchair     Discharge Plan discussed with: Patient;Adult children     Transition of Care Barriers None        Transportation Needs    Has the lack of transportation kept you from medical appointments, meetings, work or from getting things needed for daily living? No                   Anticipated DC dispo: home with family  Prior Level of Function: independent with ADLs - Patient's family reports decline started about 1 week ago  People in home: alone, Patient's children will start staying with Patient upon DC home    Comments:  SW met with patient, daughter - Shannon Bullock, and granddaughter at bedside to introduce role and discuss discharge planning. Patient's daughters and son will be help at home and Patients' family or [facility] can provide transport at time of discharge. Confirmed demographics, insurance, and emergency contacts. SW updated Patient's whiteboard with contact information and anticipated DC plan. CM discharge needs depends on hospital  progress. SW will continue following to assist with other needs.

## 2024-12-04 NOTE — PT/OT/SLP EVAL
"Physical Therapy Evaluation and Treatment    Patient Name:  Katrina Duran   MRN:  [id number]    Recommendations:     Discharge Recommendations: Low Intensity Therapy (WITH 24 HOUR CARE)   Discharge Equipment Recommendations: wheelchair, bedside commode, hospital bed   Barriers to discharge: None    Assessment:     Katrina Duran is a 84 y.o. female admitted with a medical diagnosis of Intractable low back pain.  She presents with the following impairments/functional limitations: weakness, impaired endurance, impaired functional mobility, impaired balance, pain, decreased safety awareness, decreased lower extremity function, decreased upper extremity function, decreased coordination.    Rehab Prognosis: Poor; patient would benefit from acute skilled PT services to address these deficits and reach maximum level of function.    Recent Surgery: * No surgery found *    Pt severely limited by pain that will affect functional progress    Plan:     During this hospitalization, patient to be seen 3 x/week to address the identified rehab impairments via therapeutic activities, therapeutic exercises and progress toward the following goals:    Plan of Care Expires:  12/18/24    Subjective     Chief Complaint: "I just can't do it today" Pt c/o severe pain from earlier just starting to have relief with iv pain med  Patient/Family Comments/goals: relief from pain  Pain/Comfort:  Pain Rating 1: 10/10  Location 1: back  Pain Addressed 1: Reposition, Pre-medicate for activity  B THIGHS: PT REPORTS R THIGH MORE PAINFUL THAN L THIGH    Patients cultural, spiritual, Mormon conflicts given the current situation:     Living Environment:  Living Environment: Patient lives alone in a single story home with  no steps/stairs to enter. Plans to discharge to [place] with daughter in a [facility] and no steps to enter.  Prior Level of Function: Prior to two weeks ago, patient was modified independent with ADLs and community distance ambulation via " "RW. Reports in the last two weeks she has had a rapid functional decline, including near bed ridden x5 days due to pain.  Roles and Routines: Patient was not driving and not working prior to admission.  Equipment Used at Home: rollator, raised toilet  DME owned (not currently used): none  Assistance Upon Discharge: family    Objective:     Communicated with nurse and epic prior to session.  Patient found supine with telemetry, peripheral IV, oxygen  upon PT entry to room.    General Precautions: Standard, fall  Orthopedic Precautions:spinal precautions (TO MINIMIZE PAIN)   Braces: N/A  Respiratory Status: Nasal cannula, flow 2 L/min    Exams:  Cognitive Exam:  Patient is oriented to Person, Place, Time, and Situation  Sensation:    -       Impaired  light/touch LLE  RLE ROM: WFL  RLE Strength: GROSSLY 4-/5  LLE ROM: WFL  LLE Strength: GROSSLY 3/5    Functional Mobility:  Bed Mobility:     Rolling Right: maximal assistance and of 2 persons  WEDGE TO R SIDE FOR COMFORT AND PAIN RELIEF  PT DECLINED FURTHER PARTICIPATION DUE TO C/O SEVERE PAIN  EDUCATED IN IMPORTANCE OF CHANGING POSITIONS OFTEN IN BED FOR PRESSURE RELIEF  EDUCATED ON IMPORTANCE OF PARTICIPATION IN BED MOBILITY FOR EDUCATION ON CORRECT TECHNIQUE TO DECREASE PAIN WITH MOVEMENT    AM-PAC 6 CLICK MOBILITY  Total Score:7     Treatment & Education:  PT EDUCATION:  - ROLE OF P.T. AND POC IN ACUTE CARE HOSPITAL SETTING  - RISK FOR FALLS DUE TO GENERALIZED WEAKNESS, EDUCATED ON "CALL DON'T FALL", ENCOURAGED TO CALL FOR ASSISTANCE WITH ALL NEEDS   - INITIATED EDUCATION WITH FAMILY ON SAFE TECHNIQUES FOR BED MOBILITY AS WELL AS RECOMMENDATION FOR DME AT HOME    Patient left right sidelying with all lines intact, call button in reach, NURSE notified, and FAMILY present.    GOALS:   Multidisciplinary Problems       Physical Therapy Goals          Problem: Physical Therapy    Goal Priority Disciplines Outcome Interventions   Physical Therapy Goal     PT, PT/OT   "   Description: LTG'S TO BE MET IN 14 DAYS (12-18-24)  PT WILL REQUIRE ULISES FOR BED MOBILITY  PT WILL REQUIRE ULISES FOR SIT<>STAND TF USING RW  PT WILL REQUIRE ULISES FOR BED<>CHAIR TF USING RW  PT WILL INC AMPAC SCORE BY 2 POINTS TO PROGRESS GROSS FUNC MOBILITY                         History:     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    COPD (chronic obstructive pulmonary disease)     Coronary artery disease     Essential (primary) hypertension 12/15/2005    Heart attack     1999    Mass of lower lobe of right lung 11/1/2016    Simple chronic bronchitis 11/1/2016       Past Surgical History:   Procedure Laterality Date    APPENDECTOMY      BACK SURGERY      CORONARY ANGIOPLASTY WITH STENT PLACEMENT      HYSTERECTOMY      OTHER SURGICAL HISTORY      Abdominal Aneurysm    TONSILLECTOMY         Time Tracking:     PT Received On: 12/04/24  PT Start Time: 1025     PT Stop Time: 1050  PT Total Time (min): 25 min     Billable Minutes: Evaluation 15 and Therapeutic Activity 10    12/04/2024

## 2024-12-04 NOTE — PLAN OF CARE
P.T. EVAL COMPLETE.  PT CURRENTLY REQUIRES MAXA X 2 FOR BED MOBILITY.  P.T. RECOMMENDS LOW INTENSITY THERAPY UPON DISCHARGE WITH 24 HOUR CARE

## 2024-12-04 NOTE — PLAN OF CARE
Problem: Adult Inpatient Plan of Care  Goal: Plan of Care Review  Outcome: Progressing  Goal: Patient-Specific Goal (Individualized)  Outcome: Progressing  Goal: Absence of Hospital-Acquired Illness or Injury  Outcome: Progressing  Goal: Optimal Comfort and Wellbeing  Outcome: Progressing  Goal: Readiness for Transition of Care  Outcome: Progressing     Problem: Pneumonia  Goal: Fluid Balance  Outcome: Progressing  Goal: Resolution of Infection Signs and Symptoms  Outcome: Progressing  Goal: Effective Oxygenation and Ventilation  Outcome: Progressing     Problem: Fall Injury Risk  Goal: Absence of Fall and Fall-Related Injury  Outcome: Progressing     Problem: Skin Injury Risk Increased  Goal: Skin Health and Integrity  Outcome: Progressing     Problem: Acute Kidney Injury/Impairment  Goal: Fluid and Electrolyte Balance  Outcome: Progressing  Goal: Improved Oral Intake  Outcome: Progressing  Goal: Effective Renal Function  Outcome: Progressing     Problem: Infection  Goal: Absence of Infection Signs and Symptoms  Outcome: Progressing

## 2024-12-04 NOTE — ASSESSMENT & PLAN NOTE
Will consult Oncology and Radiation Oncology in am  Control pain with Norco and MS prn  NSGY has also evaluated the pain and she is not a NSGY case  Prognosis poor on account of underlying Lung Ca

## 2024-12-04 NOTE — PLAN OF CARE
Nutrition Recommendations/Interventions 12/4/24:   1. Recommend pt continues on a Regular diet as medically approrpiate   2. Recommend pt continues Suplena TID to assist filling nutritional gaps   3. Encourage PO and supplement intake, recommend feeding assistance   4. If PO intake 25% or < x 3 days recommend consideration for appetite stimulant   5. Recommend anti nasuea medication as warranted   6. Recommend a bowel regimen (no BM x 4 days) as warranted   7. Weigh twice weekly  8. Collaboration by nutrition professional with other providers     Goals:   1. Pt will tolerate and consume >75% EEN and EPN prior to RD follow up   2. Pt's nausea will be resolved prior to RD follow up   3. Pt will have a BM prior to RD follow up    BERNY Guidry, RDN, LDN

## 2024-12-04 NOTE — HPI
Katrina Duran is a 84 y.o. female with PMHx of HTN, COPD, Lung Ca and CAD s/p CABG in 1999 and stent in 2006, brought to ER by EMS for severe lower back pain that radiates to her right leg since July of this year. Pt reports she is unable to stand and ambulate, like her legs are giving out upon standing and experiences excruciating pain when getting out of bed. Pt notes that prior to ED visit, she urinated at around 12:30 AM. She denies any numbness upon wiping. Pt's daughter reports she has not eaten in the last few days. Pt reports having a subjective fever but no measured oral temperature. She also c/o nausea. Pt denies any fall, cough, CP, SOB, dysuria, and abd pain.     Pt has hx lung cancer and received radiation in November of 2023. She had appointment with her PCP yesterday and was told her lung cancer has increased in size. Pt was prescribed 5 mg Percocet yesterday by her PCP to manage her pain. Pt was also seen in ED last Saturday. She has an upcoming appointment with pain management.      In the ER, VSS Afeb, labs showed WBC 12.4, H/H 11/35, Segs 92Na 134, HCo3 17, Bun/Cr  43/1.5. Lactate 1.8, BNP 1873. UA ++ve on 12/1/24.     CT L/S on 12/1-  subtle nondisplaced fracture involving the inferior, posterior and left lateral corner of the L3 vertebral body. Marked degenerative disc changes and degenerative facet arthropathy noted diffusely with marked convex left curvature of the lumbar spine.  Multilevel, multifactorial moderate to severe spinal canal stenosis and nerve root foramen narrowing, most significantly involving the L3/L4 disc level.     3.  Aortic stent graft in place, with similar amount of mild indistinction surrounding the stent graft when compared to the prior study, of doubtful clinical significance.    CTA Chest Abdomen and Pelvis: 12/1/24  1.  Detrimental change.  Interval increase in angular opacity within the lingula, likely a combination of partial collapse with possible underlying mass.   Interval increase in size of semi-solid right lower lobe nodule with increase central solid component, currently measuring 4.2 cm.  Interval development of a 7 mm spiculated nodule in the right lung apex.  Interval development of at least 2 hepatic masses measuring up to 3.9 cm.  These changes must be considered neoplastic in origin until proven otherwise.  A repeat PET CT scan would be helpful.  4.  Negative for pulmonary embolism, aneurysm or dissection.  Negative for intraperitoneal free air, free fluid or hemoperitoneum.    Hosp Med consulted and pt admitted for acute intractable Lumbar pain sec to L3 vertebral fracture and metastatic Lung Ca with B lung mets as well as Liver mets. Pt also has UTI, NICKI and Dehydration. Elevated BNP likely sec to NICKI. Pt started on IV rocephin, NS, Norco and IV MS for pain control. Will consult XRT for possible L/S Palliative Radiation. KRISHNA was also contacted and did not consider it a neurosurgical case.

## 2024-12-05 PROBLEM — J96.01 ACUTE HYPOXEMIC RESPIRATORY FAILURE: Status: ACTIVE | Noted: 2024-12-05

## 2024-12-05 PROBLEM — Z71.89 ADVANCED CARE PLANNING/COUNSELING DISCUSSION: Status: ACTIVE | Noted: 2024-12-05

## 2024-12-05 LAB
ADENOVIRUS: NOT DETECTED
ALBUMIN SERPL BCP-MCNC: 2.4 G/DL (ref 3.5–5.2)
ALBUMIN/CREAT UR: 40.7 UG/MG (ref 0–30)
ALP SERPL-CCNC: 106 U/L (ref 40–150)
ALT SERPL W/O P-5'-P-CCNC: 14 U/L (ref 10–44)
ANION GAP SERPL CALC-SCNC: 9 MMOL/L (ref 8–16)
AST SERPL-CCNC: 16 U/L (ref 10–40)
BASOPHILS # BLD AUTO: 0.01 K/UL (ref 0–0.2)
BASOPHILS NFR BLD: 0.1 % (ref 0–1.9)
BILIRUB SERPL-MCNC: 0.5 MG/DL (ref 0.1–1)
BORDETELLA PARAPERTUSSIS (IS1001): NOT DETECTED
BORDETELLA PERTUSSIS (PTXP): NOT DETECTED
BUN SERPL-MCNC: 48 MG/DL (ref 8–23)
CALCIUM SERPL-MCNC: 8.4 MG/DL (ref 8.7–10.5)
CHLAMYDIA PNEUMONIAE: NOT DETECTED
CHLORIDE SERPL-SCNC: 107 MMOL/L (ref 95–110)
CO2 SERPL-SCNC: 17 MMOL/L (ref 23–29)
CORONAVIRUS 229E, COMMON COLD VIRUS: NOT DETECTED
CORONAVIRUS HKU1, COMMON COLD VIRUS: NOT DETECTED
CORONAVIRUS NL63, COMMON COLD VIRUS: NOT DETECTED
CORONAVIRUS OC43, COMMON COLD VIRUS: NOT DETECTED
CREAT SERPL-MCNC: 1.3 MG/DL (ref 0.5–1.4)
CREAT UR-MCNC: 86 MG/DL (ref 15–325)
CREAT UR-MCNC: 86 MG/DL (ref 15–325)
DIFFERENTIAL METHOD BLD: ABNORMAL
EOSINOPHIL # BLD AUTO: 0 K/UL (ref 0–0.5)
EOSINOPHIL NFR BLD: 0.4 % (ref 0–8)
ERYTHROCYTE [DISTWIDTH] IN BLOOD BY AUTOMATED COUNT: 15.2 % (ref 11.5–14.5)
EST. GFR  (NO RACE VARIABLE): 40 ML/MIN/1.73 M^2
FLUBV RNA NPH QL NAA+NON-PROBE: NOT DETECTED
GLUCOSE SERPL-MCNC: 174 MG/DL (ref 70–110)
HCT VFR BLD AUTO: 32.3 % (ref 37–48.5)
HGB BLD-MCNC: 10.2 G/DL (ref 12–16)
HPIV1 RNA NPH QL NAA+NON-PROBE: NOT DETECTED
HPIV2 RNA NPH QL NAA+NON-PROBE: NOT DETECTED
HPIV3 RNA NPH QL NAA+NON-PROBE: NOT DETECTED
HPIV4 RNA NPH QL NAA+NON-PROBE: NOT DETECTED
HUMAN METAPNEUMOVIRUS: NOT DETECTED
IMM GRANULOCYTES # BLD AUTO: 0.05 K/UL (ref 0–0.04)
IMM GRANULOCYTES NFR BLD AUTO: 0.5 % (ref 0–0.5)
INFLUENZA A (SUBTYPES H1,H1-2009,H3): NOT DETECTED
LYMPHOCYTES # BLD AUTO: 0.1 K/UL (ref 1–4.8)
LYMPHOCYTES NFR BLD: 1.2 % (ref 18–48)
MAGNESIUM SERPL-MCNC: 2.2 MG/DL (ref 1.6–2.6)
MCH RBC QN AUTO: 26.7 PG (ref 27–31)
MCHC RBC AUTO-ENTMCNC: 31.6 G/DL (ref 32–36)
MCV RBC AUTO: 85 FL (ref 82–98)
MICROALBUMIN UR DL<=1MG/L-MCNC: 35 UG/ML
MONOCYTES # BLD AUTO: 0.2 K/UL (ref 0.3–1)
MONOCYTES NFR BLD: 2.2 % (ref 4–15)
MYCOPLASMA PNEUMONIAE: NOT DETECTED
NEUTROPHILS # BLD AUTO: 9.2 K/UL (ref 1.8–7.7)
NEUTROPHILS NFR BLD: 95.6 % (ref 38–73)
NRBC BLD-RTO: 0 /100 WBC
OHS QRS DURATION: 104 MS
OHS QTC CALCULATION: 416 MS
PHOSPHATE SERPL-MCNC: 4.3 MG/DL (ref 2.7–4.5)
PLATELET # BLD AUTO: 130 K/UL (ref 150–450)
PMV BLD AUTO: 10.5 FL (ref 9.2–12.9)
POTASSIUM SERPL-SCNC: 4.8 MMOL/L (ref 3.5–5.1)
PROT SERPL-MCNC: 5.6 G/DL (ref 6–8.4)
RBC # BLD AUTO: 3.82 M/UL (ref 4–5.4)
RESPIRATORY INFECTION PANEL SOURCE: NORMAL
RSV RNA NPH QL NAA+NON-PROBE: NOT DETECTED
RV+EV RNA NPH QL NAA+NON-PROBE: NOT DETECTED
SARS-COV-2 RNA RESP QL NAA+PROBE: NOT DETECTED
SODIUM SERPL-SCNC: 133 MMOL/L (ref 136–145)
WBC # BLD AUTO: 9.64 K/UL (ref 3.9–12.7)

## 2024-12-05 PROCEDURE — 99900035 HC TECH TIME PER 15 MIN (STAT)

## 2024-12-05 PROCEDURE — 25000003 PHARM REV CODE 250: Performed by: EMERGENCY MEDICINE

## 2024-12-05 PROCEDURE — A4216 STERILE WATER/SALINE, 10 ML: HCPCS | Performed by: EMERGENCY MEDICINE

## 2024-12-05 PROCEDURE — 94640 AIRWAY INHALATION TREATMENT: CPT

## 2024-12-05 PROCEDURE — 63600175 PHARM REV CODE 636 W HCPCS

## 2024-12-05 PROCEDURE — 11000001 HC ACUTE MED/SURG PRIVATE ROOM

## 2024-12-05 PROCEDURE — 25000003 PHARM REV CODE 250

## 2024-12-05 PROCEDURE — 94761 N-INVAS EAR/PLS OXIMETRY MLT: CPT

## 2024-12-05 PROCEDURE — 0T9B70Z DRAINAGE OF BLADDER WITH DRAINAGE DEVICE, VIA NATURAL OR ARTIFICIAL OPENING: ICD-10-PCS | Performed by: STUDENT IN AN ORGANIZED HEALTH CARE EDUCATION/TRAINING PROGRAM

## 2024-12-05 PROCEDURE — 94799 UNLISTED PULMONARY SVC/PX: CPT

## 2024-12-05 PROCEDURE — 36415 COLL VENOUS BLD VENIPUNCTURE: CPT | Performed by: EMERGENCY MEDICINE

## 2024-12-05 PROCEDURE — 63600175 PHARM REV CODE 636 W HCPCS: Performed by: INTERNAL MEDICINE

## 2024-12-05 PROCEDURE — 63600175 PHARM REV CODE 636 W HCPCS: Performed by: EMERGENCY MEDICINE

## 2024-12-05 PROCEDURE — 92610 EVALUATE SWALLOWING FUNCTION: CPT

## 2024-12-05 PROCEDURE — 84100 ASSAY OF PHOSPHORUS: CPT | Performed by: EMERGENCY MEDICINE

## 2024-12-05 PROCEDURE — 25000003 PHARM REV CODE 250: Performed by: INTERNAL MEDICINE

## 2024-12-05 PROCEDURE — 25000242 PHARM REV CODE 250 ALT 637 W/ HCPCS

## 2024-12-05 PROCEDURE — 27000221 HC OXYGEN, UP TO 24 HOURS

## 2024-12-05 PROCEDURE — 83735 ASSAY OF MAGNESIUM: CPT | Performed by: EMERGENCY MEDICINE

## 2024-12-05 PROCEDURE — 21400001 HC TELEMETRY ROOM

## 2024-12-05 PROCEDURE — 85025 COMPLETE CBC W/AUTO DIFF WBC: CPT | Performed by: EMERGENCY MEDICINE

## 2024-12-05 PROCEDURE — 92523 SPEECH SOUND LANG COMPREHEN: CPT

## 2024-12-05 PROCEDURE — 80053 COMPREHEN METABOLIC PANEL: CPT | Performed by: EMERGENCY MEDICINE

## 2024-12-05 RX ORDER — GABAPENTIN 100 MG/1
100 CAPSULE ORAL 3 TIMES DAILY
Status: DISCONTINUED | OUTPATIENT
Start: 2024-12-05 | End: 2024-12-10 | Stop reason: HOSPADM

## 2024-12-05 RX ORDER — INSULIN ASPART 100 [IU]/ML
0-5 INJECTION, SOLUTION INTRAVENOUS; SUBCUTANEOUS
Status: DISCONTINUED | OUTPATIENT
Start: 2024-12-05 | End: 2024-12-05

## 2024-12-05 RX ORDER — IBUPROFEN 200 MG
24 TABLET ORAL
Status: DISCONTINUED | OUTPATIENT
Start: 2024-12-05 | End: 2024-12-05

## 2024-12-05 RX ORDER — IBUPROFEN 200 MG
16 TABLET ORAL
Status: DISCONTINUED | OUTPATIENT
Start: 2024-12-05 | End: 2024-12-05

## 2024-12-05 RX ORDER — PANTOPRAZOLE SODIUM 40 MG/10ML
40 INJECTION, POWDER, LYOPHILIZED, FOR SOLUTION INTRAVENOUS DAILY
Status: DISCONTINUED | OUTPATIENT
Start: 2024-12-05 | End: 2024-12-05

## 2024-12-05 RX ORDER — PANTOPRAZOLE SODIUM 40 MG/1
40 TABLET, DELAYED RELEASE ORAL DAILY
Status: DISCONTINUED | OUTPATIENT
Start: 2024-12-06 | End: 2024-12-10 | Stop reason: HOSPADM

## 2024-12-05 RX ORDER — SERTRALINE HYDROCHLORIDE 50 MG/1
100 TABLET, FILM COATED ORAL NIGHTLY
Status: DISCONTINUED | OUTPATIENT
Start: 2024-12-05 | End: 2024-12-10 | Stop reason: HOSPADM

## 2024-12-05 RX ORDER — ATORVASTATIN CALCIUM 40 MG/1
40 TABLET, FILM COATED ORAL DAILY
Status: DISCONTINUED | OUTPATIENT
Start: 2024-12-05 | End: 2024-12-10 | Stop reason: HOSPADM

## 2024-12-05 RX ORDER — LIDOCAINE 50 MG/G
1 PATCH TOPICAL
Status: DISCONTINUED | OUTPATIENT
Start: 2024-12-05 | End: 2024-12-10 | Stop reason: HOSPADM

## 2024-12-05 RX ORDER — MUPIROCIN 20 MG/G
OINTMENT TOPICAL 2 TIMES DAILY
Status: DISPENSED | OUTPATIENT
Start: 2024-12-05 | End: 2024-12-10

## 2024-12-05 RX ORDER — LORAZEPAM 2 MG/ML
1 INJECTION INTRAMUSCULAR
Status: COMPLETED | OUTPATIENT
Start: 2024-12-05 | End: 2024-12-06

## 2024-12-05 RX ORDER — GLUCAGON 1 MG
1 KIT INJECTION
Status: DISCONTINUED | OUTPATIENT
Start: 2024-12-05 | End: 2024-12-05

## 2024-12-05 RX ADMIN — BUDESONIDE INHALATION 0.5 MG: 0.5 SUSPENSION RESPIRATORY (INHALATION) at 08:12

## 2024-12-05 RX ADMIN — GABAPENTIN 100 MG: 100 CAPSULE ORAL at 02:12

## 2024-12-05 RX ADMIN — GABAPENTIN 100 MG: 100 CAPSULE ORAL at 09:12

## 2024-12-05 RX ADMIN — HEPARIN SODIUM 5000 UNITS: 5000 INJECTION INTRAVENOUS; SUBCUTANEOUS at 02:12

## 2024-12-05 RX ADMIN — PIPERACILLIN AND TAZOBACTAM 4.5 G: 4; .5 INJECTION, POWDER, LYOPHILIZED, FOR SOLUTION INTRAVENOUS; PARENTERAL at 12:12

## 2024-12-05 RX ADMIN — ARFORMOTEROL TARTRATE 15 MCG: 15 SOLUTION RESPIRATORY (INHALATION) at 08:12

## 2024-12-05 RX ADMIN — HEPARIN SODIUM 5000 UNITS: 5000 INJECTION INTRAVENOUS; SUBCUTANEOUS at 05:12

## 2024-12-05 RX ADMIN — IPRATROPIUM BROMIDE AND ALBUTEROL SULFATE 3 ML: 2.5; .5 SOLUTION RESPIRATORY (INHALATION) at 08:12

## 2024-12-05 RX ADMIN — CEFEPIME 2 G: 2 INJECTION, POWDER, FOR SOLUTION INTRAVENOUS at 09:12

## 2024-12-05 RX ADMIN — HEPARIN SODIUM 5000 UNITS: 5000 INJECTION INTRAVENOUS; SUBCUTANEOUS at 09:12

## 2024-12-05 RX ADMIN — Medication 10 ML: at 10:12

## 2024-12-05 RX ADMIN — MUPIROCIN: 20 OINTMENT TOPICAL at 09:12

## 2024-12-05 RX ADMIN — PANTOPRAZOLE SODIUM 40 MG: 40 INJECTION, POWDER, FOR SOLUTION INTRAVENOUS at 09:12

## 2024-12-05 RX ADMIN — Medication 10 ML: at 05:12

## 2024-12-05 RX ADMIN — METHYLPREDNISOLONE SODIUM SUCCINATE 60 MG: 40 INJECTION, POWDER, FOR SOLUTION INTRAMUSCULAR; INTRAVENOUS at 05:12

## 2024-12-05 RX ADMIN — PIPERACILLIN AND TAZOBACTAM 4.5 G: 4; .5 INJECTION, POWDER, LYOPHILIZED, FOR SOLUTION INTRAVENOUS; PARENTERAL at 09:12

## 2024-12-05 RX ADMIN — ATORVASTATIN CALCIUM 40 MG: 40 TABLET, FILM COATED ORAL at 02:12

## 2024-12-05 RX ADMIN — SERTRALINE HYDROCHLORIDE 100 MG: 50 TABLET ORAL at 09:12

## 2024-12-05 RX ADMIN — IPRATROPIUM BROMIDE AND ALBUTEROL SULFATE 3 ML: 2.5; .5 SOLUTION RESPIRATORY (INHALATION) at 01:12

## 2024-12-05 RX ADMIN — LIDOCAINE 5% 1 PATCH: 700 PATCH TOPICAL at 09:12

## 2024-12-05 RX ADMIN — HYDROCODONE BITARTRATE AND ACETAMINOPHEN 1 TABLET: 10; 325 TABLET ORAL at 09:12

## 2024-12-05 RX ADMIN — HYDROCODONE BITARTRATE AND ACETAMINOPHEN 1 TABLET: 10; 325 TABLET ORAL at 05:12

## 2024-12-05 NOTE — SUBJECTIVE & OBJECTIVE
Review of Systems   All other systems reviewed and are negative.    Objective:     Vital Signs (Most Recent):  Temp: 98.4 °F (36.9 °C) (12/05/24 1505)  Pulse: 68 (12/05/24 1505)  Resp: 14 (12/05/24 1505)  BP: (!) 145/66 (12/05/24 1505)  SpO2: 96 % (12/05/24 1505) Vital Signs (24h Range):  Temp:  [97.5 °F (36.4 °C)-101.7 °F (38.7 °C)] 98.4 °F (36.9 °C)  Pulse:  [] 68  Resp:  [5-29] 14  SpO2:  [55 %-100 %] 96 %  BP: ()/(45-88) 145/66     Weight: 79 kg (174 lb 2.6 oz)  Body mass index is 30.85 kg/m².    Intake/Output Summary (Last 24 hours) at 12/5/2024 1601  Last data filed at 12/5/2024 1500  Gross per 24 hour   Intake 3274.23 ml   Output 976 ml   Net 2298.23 ml         Physical Exam  Vitals and nursing note reviewed.   Constitutional:       General: She is not in acute distress.     Appearance: Normal appearance. She is normal weight. She is ill-appearing.   Cardiovascular:      Rate and Rhythm: Normal rate and regular rhythm.      Heart sounds: No murmur heard.  Pulmonary:      Effort: Pulmonary effort is normal. No respiratory distress.      Breath sounds: No wheezing.      Comments: Wearing 2 L NC  Genitourinary:     Comments: Shelton in place  Neurological:      Mental Status: She is alert.             Significant Labs: All pertinent labs within the past 24 hours have been reviewed.  Recent Lab Results  (Last 5 results in the past 24 hours)        12/05/24  0318   12/04/24  2215   12/04/24  2147   12/04/24  2031   12/04/24 2028        Respiratory Infection Panel Source       NP swab         Adenovirus       Not Detected         Coronavirus 229E, Common Cold Virus       Not Detected         Coronavirus HKU1, Common Cold Virus       Not Detected         Coronavirus NL63, Common Cold Virus       Not Detected         Coronavirus OC43, Common Cold Virus       Not Detected  Comment: The Coronavirus strains detected in this test cause the common cold.  These strains are not the COVID-19 (novel  Coronavirus)strain   associated with the respiratory disease outbreak.           Human Metapneumovirus       Not Detected         Human Rhinovirus/Enterovirus       Not Detected         Influenza A H1-2009       Not Detected         Influenza B       Not Detected         Parainfluenza Virus 1       Not Detected         Parainfluenza Virus 2       Not Detected         Parainfluenza Virus 3       Not Detected         Parainfluenza Virus 4       Not Detected         Respiratory Syncytial Virus       Not Detected         Bordetella Parapertussis ([id number])       Not Detected         Bordetella pertussis (ptxP)       Not Detected         Chlamydia pneumoniae       Not Detected         Mycoplasma pneumoniae       Not Detected  Comment: Respiratory Infection Panel testing performed by Multiplex PCR.         Albumin 2.4                              ALT 14               Anion Gap 9               AST 16               Baso # 0.01               Basophil % 0.1               BILIRUBIN TOTAL 0.5  Comment: For infants and newborns, interpretation of results should be based  on gestational age, weight and in agreement with clinical  observations.    Premature Infant recommended reference ranges:  Up to 24 hours.............<8.0 mg/dL  Up to 48 hours............<12.0 mg/dL  3-5 days..................<15.0 mg/dL  6-29 days.................<15.0 mg/dL                 Blood Culture, Routine     No Growth to date  [P]                No Growth to date  [P]           BUN 48               Calcium 8.4               Chloride 107               CO2 17               Creatinine 1.3               Urine Creatinine         86.0                86.0       Differential Method Automated               eGFR 40               Eos # 0.0               Eos % 0.4               Glucose 174               Gran # (ANC) 9.2               Gran % 95.6               Hematocrit 32.3               Hemoglobin 10.2               Immature Grans (Abs) 0.05  Comment:  Mild elevation in immature granulocytes is non specific and   can be seen in a variety of conditions including stress response,   acute inflammation, trauma and pregnancy. Correlation with other   laboratory and clinical findings is essential.                 Immature Granulocytes 0.5               Lymph # 0.1               Lymph % 1.2               Magnesium  2.2               MCH 26.7               MCHC 31.6               MCV 85               MICROALB/CREAT RATIO         40.7       Urine Microalbumin         35.0       Mono # 0.2               Mono % 2.2               MPV 10.5               nRBC 0               QRS Duration   104             [facility] QTC Calculation   416             Phosphorus Level 4.3               Platelet Count 130               Potassium 4.8               PROTEIN TOTAL 5.6               RBC 3.82               RDW 15.2               SARS-CoV2 (COVID-19) Qualitative PCR       Not Detected         Sodium 133               Sodium, Urine         53  Comment: The random urine reference ranges provided were established   for 24 hour urine collections.  No reference ranges exist for  random urine specimens.  Correlate clinically.         WBC 9.64                                       [P] - Preliminary Result               Significant Imaging: I have reviewed all pertinent imaging results/findings within the past 24 hours.    X-Ray Chest 1 View   Final Result      Stable chest.         Electronically signed by: Boni Cr MD   Date:    12/04/2024   Time:    20:14      X-Ray Chest AP Portable   Final Result      Worsening left-sided airspace opacity in comparison to the prior radiograph.  Underlying mass not excluded.         Electronically signed by: Cam Obrien   Date:    12/03/2024   Time:    13:08      MRI Cervical Spine W WO Cont    (Results Pending)   MRI Lumbar Spine W WO Cont    (Results Pending)   MRI Thoracic Spine W WO Cont    (Results Pending)

## 2024-12-05 NOTE — ASSESSMENT & PLAN NOTE
- Patient has metastatic cancer of lung primary. The cancer has metastasized to liver, opposite lung. The patient is not under the care of an outpatient oncologist. The patient is not undergoing active chemotherapy. .Their staging information is listed below.   - CT scan from 12/1 shows worsening disease with liver mets; possible mets to bone but unclear  - History is bit unclear about diagnosis/treatment; received 2 targeted radiation sessions but no chemo; patient states that she would never want chemotherapy  - Has followed with [facility] but would like to transition care to [facility]  - Respiratory status is currently stable  - Appreciate oncology assistance

## 2024-12-05 NOTE — ASSESSMENT & PLAN NOTE
As seen on UA 12/1- asymptomatic, no obvious dysuria sec to severe LBP  Will Treat with IVF and IV Rocephin  Urine Cx from 12/1- Neg    12/4/24  Ceftriaxone day # 2  Shelton placed due to urinary retention

## 2024-12-05 NOTE — ASSESSMENT & PLAN NOTE
Advance Care Planning     Date: 12/05/2024  [facility]  I engaged the family in a voluntary conversation about advance care planning and we specifically addressed what the goals of care would be moving forward, in light of the patient's change in clinical status, specifically respiratory failure.  We did specifically address the patient's likely prognosis, which is poor.  We explored the patient's values and preferences for future care.  The family endorses that what is most important right now is to focus on avoiding the hospital, symptom/pain control, improvement in condition but with limits to invasive therapies, and comfort and QOL     Accordingly, we have decided that the best plan to meet the patient's goals includes continuing with treatment, but no resuscitation or mechanical ventilation. Family will likely seek information regarding palliative care in am.     Code Status  In light of the patients advanced and life limiting illness,I engaged the the family in a voluntary conversation about the patient's preferences for care  at the very end of life. The patient wishes to have a natural, peaceful death.  Along those lines, the patient does not wish to have CPR or other invasive treatments performed when her heart and/or breathing stops. I communicated to the family that a DNR order would be placed in her medical record to reflect this preference.    A total of 39 min was spent on advance care planning, goals of care discussion, emotional support, formulating and communicating prognosis and exploring burden/benefit of various approaches of treatment. This discussion occurred on a fully voluntary basis with the verbal consent of the patient and/or family.

## 2024-12-05 NOTE — CONSULTS
[facility] - Intensive Care ([facility])  Critical Care Medicine  Consult Note    Patient Name: Katrina Duran  MRN: [id number]  Admission Date: 12/3/2024  Hospital Length of Stay: 1 days  Code Status: DNR  Attending Physician: Candelaria Harrell MD   Primary Care Provider: Liliya Freitas DO   Principal Problem: Acute hypoxemic respiratory failure    Inpatient consult to Critical Care Medicine  Consult performed by: Jesusita Peters NP  Consult ordered by: Meldia Chaudhari NP        Subjective:     HPI:  Katrina Duran is an 84-year-old female with a PMHx of COPD, HTN, AAA w/stent graft, lung cancer (not currently on treatment, s/p radiation), CAD s/p CABG & PCI w/stent, who was admitted by hospital medicine for severe lowe back pain with difficulty ambulating. Patient was admitted by hospital medicine for acute intractable lumbar pain sec to L3 vertebral fracture and metastatic lung cancer with B lung mets as well as liver mets. Pt also has UTI, NICKI and Dehydration. Elevated BNP likely sec to NICKI. Pt started on IV rocephin, NS, Norco and IV MS for pain control. Will consult XRT for possible L/S Palliative Radiation. NSGY was also contacted and did not consider it a neurosurgical case.      Rapid response called tonight due to increased lethargy, unable to  SpO2 reliably, and was noted to be low-77%  when reading obtained. Patient noted to be favoring lying on left side, which is where lung mass w/collapse is noted. She also had an episode of vomiting tonight w/possible aspiration. ABG obtained, patient turned off of left side with improvement in mental status and SpO2. CXR with no interval change. Chart review shows patient has had norco, morphine, dilaudid, and flexeril today for severe pain. BNP noted to be elevated at 1800. IV fluids discontinued. She also was noted to have fever. She has been on Rocephin. CC consulted and patient transferred to ICU for closer monitoring.          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    COPD (chronic obstructive pulmonary disease)     Coronary artery disease     Essential (primary) hypertension 12/15/2005    Heart attack         Mass of lower lobe of right lung 2016    Simple chronic bronchitis 2016       Past Surgical History:   Procedure Laterality Date    APPENDECTOMY      BACK SURGERY      CORONARY ANGIOPLASTY WITH STENT PLACEMENT      HYSTERECTOMY      OTHER SURGICAL HISTORY      Abdominal Aneurysm    TONSILLECTOMY         Review of patient's allergies indicates:   Allergen Reactions    Codeine Nausea Only    Adhesive Itching and Blisters     tape  tape    Latex, natural rubber Itching       Family History       Problem Relation (Age of Onset)    Cancer Sister          Tobacco Use    Smoking status: Former     Current packs/day: 0.00     Types: Cigarettes     Quit date: 1999     Years since quittin.9    Smokeless tobacco: Never   Substance and Sexual Activity    Alcohol use: No    Drug use: No    Sexual activity: Never         Review of Systems   Constitutional:  Positive for fever.   HENT: Negative.     Eyes: Negative.    Respiratory:  Positive for cough and shortness of breath.    Cardiovascular: Negative.    Gastrointestinal: Negative.    Endocrine: Negative.    Genitourinary:  Positive for difficulty urinating.   Musculoskeletal:  Positive for back pain.   Skin: Negative.    Allergic/Immunologic: Negative.    Neurological: Negative.    Hematological:  Bruises/bleeds easily.   Psychiatric/Behavioral: Negative.       Objective:     Vital Signs (Most Recent):  Temp: 97.8 °F (36.6 °C) (24)  Pulse: 102 (24)  Resp: (!) 29 (24)  BP: 135/88 (24)  SpO2: 99 % (24) Vital Signs (24h  Range):  Temp:  [97.8 °F (36.6 °C)-98.7 °F (37.1 °C)] 97.8 °F (36.6 °C)  Pulse:  [] 102  Resp:  [18-29] 29  SpO2:  [55 %-99 %] 99 %  BP: ()/(45-96) 135/88     Weight: 77 kg (169 lb 12.1 oz)  Body mass index is 30.07 kg/m².      Intake/Output Summary (Last 24 hours) at 12/4/2024 2017  Last data filed at 12/4/2024 1942  Gross per 24 hour   Intake 2367.72 ml   Output 1164 ml   Net 1203.72 ml        Physical Exam  Vitals and nursing note reviewed.   Constitutional:       Appearance: She is overweight. She is ill-appearing.      Interventions: Nasal cannula in place.   HENT:      Head: Normocephalic and atraumatic.      Nose: Nose normal.      Mouth/Throat:      Mouth: Mucous membranes are moist.      Pharynx: Oropharynx is clear.   Eyes:      Extraocular Movements: Extraocular movements intact.      Conjunctiva/sclera: Conjunctivae normal.      Pupils: Pupils are equal, round, and reactive to light.   Cardiovascular:      Rate and Rhythm: Regular rhythm. Tachycardia present.      Pulses: Normal pulses.      Heart sounds: Normal heart sounds.   Pulmonary:      Effort: Tachypnea and respiratory distress present.      Breath sounds: Examination of the left-upper field reveals rhonchi. Examination of the left-middle field reveals decreased breath sounds. Examination of the left-lower field reveals decreased breath sounds. Decreased breath sounds, wheezing and rhonchi present.   Abdominal:      General: Bowel sounds are normal.      Palpations: Abdomen is soft.      Tenderness: There is no abdominal tenderness. There is no guarding.   Musculoskeletal:      Cervical back: Normal range of motion and neck supple.      Right lower leg: No edema.      Left lower leg: No edema.   Skin:     General: Skin is warm.      Capillary Refill: Capillary refill takes less than 2 seconds.   Neurological:      Mental Status: She is easily aroused.      GCS: GCS eye subscore is 4. GCS verbal subscore is 5. GCS motor subscore is 6.       Cranial Nerves: Cranial nerves 2-12 are intact.   Psychiatric:         Behavior: Behavior is cooperative.          Vents:       Lines/Drains/Airways       Drain  Duration                  Urethral Catheter 12/04/24 2012 Temperature probe 16 Fr. <1 day              Peripheral Intravenous Line  Duration                  Peripheral IV - Single Lumen 12/04/24 2016 20 G Anterior;Left;Proximal Forearm <1 day                  Significant Labs:    CBC/Anemia Profile:  Recent Labs   Lab 12/03/24  1225 12/04/24  0515 12/04/24 1944   WBC 12.39 10.70  --    HGB 11.1* 10.7*  --    HCT 34.8* 35.2* 32*   * 143*  --    MCV 83 87  --    RDW 15.2* 15.2*  --       Chemistries:  Recent Labs   Lab 12/03/24  1225 12/04/24  0515   * 132*   K 4.4 4.4    103   CO2 17* 20*   BUN 43* 47*   CREATININE 1.5* 1.5*   CALCIUM 9.1 8.7   ALBUMIN 3.0* 2.5*   PROT 6.5 5.8*   BILITOT 0.9 0.6   ALKPHOS 118 107   ALT 11 9*   AST 23 16   MG  --  2.2   PHOS  --  4.1     Coagulation:   Recent Labs   Lab 12/03/24  1225   INR 1.1   APTT 22.6     Lactic Acid:   Recent Labs   Lab 12/03/24  1802 12/03/24 2132 12/04/24 2026   LACTATE 1.5 1.8 2.2   ABG  Recent Labs   Lab 12/04/24 1944   PH 7.237*   PO2 212*   PCO2 41.5   HCO3 17.7*   BE -10*     Urine Studies:   Recent Labs   Lab 12/04/24  0618   COLORU Yellow   APPEARANCEUA Clear   PHUR 6.0   SPECGRAV >1.030*   PROTEINUA 1+*   GLUCUA Negative   KETONESU Negative   BILIRUBINUA Negative   OCCULTUA Negative   NITRITE Negative   UROBILINOGEN 2.0-3.0*   LEUKOCYTESUR Negative   RBCUA 0   WBCUA 2   BACTERIA None   HYALINECASTS 1     All pertinent labs within the past 24 hours have been reviewed.    Significant Imaging:   I have reviewed all pertinent imaging results/findings within the past 24 hours.    ABG  Recent Labs   Lab 12/04/24 1944   PH 7.237*   PO2 212*   PCO2 41.5   HCO3 17.7*   BE -10*     Assessment/Plan:     Neuro  Closed fracture of third lumbar vertebra  - multimodal pain  mgmt  - PT/OT  - NSGY consulted by  w/no surgical intervention recommended  - awaiting records from OSH d/t implant hx  - MRI pending to eval for metastatic disease    Pulmonary  * Acute hypoxemic respiratory failure  Patient with Hypoxic Respiratory failure which is Acute.  she is not on home oxygen. Supplemental oxygen was provided and noted-      Signs/symptoms of respiratory failure include- tachypnea, increased work of breathing, respiratory distress, wheezing, and lethargy. Contributing diagnoses includes - Aspiration, COPD, and Pneumonia Labs and images were reviewed. Patient Has recent ABG, which has been reviewed. Will treat underlying causes and adjust management of respiratory failure as follows-     - improvement with position change and supplemental oxygen  - optimize w/positioning  - wean O2 to SpO2>88%  - continue tx of COPD  - possible aspiration event during vomiting, ?post-obstructive pna  - aspiration precautions, SLP eval  - broaden abx, sputum cx, MRSA cx ordered  - de-escalate as soon as able      Renal/  NICKI (acute kidney injury)  NICKI is likely due to post-obstructive d/t urinary retention . Baseline creatinine is  1.1 . Most recent creatinine and eGFR are listed below.  Recent Labs     12/03/24  1225 12/04/24  0515 12/04/24 2026   CREATININE 1.5* 1.5* 1.3   EGFRNORACEVR 34* 34* 40*      Plan  - NICKI is improving  - Avoid nephrotoxins and renally dose meds for GFR listed above  - Monitor urine output, serial BMP, and adjust therapy as needed  - d/c IVF, s/p furosemide  - mayen placed w/good UOP    UTI (urinary tract infection)  - mayen placed d/t retention  - broadened abx  - follow cultures, de-escalate as soon as able      Oncology  Metastatic lung cancer (metastasis from lung to other site)  Patient has metastatic cancer of lung primary. The cancer has metastasized to liver, opposite lung. The patient is not under the care of an outpatient oncologist. The patient is not undergoing  active chemotherapy. .Their staging information is listed below.   Cancer Staging   No matching staging information was found for the patient.  - oncology consulted, following  - multimodal pain management        Palliative Care  Advanced care planning/counseling discussion  Advance Care Planning    Date: 12/05/2024  [facility]  I engaged the family in a voluntary conversation about advance care planning and we specifically addressed what the goals of care would be moving forward, in light of the patient's change in clinical status, specifically respiratory failure.  We did specifically address the patient's likely prognosis, which is poor.  We explored the patient's values and preferences for future care.  The family endorses that what is most important right now is to focus on avoiding the hospital, symptom/pain control, improvement in condition but with limits to invasive therapies, and comfort and QOL     Accordingly, we have decided that the best plan to meet the patient's goals includes continuing with treatment, but no resuscitation or mechanical ventilation. Family will likely seek information regarding palliative care in am.     Code Status  In light of the patients advanced and life limiting illness,I engaged the the family in a voluntary conversation about the patient's preferences for care  at the very end of life. The patient wishes to have a natural, peaceful death.  Along those lines, the patient does not wish to have CPR or other invasive treatments performed when her heart and/or breathing stops. I communicated to the family that a DNR order would be placed in her medical record to reflect this preference.    A total of 39 min was spent on advance care planning, goals of care discussion, emotional support, formulating and communicating prognosis and exploring burden/benefit of various approaches of treatment. This discussion occurred on a fully voluntary basis with the verbal consent of the patient and/or  family.              Critical Care Daily Checklist:    A: Awake: RASS Goal/Actual Goal:    Actual:     B: Spontaneous Breathing Trial Performed?     C: SAT & SBT Coordinated?  N/a                      D: Delirium: CAM-ICU     E: Early Mobility Performed? Yes   F: Feeding Goal: Goals: 1. Pt will tolerate and consume >75% EEN and EPN prior to RD follow up 2. Pt's nausea will be resolved prior to RD follow up 3. Pt will have a BM prior to RD follow up  Status: Nutrition Goal Status: new   Current Diet Order   Procedures    Diet NPO      AS: Analgesia/Sedation multimodal   T: Thromboembolic Prophylaxis heparin   H: HOB > 300 Yes   U: Stress Ulcer Prophylaxis (if needed) protonix   G: Glucose Control monitor   B: Bowel Function     I: Indwelling Catheter (Lines & Shelton) Necessity reviewed   D: De-escalation of Antimicrobials/Pharmacotherapies reviewed    Plan for the day/ETD Transfer to ICU    Code Status:  Family/Goals of Care: DNR  TBD     Patient has done well since transfer to ICU. May be able to transfer back out to floor tomorrow.     Critical Care Time: 41 minutes  Critical secondary to high risk monitoring. Patient has a condition that poses threat to life and bodily function.      Critical care was time spent personally by me on the following activities: development of treatment plan with patient or surrogate and bedside caregivers, discussions with consultants, evaluation of patient's response to treatment, examination of patient, ordering and performing treatments and interventions, ordering and review of laboratory studies, ordering and review of radiographic studies, pulse oximetry, re-evaluation of patient's condition. This critical care time did not overlap with that of any other provider or involve time for any procedures.    Thank you for your consult. I will follow-up with patient. Please contact us if you have any additional questions.     Jesusita Peters, NP  Critical Care Medicine  [facility] -  Intensive Care ([facility])

## 2024-12-05 NOTE — SUBJECTIVE & OBJECTIVE
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    COPD (chronic obstructive pulmonary disease)     Coronary artery disease     Essential (primary) hypertension 12/15/2005    Heart attack     1999    Mass of lower lobe of right lung 2016    Simple chronic bronchitis 2016       Past Surgical History:   Procedure Laterality Date    APPENDECTOMY      BACK SURGERY      CORONARY ANGIOPLASTY WITH STENT PLACEMENT      HYSTERECTOMY      OTHER SURGICAL HISTORY      Abdominal Aneurysm    TONSILLECTOMY         Review of patient's allergies indicates:   Allergen Reactions    Codeine Nausea Only    Adhesive Itching and Blisters     tape  tape    Latex, natural rubber Itching       Family History       Problem Relation (Age of Onset)    Cancer Sister          Tobacco Use    Smoking status: Former     Current packs/day: 0.00     Types: Cigarettes     Quit date: 1999     Years since quittin.9    Smokeless tobacco: Never   Substance and Sexual Activity    Alcohol use: No    Drug use: No    Sexual activity: Never         Review of Systems   Constitutional:  Positive for fever.   HENT: Negative.     Eyes: Negative.    Respiratory:  Positive for cough and shortness of breath.    Cardiovascular: Negative.    Gastrointestinal: Negative.    Endocrine: Negative.    Genitourinary:  Positive for difficulty urinating.   Musculoskeletal:  Positive for back pain.   Skin: Negative.    Allergic/Immunologic: Negative.    Neurological: Negative.    Hematological:  Bruises/bleeds easily.   Psychiatric/Behavioral: Negative.       Objective:     Vital Signs (Most Recent):  Temp: 97.8 °F (36.6 °C) (24)  Pulse: 102 (24)  Resp: (!) 29 (24)  BP: 135/88 (24)  SpO2: 99 % (24) Vital Signs (24h Range):  Temp:   [97.8 °F (36.6 °C)-98.7 °F (37.1 °C)] 97.8 °F (36.6 °C)  Pulse:  [] 102  Resp:  [18-29] 29  SpO2:  [55 %-99 %] 99 %  BP: ()/(45-96) 135/88     Weight: 77 kg (169 lb 12.1 oz)  Body mass index is 30.07 kg/m².      Intake/Output Summary (Last 24 hours) at 12/4/2024 2017  Last data filed at 12/4/2024 1942  Gross per 24 hour   Intake 2367.72 ml   Output 1164 ml   Net 1203.72 ml        Physical Exam  Vitals and nursing note reviewed.   Constitutional:       Appearance: She is overweight. She is ill-appearing.      Interventions: Nasal cannula in place.   HENT:      Head: Normocephalic and atraumatic.      Nose: Nose normal.      Mouth/Throat:      Mouth: Mucous membranes are moist.      Pharynx: Oropharynx is clear.   Eyes:      Extraocular Movements: Extraocular movements intact.      Conjunctiva/sclera: Conjunctivae normal.      Pupils: Pupils are equal, round, and reactive to light.   Cardiovascular:      Rate and Rhythm: Regular rhythm. Tachycardia present.      Pulses: Normal pulses.      Heart sounds: Normal heart sounds.   Pulmonary:      Effort: Tachypnea and respiratory distress present.      Breath sounds: Examination of the left-upper field reveals rhonchi. Examination of the left-middle field reveals decreased breath sounds. Examination of the left-lower field reveals decreased breath sounds. Decreased breath sounds, wheezing and rhonchi present.   Abdominal:      General: Bowel sounds are normal.      Palpations: Abdomen is soft.      Tenderness: There is no abdominal tenderness. There is no guarding.   Musculoskeletal:      Cervical back: Normal range of motion and neck supple.      Right lower leg: No edema.      Left lower leg: No edema.   Skin:     General: Skin is warm.      Capillary Refill: Capillary refill takes less than 2 seconds.   Neurological:      Mental Status: She is easily aroused.      GCS: GCS eye subscore is 4. GCS verbal subscore is 5. GCS motor subscore is 6.      Cranial  Nerves: Cranial nerves 2-12 are intact.   Psychiatric:         Behavior: Behavior is cooperative.          Vents:       Lines/Drains/Airways       Drain  Duration                  Urethral Catheter 12/04/24 2012 Temperature probe 16 Fr. <1 day              Peripheral Intravenous Line  Duration                  Peripheral IV - Single Lumen 12/04/24 2016 20 G Anterior;Left;Proximal Forearm <1 day                  Significant Labs:    CBC/Anemia Profile:  Recent Labs   Lab 12/03/24  1225 12/04/24  0515 12/04/24 1944   WBC 12.39 10.70  --    HGB 11.1* 10.7*  --    HCT 34.8* 35.2* 32*   * 143*  --    MCV 83 87  --    RDW 15.2* 15.2*  --       Chemistries:  Recent Labs   Lab 12/03/24  1225 12/04/24  0515   * 132*   K 4.4 4.4    103   CO2 17* 20*   BUN 43* 47*   CREATININE 1.5* 1.5*   CALCIUM 9.1 8.7   ALBUMIN 3.0* 2.5*   PROT 6.5 5.8*   BILITOT 0.9 0.6   ALKPHOS 118 107   ALT 11 9*   AST 23 16   MG  --  2.2   PHOS  --  4.1     Coagulation:   Recent Labs   Lab 12/03/24  1225   INR 1.1   APTT 22.6     Lactic Acid:   Recent Labs   Lab 12/03/24  1802 12/03/24  2132 12/04/24  2026   LACTATE 1.5 1.8 2.2   ABG  Recent Labs   Lab 12/04/24 1944   PH 7.237*   PO2 212*   PCO2 41.5   HCO3 17.7*   BE -10*     Urine Studies:   Recent Labs   Lab 12/04/24  0618   COLORU Yellow   APPEARANCEUA Clear   PHUR 6.0   SPECGRAV >1.030*   PROTEINUA 1+*   GLUCUA Negative   KETONESU Negative   BILIRUBINUA Negative   OCCULTUA Negative   NITRITE Negative   UROBILINOGEN 2.0-3.0*   LEUKOCYTESUR Negative   RBCUA 0   WBCUA 2   BACTERIA None   HYALINECASTS 1     All pertinent labs within the past 24 hours have been reviewed.    Significant Imaging:   I have reviewed all pertinent imaging results/findings within the past 24 hours.

## 2024-12-05 NOTE — ASSESSMENT & PLAN NOTE
Patient has a diagnosis of pneumonia. The cause of the pneumonia is suspected to be bacterial in etiology but organism is not known. The pneumonia is worsening due to O2 requirement . The patient has the following signs/symptoms of pneumonia: persistent hypoxia . The patient does have a current oxygen requirement and the patient does not have a home oxygen requirement. I have reviewed the pertinent imaging. The following cultures have been collected: Blood cultures The culture results are listed below.     Current antimicrobial regimen consists of the antibiotics listed below. Will monitor patient closely and continue current treatment plan unchanged.    Antibiotics (From admission, onward)      Start     Stop Route Frequency Ordered    12/05/24 1230  piperacillin-tazobactam (ZOSYN) 4.5 g in D5W 100 mL IVPB (MB+)         -- IV Every 8 hours (non-standard times) 12/05/24 0939    12/05/24 1045  mupirocin 2 % ointment         12/10/24 0859 Nasl 2 times daily 12/05/24 0941            Microbiology Results (last 7 days)       Procedure Component Value Units Date/Time    Blood culture [[id number]] Collected: 12/04/24 2147    Order Status: Completed Specimen: Blood Updated: 12/05/24 0915     Blood Culture, Routine No Growth to date    Narrative:      #2    Blood culture [[id number]] Collected: 12/04/24 2147    Order Status: Completed Specimen: Blood Updated: 12/05/24 0915     Blood Culture, Routine No Growth to date    Culture, MRSA [[id number]] Collected: 12/04/24 2029    Order Status: Sent Specimen: MRSA source from Nares, Left Updated: 12/05/24 0137    Respiratory Infection Panel (PCR), Nasopharyngeal [[id number]] Collected: 12/04/24 2031    Order Status: Completed Specimen: Nasopharyngeal Swab Updated: 12/05/24 0014     Respiratory Infection Panel Source NP swab     Adenovirus Not Detected     Coronavirus 229E, Common Cold Virus Not Detected     Coronavirus HKU1, Common Cold Virus Not Detected     Coronavirus NL63,  Common Cold Virus Not Detected     Coronavirus OC43, Common Cold Virus Not Detected     Comment: The Coronavirus strains detected in this test cause the common cold.  These strains are not the COVID-19 (novel Coronavirus)strain   associated with the respiratory disease outbreak.          SARS-CoV2 (COVID-19) Qualitative PCR Not Detected     Human Metapneumovirus Not Detected     Human Rhinovirus/Enterovirus Not Detected     Influenza A (subtypes H1, H1-2009,H3) Not Detected     Influenza B Not Detected     Parainfluenza Virus 1 Not Detected     Parainfluenza Virus 2 Not Detected     Parainfluenza Virus 3 Not Detected     Parainfluenza Virus 4 Not Detected     Respiratory Syncytial Virus Not Detected     Bordetella Parapertussis (GN2395) Not Detected     Bordetella pertussis (ptxP) Not Detected     Chlamydia pneumoniae Not Detected     Mycoplasma pneumoniae Not Detected     Comment: Respiratory Infection Panel testing performed by Multiplex PCR.       Narrative:      Assay not valid for lower respiratory specimens, alternate  testing required.    Culture, Respiratory with Gram Stain [[id number]] Collected: 12/04/24 2029    Order Status: Sent Specimen: Respiratory from Endotracheal Aspirate Updated: 12/04/24 2029

## 2024-12-05 NOTE — PT/OT/SLP EVAL
Speech Language Pathology Evaluation  Cognitive/Bedside Swallow    Patient Name:  Katrina Duran   MRN:  [id number]  Admitting Diagnosis: Acute hypoxemic respiratory failure    Recommendations:                  General Recommendations:   Diet follow-up and Ongoing GOC/ACP  Diet recommendations:  Soft & Bite Sized Diet - IDDSI Level 6, Thin liquids - IDDSI Level 0   Aspiration Precautions: 1 bite/sip at a time, Avoid talking while eating, Frequent oral care, HOB to 90 degrees, Meds whole buried in puree if needed, Small bites/sips, and Standard aspiration precautions   General Precautions: Standard, aspiration  Communication strategies:  none    Assessment:     Katrina Duran is a 84 y.o. female with a PMHx significant for COPD, Lung Ca and CAD s/p CABG in 1999 and stent in 2006, brought to ER by EMS for severe lower back. Pt seen bedside for ST de dios. She is appreciated with functional OM and cognitive-linguistic ability to meet functional communicative needs. Pt and family endorse intermittent pocketing of food and then orally excreting it; although not appreciated during evaluation. Pt reports this is because she has had decreased appetite and endorses food does not taste the same to her. No overt s/s of dysphagia present during bedside CSE, and pt recommended for IDDSI 6-soft and bite sized solids with IDDSI 0-thin liquids, following standard aspiration precautions. Pt may benefit from dietitian consultation given reported decreased appetite and weight loss as well as ongoing GOC/ACP discussion given metastatic Ca. ST will f/u to assess pt diet tolerance.     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    Prior Intubation HX:  NA    Modified Barium Swallow: NA    XR CHEST 1 VIEW 12/04/2024:    FINDINGS:  Prior median sternotomy.  Moderate cardiomegaly.  Stable increased density involving the along the left heart border.  Right lung remains essentially clear.     Impression:  Stable chest.     Electronically signed by:Boni Cr MD  Date:                                            12/04/2024  Time:                                           20:14    XR CHEST AP PORTABLE on 12/03/2024:    FINDINGS:  Suspect worsening left-sided pulmonary opacity in comparison to the prior radiograph.  Underlying mass not excluded.  No pleural fluid or pneumothorax.     The cardiac silhouette is enlarged.  The hilar and mediastinal contours are unremarkable.     Bones are intact.     Impression:  Worsening left-sided airspace opacity in comparison to the prior radiograph.  Underlying mass not excluded.     Electronically signed by:Cam Obrien  Date:                                            12/03/2024  Time:                                           13:08    Prior diet: Regular.    Occupation/hobbies/homemaking: Pt reports prior to admission she was fully independent w ADLs, medication/meal/financial management.    Subjective     Pt seen bedside for ST eval, family initially present at bedside  Patient goals: to eat and drink what she wants     Objective:     Cognitive Status:    Pt oriented with functional memory recall and reasoning.     Receptive Language:   Comprehension:   WFL during conversation    Pragmatics:    WFL    Expressive  Language:  Verbal:    WFL during conversation    Motor Speech:  WFL    Voice:   WFL    Oral Musculature Evaluation  Oral Musculature: WFL  Dentition: upper dentures  Secretion Management: adequate  Mucosal Quality: good  Mandibular Strength and Mobility: WFL  Oral Labial Strength and Mobility: WFL  Lingual Strength and Mobility: WFL  Velar Elevation: WFL  Buccal Strength and Mobility: WFL  Volitional Cough: appreciated  Volitional Swallow: present  Voice Prior to PO Intake: clear    Bedside Swallow Eval:     Clinical Swallow Examination:   Of note, patient self-fed throughout evaluation. Patient presented with:     CONSISTENCY  NOTES   THIN (IDDSI 0) Cup/straw sips   No overt s/s of aspiration appreciated    PUREE (IDDSI 4/Extremely Thick)   Two containers of pudding No overt s/s of aspiration appreciated    SOLID (IDDSI 7/Regular) Bite of Jayla Doone cookie   No overt s/s of aspiration appreciated. Prolonged mastication.        Thickened liquids were not used in this assessment. Sadiqfe (2018) reported that thickened liquids have no sound evidence at reducing the risk of pneumonia in patients with dysphagia and can cause harm by increasing their risk of dehydration. It also presents an increased risk of UTI, electrolyte imbalance, constipation, fecal impaction, cognitive impairment, functional decline and even death (Langmore, 2002; [place], 2016).  Thickened liquids are associated with risks including dehydration, increased pharyngeal residue, potential interference with medication absorption, and decreased quality of life (Catia, 2013). Thickened liquids are also more likely to be silently aspirated than thin liquids (Vince et al., 2018). This supports the assertion that we should confirm a patient requires thickened liquids with an instrumental swallow study prior to recommending them.    References:   Catia ZENDEJAS (2013). Thickening agents used for dysphagia management: Effect on bioavailability of water,  medication and feelings of satiety. Nutrition Journal, 12, 54. https://doi.org/10.1186/5787-7124-82-54    AASHISH Clarke, JULIANN Dickson, CHRIS Sparks, & AASHISH Henao (2018). Cough response to aspiration in thin and thick fluids during FEES in hospitalized inpatients. International journal of language & communication disorders, 53(5), 909-918. https://doi.org/10.1111/5589-4205.21067    INTERPRETATION AND RISK ASSESSMENT:  Clinical swallow evaluation (CSE) revealed oral phase characterized by lingual, labial, and buccal strength and range of motion functional for lip closure, bolus preparation and propulsion. The patient had no anterior loss of the bolus with complete closure of the lips around the utensils. No residue remained in the oral cavity following the swallow. Patient without overt clinical signs/symptoms of aspiration on any PO trials given.      Compensatory Strategies  Standard aspiration precautions    Goals:   Multidisciplinary Problems       SLP Goals          Problem: SLP    Goal Priority Disciplines Outcome   SLP Goal     SLP    Description: TPW safely consume least restricitve PO diet w/o incident                       Plan:     Patient to be seen:  2 x/week, 3 x/week   Plan of Care expires:  12/12/24  Plan of Care reviewed with:  patient, family   SLP Follow-Up:  Yes       Discharge recommendations:  Therapy Intensity Recommendations at Discharge:  (pending pt acute progress)   Barriers to Discharge:  None    Time Tracking:     SLP Treatment Date:   12/05/24  Speech Start Time:  1200  Speech Stop Time:  1230     Speech Total Time (min):  30 min    Billable Minutes: Eval 15  and Eval Swallow and Oral Function 15    Marry Mcpherson MA, PL-SLP, CF-SLP  Speech Language Pathologist  12/05/2024

## 2024-12-05 NOTE — ASSESSMENT & PLAN NOTE
- Patient with Hypoxic Respiratory failure which is Acute.  she is not on home oxygen. Supplemental oxygen was provided and noted- Oxygen Concentration (%):  [28] 28  - Signs/symptoms of respiratory failure include- tachypnea, increased work of breathing, respiratory distress, wheezing, and lethargy. Contributing diagnoses includes - Aspiration, COPD, and Pneumonia Labs and images were reviewed. Patient Has recent ABG, which has been reviewed.   - Oversedation vs aspiration?  - Oxygenation has improved, have weaned down to RA; comfortable in bed  - Continue scheduled nebs  - IV steroids have been d/c'ed as patient has shown improvement  - Abx broadened after events overnight; will de-escalate down to zosyn

## 2024-12-05 NOTE — PROGRESS NOTES
Pharmacokinetic Initial Assessment: IV Vancomycin    Assessment/Plan:    Initiate intravenous vancomycin with loading dose of 1500 mg once with subsequent doses when random concentrations are less than 20 mcg/mL  Desired empiric serum trough concentration is 15 to 20 mcg/mL  Draw vancomycin random level on 12/05 at 1000.  Pharmacy will continue to follow and monitor vancomycin.      Please contact pharmacy at extension [phone] with any questions regarding this assessment.     Thank you for the consult,   Cris Graysonang       Patient brief summary:  Katrina Duran is a 84 y.o. female initiated on antimicrobial therapy with IV Vancomycin for treatment of suspected lower respiratory infection    Drug Allergies:   Review of patient's allergies indicates:   Allergen Reactions    Codeine Nausea Only    Adhesive Itching and Blisters     tape  tape    Latex, natural rubber Itching       Actual Body Weight:   77 kg    Renal Function:   Estimated Creatinine Clearance: 31.1 mL/min (based on SCr of 1.3 mg/dL).,     Dialysis Method (if applicable):  N/A    CBC (last 72 hours):  Recent Labs   Lab Result Units 12/03/24  1225 12/04/24  0515 12/04/24 2026   WBC K/uL 12.39 10.70 11.09   Hemoglobin g/dL 11.1* 10.7* 10.7*   Hematocrit % 34.8* 35.2* 34.3*   Platelets K/uL 130* 143* 139*   Gran % % 91.9* 89.4* 92.7*   Lymph % % 1.4* 2.6* 1.9*   Mono % % 3.9* 6.7 4.1   Eosinophil % % 2.0 0.3 0.4   Basophil % % 0.2 0.3 0.3   Differential Method  Automated Automated Automated       Metabolic Panel (last 72 hours):  Recent Labs   Lab Result Units 12/03/24  1225 12/04/24  0515 12/04/24  0618 12/04/24 2026 12/04/24 2028   Sodium mmol/L 134* 132*  --  130*  --    Sodium, Urine mmol/L  --   --   --   --  53   Potassium mmol/L 4.4 4.4  --  4.8  --    Chloride mmol/L 104 103  --  104  --    CO2 mmol/L 17* 20*  --  14*  --    Glucose mg/dL 142* 145*  --  171*  --    Glucose, UA   --   --  Negative  --   --    BUN mg/dL 43* 47*  --  47*  --   "  Creatinine mg/dL 1.5* 1.5*  --  1.3  --    Albumin g/dL 3.0* 2.5*  --  2.6*  --    Total Bilirubin mg/dL 0.9 0.6  --  0.6  --    Alkaline Phosphatase U/L 118 107  --  110  --    AST U/L 23 16  --  21  --    ALT U/L 11 9*  --  14  --    Magnesium mg/dL  --  2.2  --  2.1  --    Phosphorus mg/dL  --  4.1  --   --   --        Drug levels (last 3 results):  No results for input(s): "VANCOMYCINRA", "VANCORANDOM", "VANCOMYCINPE", "VANCOPEAK", "VANCOMYCINTR", "VANCOTROUGH" in the last 72 hours.    Microbiologic Results:  Microbiology Results (last 7 days)       Procedure Component Value Units Date/Time    Culture, MRSA [[id number]] Collected: 12/04/24 2029    Order Status: Sent Specimen: MRSA source from Nares, Left Updated: 12/04/24 2206    Respiratory Infection Panel (PCR), Nasopharyngeal [[id number]] Collected: 12/04/24 2031    Order Status: Completed Specimen: Nasopharyngeal Swab Updated: 12/04/24 2204     Respiratory Infection Panel Source NP swab    Narrative:      Assay not valid for lower respiratory specimens, alternate  testing required.    Blood culture [[id number]] Collected: 12/04/24 2147    Order Status: Sent Specimen: Blood     Blood culture [[id number]] Collected: 12/04/24 2147    Order Status: Sent Specimen: Blood     Culture, Respiratory with Gram Stain [[id number]] Collected: 12/04/24 2029    Order Status: Sent Specimen: Respiratory from Endotracheal Aspirate Updated: 12/04/24 2029            "

## 2024-12-05 NOTE — ASSESSMENT & PLAN NOTE
Multimodal pain control  PT/OT  NSGY and Oncology consult  Prognosis poor on account of underlying Lung Ca    12/4/24  MRI spine ordered to evaluate for metastatic disease  Unable to obtain due to concerns for MRI safety  Medical records pending, patient reports undergoing MRI previously    12/5/24  MRI lumbar spine was done on Oct 4th 2024 at [facility] in [place], [place] - phone number [phone].   There office will send the report to fax number in icu - [phone].  Updated MRI/Radiology staff via secure chat.

## 2024-12-05 NOTE — ASSESSMENT & PLAN NOTE
Patient with Hypoxic Respiratory failure which is Acute.  she is not on home oxygen. Supplemental oxygen was provided and noted-      Signs/symptoms of respiratory failure include- tachypnea, increased work of breathing, respiratory distress, wheezing, and lethargy. Contributing diagnoses includes - Aspiration, COPD, and Pneumonia Labs and images were reviewed. Patient Has recent ABG, which has been reviewed. Will treat underlying causes and adjust management of respiratory failure as follows-     - improvement with position change and supplemental oxygen  - optimize w/positioning  - wean O2 to SpO2>88%  - continue tx of COPD  - possible aspiration event during vomiting, ?post-obstructive pna  - aspiration precautions, SLP eval  - broaden abx, sputum cx, MRSA cx ordered  - de-escalate as soon as able

## 2024-12-05 NOTE — ASSESSMENT & PLAN NOTE
Patient has Known metastatic cancer of Lung primary. The cancer has metastasized to Liver, opposite Lung and possibly Lumbar Spine. The patient is not under the care of an outpatient oncologist. The patient is not undergoing active chemotherapy. Their staging information is listed below.     Oncology following  Multimodal pain control

## 2024-12-05 NOTE — ASSESSMENT & PLAN NOTE
Multimodal pain control  PT/OT  NSGY and Oncology consult  Prognosis poor on account of underlying Lung Ca    12/4/24  MRI spine ordered to evaluate for metastatic disease  Unable to obtain due to concerns for MRI safety  Medical records pending, patient reports undergoing MRI previously

## 2024-12-05 NOTE — PROGRESS NOTES
Pharmacist Renal Dose Adjustment Note    Katrina Duran is a 84 y.o. female being treated with the medication Cefepime.     Patient Data:    Vital Signs (Most Recent):  Temp: 97.8 °F (36.6 °C) (12/04/24 1930)  Pulse: 102 (12/04/24 1959)  Resp: 18 (12/04/24 2041)  BP: 135/88 (12/04/24 1930)  SpO2: 99 % (12/04/24 1959) Vital Signs (72h Range):  Temp:  [97.8 °F (36.6 °C)-99.2 °F (37.3 °C)]   Pulse:  []   Resp:  [14-29]   BP: ()/(45-96)   SpO2:  [55 %-99 %]      Recent Labs   Lab 12/01/24  0336 12/03/24  1225 12/04/24  0515   CREATININE 1.1 1.5* 1.5*     Serum creatinine: 1.5 mg/dL (H) 12/04/24 0515  Estimated creatinine clearance: 26.9 mL/min (A)    Medication: Cefepime 2 g every 8 hours will be changed to Cefepime 2 g every 12 hours for CrCl 30 to 60 ml/hr.     Thank you for allowing us to participate in this patient's care.     Kamryn Frederick, PharmSYBIL 12/04/2024 8:51 PM

## 2024-12-05 NOTE — NURSING
During bedside shift  report pt noted to be shivering and c/o being cold, pt had a change in mental status, was  disoriented time four. Patient also had  appeared to have vomited on her pillow case. Son was at the bedside and stated she had just started shivering when we walk in.  Vital at that time 135/88 hr 115 temp 97.9 Resp 20 o2 sats 77% room air,   NP at the bedside,Rapid response called. Patient Transferred to ICU .

## 2024-12-05 NOTE — ASSESSMENT & PLAN NOTE
NICKI is likely due to pre-renal azotemia due to dehydration. Baseline creatinine is  1.1 . Most recent creatinine and eGFR are listed below.  Recent Labs     12/04/24  0515 12/04/24 2026 12/05/24  0318   CREATININE 1.5* 1.3 1.3   EGFRNORACEVR 34* 40* 40*        Plan  - NICKI is improving  - Avoid nephrotoxins and renally dose meds for GFR listed above  - Monitor urine output, serial BMP, and adjust therapy as needed

## 2024-12-05 NOTE — HPI
Katrina Duran is an 84-year-old female with a PMHx of COPD, HTN, AAA w/stent graft, lung cancer (not currently on treatment, s/p radiation), CAD s/p CABG & PCI w/stent, who was admitted by hospital medicine for severe lowe back pain with difficulty ambulating. Patient was admitted by hospital medicine for acute intractable lumbar pain sec to L3 vertebral fracture and metastatic lung cancer with B lung mets as well as liver mets. Pt also has UTI, NICKI and Dehydration. Elevated BNP likely sec to NICKI. Pt started on IV rocephin, NS, Norco and IV MS for pain control. Will consult XRT for possible L/S Palliative Radiation. NSGY was also contacted and did not consider it a neurosurgical case.      Rapid response called tonight due to increased lethargy, unable to  SpO2 reliably, and was noted to be low-77%  when reading obtained. Patient noted to be favoring lying on left side, which is where lung mass w/collapse is noted. She also had an episode of vomiting tonight w/possible aspiration. ABG obtained, patient turned off of left side with improvement in mental status and SpO2. CXR with no interval change. Chart review shows patient has had norco, morphine, dilaudid, and flexeril today for severe pain. BNP noted to be elevated at 1800. IV fluids discontinued. She also was noted to have fever. She has been on Rocephin. CC consulted and patient transferred to ICU for closer monitoring.

## 2024-12-05 NOTE — ASSESSMENT & PLAN NOTE
- Multimodal pain mgmt  - Possible oversedation last night? Caution with IV meds  - Will try to obtain records from previous MRI at [facility]; possibly with mets to bone > will also see if aortic stent compatible with MRI here  - NSGY consulted by  w/no surgical intervention recommended  - Awaiting records from OSH d/t implant hx  - MRI pending to eval for metastatic disease  - Hold off on PT/OT as of now

## 2024-12-05 NOTE — ASSESSMENT & PLAN NOTE
Patient has metastatic cancer of lung primary. The cancer has metastasized to liver, opposite lung. The patient is not under the care of an outpatient oncologist. The patient is not undergoing active chemotherapy. .Their staging information is listed below.   Cancer Staging   No matching staging information was found for the patient.  - oncology consulted, following  - multimodal pain management

## 2024-12-05 NOTE — PT/OT/SLP PROGRESS
Occupational Therapy      Patient Name:  Katrina Duran   MRN:  [id number]    Patient with significant event evening prior resulting in transfer to ICU. Therapist presented to room this AM and CNA requested A with repositioning. Patient dependently pulled to HOB and log rolled to R with pillow offloading in attempts to minimize pain. Patient with extremely poor tolerance for all movement. Reporting >10/10 pain. Therapist informed family and patient will hold session and reassess later this AM.    Returned to ICU at 1325 to discuss POC with nurse, Chantal, and Dr. Harrell. Patient's work up continues as she is pending MRI for further assessment of involvement in spine. Will hold session this date until MRI is completed and therapist has a better understanding of medical picture to make appropriate goals.    Patient informed of hold and will assess in AM.    Carlyn Head OT  12/5/2024

## 2024-12-05 NOTE — ASSESSMENT & PLAN NOTE
NICKI is likely due to post-obstructive d/t urinary retention . Baseline creatinine is  1.1 . Most recent creatinine and eGFR are listed below.  Recent Labs     12/03/24  1225 12/04/24  0515 12/04/24 2026   CREATININE 1.5* 1.5* 1.3   EGFRNORACEVR 34* 34* 40*      Plan  - NICKI is improving  - Avoid nephrotoxins and renally dose meds for GFR listed above  - Monitor urine output, serial BMP, and adjust therapy as needed  - d/c IVF, s/p furosemide  - mayen placed w/good UOP

## 2024-12-05 NOTE — ASSESSMENT & PLAN NOTE
- multimodal pain mgmt  - PT/OT  - NSGY consulted by  w/no surgical intervention recommended  - awaiting records from OSH d/t implant hx  - MRI pending to eval for metastatic disease

## 2024-12-05 NOTE — PT/OT/SLP PROGRESS
Physical Therapy      Patient Name:  Katrina Duran   MRN:  [id number]    HOLD TX. AT THIS TIME PER DR. BERG.  PT IS PENDING MRI FOR FURTHER ASSESSMENT OF INVOLVEMENT IN SPINE, WILL CONTINUE TO ASSESS SITUATION    Nusrat Bey, PT  12/5/2024  2104

## 2024-12-05 NOTE — HOSPITAL COURSE
12/4/24  Admitted for back pain x 1 week  Pain not well controlled, adjusted PRN regimen  Found to have L3 fracture  RN reports patient retained 650 cc overnight, required straight cath  Noted urinary retention again this afternoon  Shelton catheter ordered  ---  Patient with history of abdominal aorta repair with hardware.  Per MRI department, no documentation at this time to investigate if obtaining MRI is safe at this time.  Patient reportedly had an MRI done at outside facility several months ago, medical records pending.  MRI needed to evaluate for possible metastatic spread in setting of underlying lung cancer.  Patient wishes to transfer her care to [facility]. Heme/Onc following.    12/5/24  Reviewed overnight events, transferred to ICU due to lethargy, hypoxia (lowest O2 sat 77%).  Possibly aspirated vomitus. BNP elevated 1800. IV fluids were stopped  Started on IV antibiotics, currently on Zosyn.  Currently on 2L NC  Stable to return to floor  ---  MRI lumbar spine was done on Oct 4th 2024 at [facility] in [place], [place] - phone number [phone].   There office will send the report to fax number in ICU - [phone].  Updated MRI/Radiology staff via secure chat.  Updated patient's family members in waiting room.    12/6/24  NAEON, patient states no pain when laying still  Severe pain with movement, controlled with current PRN regimen  Plans for MRI this evening  Currently no Neurosurgical coverage at this time  Discussed possibly transferring to [facility], pending MRI findings  Updated patient's family at bedside    12/07/2024   Patient in mild distress with ongoing pain   Alert and oriented   MRI thoracic, lumbar spine-findings suggestive of bone metastasis  Discussed findings with patient/son at bedside, daughters over phone-- given patient's age, underlying comorbidities, acute issues, opting to proceed with hospice; declined any further interventions, neurosurgical consultation at this point.     Open for possible palliative radiation therapy, consulted radiation oncologist per Heme-Onc recommendations   Consulted palliative for symptom management   Blood culture x2 as of 12/04/2024 positive for Gram-positive cocci, currently on empiric antibiotics, ordered for repeat blood cultures, id follow up     12/08/2024   Alert awake, oriented x3   Resting comfortably, complaining of intermittent pain, currently on pain regimen   Ordered x-ray left femur/hip per Radiation Oncology recommendations,; radiation oncology planning for trended to radiation therapy tomorrow   Consulted  for hospice   Repeat blood cultures from yesterday negative to date   Anticipate discharge in next 24-48 hours to home under hospice  Discussed plan of care with patient/daughter at bedside, agreed    12/9/24  Status post palliative radiation today, followed by pain, nausea-- currently on Decadron, adjusted pain medications, ordered IV antiemetics   Given significant pain, nausea, patient/family prefers to be monitored overnight, based on symptom improvement, we will aim for discharge in next 24-48 hours      12/10/2024   Examination done at bedside, resting comfortably, alert awake   Stated improvement in pain, discomfort   Patient/family agreeable for discharge to home under hospice today,  worked on arrangements for the discharge process   Discussed with ID regarding antibiotic regimen, appreciate recommendations, ordered medications accordingly   Discharge today to home under hospice through [facility] of [place]

## 2024-12-05 NOTE — PROGRESS NOTES
[facility] ([facility])  Critical Care Medicine  Progress Note    Patient Name: Katrina Duran  MRN: [id number]  Admission Date: 12/3/2024  Hospital Length of Stay: 1 days  Code Status: DNR  Attending Provider: Candelaria Harrell MD  Primary Care Provider: Liliya Freitas DO   Principal Problem: Acute hypoxemic respiratory failure    Subjective:     HPI:  Katrina Duran is an 84-year-old female with a PMHx of COPD, HTN, AAA w/stent graft, lung cancer (not currently on treatment, s/p radiation), CAD s/p CABG & PCI w/stent, who was admitted by hospital medicine for severe lowe back pain with difficulty ambulating. Patient was admitted by [facility] medicine for acute intractable lumbar pain sec to L3 vertebral fracture and metastatic lung cancer with B lung mets as well as liver mets. Pt also has UTI, NICKI and Dehydration. Elevated BNP likely sec to NICKI. Pt started on IV rocephin, NS, Norco and IV MS for pain control. Will consult XRT for possible L/S Palliative Radiation. NSGY was also contacted and did not consider it a neurosurgical case.      Rapid response called tonight due to increased lethargy, unable to  SpO2 reliably, and was noted to be low-77%  when reading obtained. Patient noted to be favoring lying on left side, which is where lung mass w/collapse is noted. She also had an episode of vomiting tonight w/possible aspiration. ABG obtained, patient turned off of left side with improvement in mental status and SpO2. CXR with no interval change. Chart review shows patient has had norco, morphine, dilaudid, and flexeril today for severe pain. BNP noted to be elevated at 1800. IV fluids discontinued. She also was noted to have fever. She has been on Rocephin. CC consulted and patient transferred to ICU for closer monitoring.      Hospital/ICU Course:  12/05/2024: on RA on exam. Uncomfortable in bed due to back pain. Trying to obtain previous MRI report.     Objective:     Vital Signs (Most  Recent):  Temp: 98.2 °F (36.8 °C) (12/05/24 1105)  Pulse: 65 (12/05/24 1327)  Resp: 18 (12/05/24 1327)  BP: (!) 163/69 (12/05/24 1105)  SpO2: 96 % (12/05/24 1327) Vital Signs (24h Range):  Temp:  [97.5 °F (36.4 °C)-101.7 °F (38.7 °C)] 98.2 °F (36.8 °C)  Pulse:  [] 65  Resp:  [5-29] 18  SpO2:  [55 %-100 %] 96 %  BP: ()/(45-88) 163/69     Weight: 79 kg (174 lb 2.6 oz)  Body mass index is 30.85 kg/m².  Intake/Output Summary (Last 24 hours) at 12/5/2024 1339  Last data filed at 12/5/2024 1100  Gross per 24 hour   Intake 2810.81 ml   Output 1186 ml   Net 1624.81 ml     Physical Exam  Constitutional:       General: She is not in acute distress.     Appearance: She is ill-appearing.   HENT:      Head: Normocephalic.      Mouth/Throat:      Mouth: Mucous membranes are moist.   Eyes:      Conjunctiva/sclera: Conjunctivae normal.      Pupils: Pupils are equal, round, and reactive to light.   Cardiovascular:      Rate and Rhythm: Normal rate and regular rhythm.   Pulmonary:      Effort: Pulmonary effort is normal. No respiratory distress.      Breath sounds: No wheezing or rales.   Abdominal:      General: There is no distension.      Palpations: Abdomen is soft.      Tenderness: There is no abdominal tenderness.   Musculoskeletal:         General: No swelling.   Skin:     General: Skin is warm.      Capillary Refill: Capillary refill takes less than 2 seconds.      Coloration: Skin is not jaundiced.      Findings: No bruising.   Neurological:      Mental Status: She is alert and oriented to person, place, and time.   Psychiatric:         Behavior: Behavior normal.      Review of Systems   Constitutional:  Negative for fatigue and fever.   Respiratory:  Negative for cough and shortness of breath.    Cardiovascular:  Negative for chest pain and leg swelling.   Gastrointestinal:  Negative for abdominal pain and nausea.   Genitourinary:  Negative for dysuria.   Musculoskeletal:  Positive for back pain.   Neurological:   Positive for weakness. Negative for dizziness and headaches.     Vents:  Oxygen Concentration (%): 28 (12/05/24 1327)    Lines/Drains/Airways       Drain  Duration                  Urethral Catheter 12/04/24 2012 Temperature probe 16 Fr. <1 day              Peripheral Intravenous Line  Duration                  Peripheral IV - Single Lumen 12/04/24 2016 20 G Anterior;Left;Proximal Forearm <1 day         Peripheral IV - Single Lumen 12/04/24 2017 20 G Anterior;Proximal;Right Forearm <1 day                  Significant Labs:    CBC/Anemia Profile:  Recent Labs   Lab 12/04/24 0515 12/04/24 1944 12/04/24 2026 12/05/24 0318   WBC 10.70  --  11.09 9.64   HGB 10.7*  --  10.7* 10.2*   HCT 35.2* 32* 34.3* 32.3*   *  --  139* 130*   MCV 87  --  85 85   RDW 15.2*  --  15.2* 15.2*     Chemistries:  Recent Labs   Lab 12/04/24 0515 12/04/24 2026 12/05/24 0318   * 130* 133*   K 4.4 4.8 4.8    104 107   CO2 20* 14* 17*   BUN 47* 47* 48*   CREATININE 1.5* 1.3 1.3   CALCIUM 8.7 8.5* 8.4*   ALBUMIN 2.5* 2.6* 2.4*   PROT 5.8* 6.0 5.6*   BILITOT 0.6 0.6 0.5   ALKPHOS 107 110 106   ALT 9* 14 14   AST 16 21 16   MG 2.2 2.1 2.2   PHOS 4.1  --  4.3     Significant Imaging:  I have reviewed all pertinent imaging results/findings within the past 24 hours.  I have reviewed and interpreted all pertinent imaging results/findings within the past 24 hours.    ABG  Recent Labs   Lab 12/04/24 1944   PH 7.237*   PO2 212*   PCO2 41.5   HCO3 17.7*   BE -10*     Assessment/Plan:     Neuro  Closed fracture of third lumbar vertebra  - Multimodal pain mgmt  - Possible oversedation last night? Caution with IV meds  - Will try to obtain records from previous MRI at [facility]; possibly with mets to bone > will also see if aortic stent compatible with MRI here  - NSGY consulted by [facility] w/no surgical intervention recommended  - Awaiting records from OSH d/t implant hx  - MRI pending to eval for metastatic disease  - Hold off on PT/OT as of  now    Pulmonary  * Acute hypoxemic respiratory failure  - Patient with Hypoxic Respiratory failure which is Acute.  she is not on home oxygen. Supplemental oxygen was provided and noted- Oxygen Concentration (%):  [28] 28  - Signs/symptoms of respiratory failure include- tachypnea, increased work of breathing, respiratory distress, wheezing, and lethargy. Contributing diagnoses includes - Aspiration, COPD, and Pneumonia Labs and images were reviewed. Patient Has recent ABG, which has been reviewed.   - Oversedation vs aspiration?  - Oxygenation has improved, have weaned down to RA; comfortable in bed  - Continue scheduled nebs  - IV steroids have been d/c'ed as patient has shown improvement  - Abx broadened after events overnight; will de-escalate down to zosyn     Cardiac/Vascular  HLD (hyperlipidemia)  - Continue statin    Renal/  NICKI (acute kidney injury)  - NICKI is likely due to post-obstructive d/t urinary retention . Baseline creatinine is  1.1 . Most recent creatinine and eGFR are listed below.  Recent Labs     12/04/24  0515 12/04/24 2026 12/05/24  0318   CREATININE 1.5* 1.3 1.3   EGFRNORACEVR 34* 40* 40*   - NICKI is improving  - Avoid nephrotoxins and renally dose meds for GFR listed above  - Monitor urine output, serial BMP, and adjust therapy as needed  - Possible bladder outlet obstruction on CT; mayen was placed   - Cr has trended down   - mayen placed w/good UOP    UTI (urinary tract infection)  - Mayen placed d/t retention  - Urine cx with no growth     Oncology  Metastatic lung cancer (metastasis from lung to other site)  - Patient has metastatic cancer of lung primary. The cancer has metastasized to liver, opposite lung. The patient is not under the care of an outpatient oncologist. The patient is not undergoing active chemotherapy. .Their staging information is listed below.   - CT scan from 12/1 shows worsening disease with liver mets; possible mets to bone but unclear  - History is bit unclear  about diagnosis/treatment; received 2 targeted radiation sessions but no chemo; patient states that she would never want chemotherapy  - Has followed with [facility] but would like to transition care to [facility]  - Respiratory status is currently stable  - Appreciate oncology assistance    Palliative Care  Advanced care planning/counseling discussion  Advance Care Planning    Date: 12/05/2024  [facility]  I engaged the family in a voluntary conversation about advance care planning and we specifically addressed what the goals of care would be moving forward, in light of the patient's change in clinical status, specifically respiratory failure.  We did specifically address the patient's likely prognosis, which is poor.  We explored the patient's values and preferences for future care.  The family endorses that what is most important right now is to focus on avoiding the hospital, symptom/pain control, improvement in condition but with limits to invasive therapies, and comfort and QOL     Accordingly, we have decided that the best plan to meet the patient's goals includes continuing with treatment, but no resuscitation or mechanical ventilation. Family will likely seek information regarding palliative care in am.     Code Status  In light of the patients advanced and life limiting illness,I engaged the the family in a voluntary conversation about the patient's preferences for care  at the very end of life. The patient wishes to have a natural, peaceful death.  Along those lines, the patient does not wish to have CPR or other invasive treatments performed when her heart and/or breathing stops. I communicated to the family that a DNR order would be placed in her medical record to reflect this preference.    A total of 39 min was spent on advance care planning, goals of care discussion, emotional support, formulating and communicating prognosis and exploring burden/benefit of various approaches of treatment. This discussion occurred on  a fully voluntary basis with the verbal consent of the patient and/or family.      Critical Care Daily Checklist:    A: Awake: RASS Goal/Actual Goal:  N/a  Actual:  N/a   B: Spontaneous Breathing Trial Performed?  N/a   C: SAT & SBT Coordinated?  N/a                      D: Delirium: CAM-ICU  N/a   E: Early Mobility Performed? Yes   F: Feeding Goal: Goals: 1. Pt will tolerate and consume >75% EEN and EPN prior to RD follow up 2. Pt's nausea will be resolved prior to RD follow up 3. Pt will have a BM prior to RD follow up  Status: Nutrition Goal Status: new   Current Diet Order   Procedures    Diet Cardiac Soft & Bite Sized (IDDSI Level 6); Standard Tray     Order Specific Question:   Additional Diet Options:     Answer:   Soft & Bite Sized (IDDSI Level 6)     Order Specific Question:   Tray type:     Answer:   Standard Tray      AS: Analgesia/Sedation Norco prn   T: Thromboembolic Prophylaxis Heparin   H: HOB > 300 Yes   U: Stress Ulcer Prophylaxis (if needed) Protonix   G: Glucose Control Monitor   B: Bowel Function  Monitor   I: Indwelling Catheter (Lines & Mayen) Necessity PIV, mayen   D: De-escalation of Antimicrobials/Pharmacotherapies Zosyn     Plan for the day/ETD Step down to floor    Code Status:  Family/Goals of Care: DNR       Patient is stable for step down to floor. Critical care will sign off. Hospital medicine has been consulted. Please call with any further questions or concerns.       Hermelindo Bryan PA-C  Critical Care Medicine  [facility]ensive [facility] ([facility])

## 2024-12-05 NOTE — ASSESSMENT & PLAN NOTE
NICKI is likely due to pre-renal azotemia due to dehydration. Baseline creatinine is  1.1 . Most recent creatinine and eGFR are listed below.  Recent Labs     12/03/24  1225 12/04/24  0515   CREATININE 1.5* 1.5*   EGFRNORACEVR 34* 34*        Plan  - NICKI is worsening. Will continue current treatment  - Avoid nephrotoxins and renally dose meds for GFR listed above  - Monitor urine output, serial BMP, and adjust therapy as needed  - cont NS IVF

## 2024-12-05 NOTE — ASSESSMENT & PLAN NOTE
- NICKI is likely due to post-obstructive d/t urinary retention . Baseline creatinine is  1.1 . Most recent creatinine and eGFR are listed below.  Recent Labs     12/04/24  0515 12/04/24 2026 12/05/24  0318   CREATININE 1.5* 1.3 1.3   EGFRNORACEVR 34* 40* 40*   - NICKI is improving  - Avoid nephrotoxins and renally dose meds for GFR listed above  - Monitor urine output, serial BMP, and adjust therapy as needed  - Possible bladder outlet obstruction on CT; mayen was placed   - Cr has trended down   - mayen placed w/good UOP   Hpi Title: Evaluation of a Skin Lesion Have Your Spot(S) Been Treated In The Past?: has not been treated

## 2024-12-05 NOTE — PROGRESS NOTES
Therapy with vancomycin complete and/or consult discontinued by provider.  Pharmacy will sign off, please re-consult as needed.    Thank you for allowing us to participate in this patient's care.   Katherine McArdle, Pharm.D. 12/5/2024 11:09 AM

## 2024-12-05 NOTE — HOSPITAL COURSE
12/05/2024: on RA on exam. Uncomfortable in bed due to back pain. Trying to obtain previous MRI report.

## 2024-12-05 NOTE — SUBJECTIVE & OBJECTIVE
Review of Systems   All other systems reviewed and are negative.    Objective:     Vital Signs (Most Recent):  Temp: 98.3 °F (36.8 °C) (12/04/24 1557)  Pulse: 74 (12/04/24 1620)  Resp: 20 (12/04/24 1557)  BP: (!) 101/56 (12/04/24 1620)  SpO2: (!) 94 % (12/04/24 1620) Vital Signs (24h Range):  Temp:  [97.9 °F (36.6 °C)-98.7 °F (37.1 °C)] 98.3 °F (36.8 °C)  Pulse:  [71-84] 74  Resp:  [16-20] 20  SpO2:  [90 %-97 %] 94 %  BP: ()/(45-96) 101/56     Weight: 77 kg (169 lb 12.1 oz)  Body mass index is 30.07 kg/m².    Intake/Output Summary (Last 24 hours) at 12/4/2024 1845  Last data filed at 12/4/2024 1425  Gross per 24 hour   Intake --   Output 1164 ml   Net -1164 ml         Physical Exam  Vitals and nursing note reviewed.   Constitutional:       General: She is not in acute distress.     Appearance: Normal appearance. She is normal weight. She is ill-appearing.   Cardiovascular:      Rate and Rhythm: Normal rate and regular rhythm.      Heart sounds: No murmur heard.  Pulmonary:      Effort: Pulmonary effort is normal. No respiratory distress.      Breath sounds: No wheezing.   Neurological:      General: No focal deficit present.      Mental Status: She is alert and oriented to person, place, and time.   Psychiatric:         Mood and Affect: Mood normal.         Behavior: Behavior normal.             Significant Labs: All pertinent labs within the past 24 hours have been reviewed.  Recent Lab Results         12/04/24  0618   12/04/24  0515   12/03/24  2132        Albumin   2.5         ALP   107         ALT   9         Anion Gap   9         Appearance, UA Clear           AST   16         Bacteria, UA None           Baso #   0.03         Basophil %   0.3         Bilirubin (UA) Negative           BILIRUBIN TOTAL   0.6  Comment: For infants and newborns, interpretation of results should be based  on gestational age, weight and in agreement with clinical  observations.    Premature Infant recommended reference  ranges:  Up to 24 hours.............<8.0 mg/dL  Up to 48 hours............<12.0 mg/dL  3-5 days..................<15.0 mg/dL  6-29 days.................<15.0 mg/dL           BUN   47         Calcium   8.7         Chloride   103         CO2   20         Color, UA Yellow           Creatinine   1.5         Differential Method   Automated         eGFR   34         Eos #   0.0         Eos %   0.3         Glucose   145         Glucose, UA Negative           Gran # (ANC)   9.6         Gran %   89.4         Hematocrit   35.2         Hemoglobin   10.7         Hyaline Casts, UA 1           Immature Grans (Abs)   0.08  Comment: Mild elevation in immature granulocytes is non specific and   can be seen in a variety of conditions including stress response,   acute inflammation, trauma and pregnancy. Correlation with other   laboratory and clinical findings is essential.           Immature Granulocytes   0.7         Ketones, UA Negative           Lactic Acid Level     1.8  Comment: Falsely low lactic acid results can be found in samples   containing >=13.0 mg/dL total bilirubin and/or >=3.5 mg/dL   direct bilirubin.         Leukocyte Esterase, UA Negative           Lymph #   0.3         Lymph %   2.6         Magnesium    2.2         MCH   26.5         MCHC   30.4         MCV   87         Microscopic Comment SEE COMMENT  Comment: Other formed elements not mentioned in the report are not   present in the microscopic examination.              Mono #   0.7         Mono %   6.7         MPV   10.9         NITRITE UA Negative           nRBC   0         Blood, UA Negative           pH, UA 6.0           Phosphorus Level   4.1         Platelet Count   143         Potassium   4.4         PROTEIN TOTAL   5.8         Protein, UA 1+  Comment: Recommend a 24 hour urine protein or a urine   protein/creatinine ratio if globulin induced proteinuria is  clinically suspected.             RBC   4.04         RBC, UA 0           RDW   15.2          Sodium   132         Spec Grav UA >1.030           Specimen UA Urine, Clean Catch           UROBILINOGEN UA 2.0-3.0           WBC, UA 2           WBC   10.70                 Significant Imaging: I have reviewed all pertinent imaging results/findings within the past 24 hours.    X-Ray Chest AP Portable   Final Result      Worsening left-sided airspace opacity in comparison to the prior radiograph.  Underlying mass not excluded.         Electronically signed by: Cam Obrien   Date:    12/03/2024   Time:    13:08      MRI Cervical Spine W WO Cont    (Results Pending)   MRI Lumbar Spine W WO Cont    (Results Pending)   MRI Thoracic Spine W WO Cont    (Results Pending)

## 2024-12-05 NOTE — NURSING TRANSFER
Nursing Transfer Note      12/5/2024   4:36 PM    Nurse giving handoff: UVALDO Cornejo  Nurse receiving handoff: UVALDO Curran    Reason patient is being transferred: downgrade from ICU    Transfer To: 570    Transfer via bed    Transfer with cardiac monitoring    Transported by UVALDO Barker and Kenzie HUSTON      Telemetry: Box Number [id number]  Order for Tele Monitor? Yes    Additional Lines: [place]on Catheter    Medicines sent: N/A    Any special needs or follow-up needed: n/a    Patient belongings transferred with patient: Yes    Chart send with patient: Yes    Notified: family at bedside with pt    Upon arrival to floor: cardiac monitor applied, patient oriented to room, call bell in reach, and bed in lowest position

## 2024-12-05 NOTE — SIGNIFICANT EVENT
Called to check on patient, who was resting easily, but then awakened with tremors and change in mental status.   Family at bedside.  VS:   Hard to  SpO2, reading low -- Oxygen increased.  Will get STAT ABG and CXR as well as additional labs    While waiting on STAT orders, patient decompensated further.  Upon chart review, it is noted that BNP elevated to 1873 and has been on continuous IV fluids- which have been discontinued.  Rapid response was called.    Unable to  SpO2 accurately, despite increasing oxygen to NRB.    Patient is full code with change in status.  Dr. Krishna at bedside evaluating as well, decision made to transfer to ICU at this time for closer monitoring.  Jesusita Peters NP also at bedside with critical care team.

## 2024-12-05 NOTE — EICU
eICU Physician Brief Note    Chart reviewed. On camera, the patient is resting in bed, in NAD.  Mrs Katrina Duran is an 84 year old lady admitted 12/3 with intractable back pain, UTI and NICKI. During the evening of 12/4, the patient developed hypoxia, fever and lethargy. Trsf to the ICU. Workup showed no hypercapnia. CxR showed a persistent left mid- and lower lung opacity.  PMH: COPD, HTN, CAD/CABG and PCI/stent, AAA w stent graft, lung CA s/p XRT (11/2023) with suspected liver mets.  Labs and investigations reviewed.  Current medications reviewed.  A/P:  Hypoxic respiratory failure  Continue O2 supplements.  Mobilize as much as possible.  Incentive spirometry.  Possible post-obstructive PNA vs HCAP  Abx broadened to Cefepime/Vanco.  Follow cultures and de-escalate as soon as able.  NICKI  Likely ATN vs pre-renal.  Monitor Cre and electrolytes.  GI/DVT prophylaxis - diet as able; SC Heparin, mobility protocol.    Patient's code status is DNR which is approriate given her chronic and acute medical issues as well as age, which all affect prognosis.  eICU is available should acute issues arise.

## 2024-12-05 NOTE — PROGRESS NOTES
[facility] Medicine  Progress Note    Patient Name: Katrina Duran  MRN: [id number]  Patient Class: IP- Inpatient   Admission Date: 12/3/2024  Length of Stay: 0 days  Attending Physician: Jim Araujo MD  Primary Care Provider: Liliya Freitas DO        Subjective     Principal Problem:Closed fracture of third lumbar vertebra        HPI:  Katrina Duran is a 84 y.o. female with PMHx of HTN, COPD, Lung Ca and CAD s/p CABG in 1999 and stent in 2006, brought to ER by EMS for severe lower back pain that radiates to her right leg since July of this year. Pt reports she is unable to stand and ambulate, like her legs are giving out upon standing and experiences excruciating pain when getting out of bed. Pt notes that prior to ED visit, she urinated at around 12:30 AM. She denies any numbness upon wiping. Pt's daughter reports she has not eaten in the last few days. Pt reports having a subjective fever but no measured oral temperature. She also c/o nausea. Pt denies any fall, cough, CP, SOB, dysuria, and abd pain.     Pt has hx lung cancer and received radiation in November of 2023. She had appointment with her PCP yesterday and was told her lung cancer has increased in size. Pt was prescribed 5 mg Percocet yesterday by her PCP to manage her pain. Pt was also seen in ED last Saturday. She has an upcoming appointment with pain management.      In the ER, VSS Afeb, labs showed WBC 12.4, H/H 11/35, Segs 92Na 134, HCo3 17, Bun/Cr  43/1.5. Lactate 1.8, BNP 1873. UA ++ve on 12/1/24.     CT L/S on 12/1-  subtle nondisplaced fracture involving the inferior, posterior and left lateral corner of the L3 vertebral body. Marked degenerative disc changes and degenerative facet arthropathy noted diffusely with marked convex left curvature of the lumbar spine.  Multilevel, multifactorial moderate to severe spinal canal stenosis and nerve root foramen narrowing, most significantly involving the L3/L4 disc level.      3.  Aortic stent graft in place, with similar amount of mild indistinction surrounding the stent graft when compared to the prior study, of doubtful clinical significance.    CTA Chest Abdomen and Pelvis: 12/1/24  1.  Detrimental change.  Interval increase in angular opacity within the lingula, likely a combination of partial collapse with possible underlying mass.  Interval increase in size of semi-solid right lower lobe nodule with increase central solid component, currently measuring 4.2 cm.  Interval development of a 7 mm spiculated nodule in the right lung apex.  Interval development of at least 2 hepatic masses measuring up to 3.9 cm.  These changes must be considered neoplastic in origin until proven otherwise.  A repeat PET CT scan would be helpful.  4.  Negative for pulmonary embolism, aneurysm or dissection.  Negative for intraperitoneal free air, free fluid or hemoperitoneum.    Hosp Med consulted and pt admitted for acute intractable Lumbar pain sec to L3 vertebral fracture and metastatic Lung Ca with B lung mets as well as Liver mets. Pt also has UTI, NICKI and Dehydration. Elevated BNP likely sec to NICKI. Pt started on IV rocephin, NS, Norco and IV MS for pain control. Will consult XRT for possible L/S Palliative Radiation. NSGY was also contacted and did not consider it a neurosurgical case.         Overview/Hospital Course:    12/4/24  Admitted for back pain x 1 week  Pain not well controlled, adjusted PRN regimen  Found to have L3 fracture  RN reports patient retained 650 cc overnight, required straight cath  Noted urinary retention again this afternoon  Shelton catheter ordered  ---  Patient with history of abdominal aorta repair with hardware.  Per MRI department, no documentation at this time to investigate if obtaining MRI is safe at this time.  Patient reportedly had an MRI done at outside facility several months ago, medical records pending.  MRI needed to evaluate for possible metastatic spread  in setting of underlying lung cancer.  Patient wishes to transfer her care to [facility]. Heme/Onc following.      Review of Systems   All other systems reviewed and are negative.    Objective:     Vital Signs (Most Recent):  Temp: 98.3 °F (36.8 °C) (12/04/24 1557)  Pulse: 74 (12/04/24 1620)  Resp: 20 (12/04/24 1557)  BP: (!) 101/56 (12/04/24 1620)  SpO2: (!) 94 % (12/04/24 1620) Vital Signs (24h Range):  Temp:  [97.9 °F (36.6 °C)-98.7 °F (37.1 °C)] 98.3 °F (36.8 °C)  Pulse:  [71-84] 74  Resp:  [16-20] 20  SpO2:  [90 %-97 %] 94 %  BP: ()/(45-96) 101/56     Weight: 77 kg (169 lb 12.1 oz)  Body mass index is 30.07 kg/m².    Intake/Output Summary (Last 24 hours) at 12/4/2024 1845  Last data filed at 12/4/2024 1425  Gross per 24 hour   Intake --   Output 1164 ml   Net -1164 ml         Physical Exam  Vitals and nursing note reviewed.   Constitutional:       General: She is not in acute distress.     Appearance: Normal appearance. She is normal weight. She is ill-appearing.   Cardiovascular:      Rate and Rhythm: Normal rate and regular rhythm.      Heart sounds: No murmur heard.  Pulmonary:      Effort: Pulmonary effort is normal. No respiratory distress.      Breath sounds: No wheezing.   Neurological:      General: No focal deficit present.      Mental Status: She is alert and oriented to person, place, and time.   Psychiatric:         Mood and Affect: Mood normal.         Behavior: Behavior normal.             Significant Labs: All pertinent labs within the past 24 hours have been reviewed.  Recent Lab Results         12/04/24  0618   12/04/24  0515   12/03/24  2132        Albumin   2.5         ALP   107         ALT   9         Anion Gap   9         Appearance, UA Clear           AST   16         Bacteria, UA None           Baso #   0.03         Basophil %   0.3         Bilirubin (UA) Negative           BILIRUBIN TOTAL   0.6  Comment: For infants and newborns, interpretation of results should be based  on  gestational age, weight and in agreement with clinical  observations.    Premature Infant recommended reference ranges:  Up to 24 hours.............<8.0 mg/dL  Up to 48 hours............<12.0 mg/dL  3-5 days..................<15.0 mg/dL  6-29 days.................<15.0 mg/dL           BUN   47         Calcium   8.7         Chloride   103         CO2   20         Color, UA Yellow           Creatinine   1.5         Differential Method   Automated         eGFR   34         Eos #   0.0         Eos %   0.3         Glucose   145         Glucose, UA Negative           Gran # (ANC)   9.6         Gran %   89.4         Hematocrit   35.2         Hemoglobin   10.7         Hyaline Casts, UA 1           Immature Grans (Abs)   0.08  Comment: Mild elevation in immature granulocytes is non specific and   can be seen in a variety of conditions including stress response,   acute inflammation, trauma and pregnancy. Correlation with other   laboratory and clinical findings is essential.           Immature Granulocytes   0.7         Ketones, UA Negative           Lactic Acid Level     1.8  Comment: Falsely low lactic acid results can be found in samples   containing >=13.0 mg/dL total bilirubin and/or >=3.5 mg/dL   direct bilirubin.         Leukocyte Esterase, UA Negative           Lymph #   0.3         Lymph %   2.6         Magnesium    2.2         MCH   26.5         MCHC   30.4         MCV   87         Microscopic Comment SEE COMMENT  Comment: Other formed elements not mentioned in the report are not   present in the microscopic examination.              Mono #   0.7         Mono %   6.7         MPV   10.9         NITRITE UA Negative           nRBC   0         Blood, UA Negative           pH, UA 6.0           Phosphorus Level   4.1         Platelet Count   143         Potassium   4.4         PROTEIN TOTAL   5.8         Protein, UA 1+  Comment: Recommend a 24 hour urine protein or a urine   protein/creatinine ratio if globulin induced  proteinuria is  clinically suspected.             RBC   4.04         RBC, UA 0           RDW   15.2         Sodium   132         Spec Grav UA >1.030           Specimen UA Urine, Clean Catch           UROBILINOGEN UA 2.0-3.0           WBC, UA 2           WBC   10.70                 Significant Imaging: I have reviewed all pertinent imaging results/findings within the past 24 hours.    X-Ray Chest AP Portable   Final Result      Worsening left-sided airspace opacity in comparison to the prior radiograph.  Underlying mass not excluded.         Electronically signed by: Cam Obrien   Date:    12/03/2024   Time:    13:08      MRI Cervical Spine W WO Cont    (Results Pending)   MRI Lumbar Spine W WO Cont    (Results Pending)   MRI Thoracic Spine W WO Cont    (Results Pending)         Assessment and Plan     * Closed fracture of third lumbar vertebra  Multimodal pain control  PT/OT  NSGY and Oncology consult  Prognosis poor on account of underlying Lung Ca    12/4/24  MRI spine ordered to evaluate for metastatic disease  Unable to obtain due to concerns for MRI safety  Medical records pending, patient reports undergoing MRI previously    NICKI (acute kidney injury)  NICKI is likely due to pre-renal azotemia due to dehydration. Baseline creatinine is  1.1 . Most recent creatinine and eGFR are listed below.  Recent Labs     12/03/24  1225 12/04/24  0515   CREATININE 1.5* 1.5*   EGFRNORACEVR 34* 34*        Plan  - NICKI is worsening. Will continue current treatment  - Avoid nephrotoxins and renally dose meds for GFR listed above  - Monitor urine output, serial BMP, and adjust therapy as needed  - cont NS IVF    Metastatic lung cancer (metastasis from lung to other site)  Patient has Known metastatic cancer of Lung primary. The cancer has metastasized to Liver, opposite Lung and possibly Lumbar Spine. The patient is not under the care of an outpatient oncologist. The patient is not undergoing active chemotherapy. Their staging  information is listed below.     Oncology following  Multimodal pain control      UTI (urinary tract infection)  As seen on UA 12/1- asymptomatic, no obvious dysuria sec to severe LBP  Will Treat with IVF and IV Rocephin  Urine Cx from 12/1- Neg    12/4/24  Ceftriaxone day # 2  Shelton placed due to urinary retention        VTE Risk Mitigation (From admission, onward)           Ordered     heparin (porcine) injection 5,000 Units  Every 8 hours         12/03/24 1837     IP VTE HIGH RISK PATIENT  Once         12/03/24 1837     Place sequential compression device  Until discontinued         12/03/24 1837                    Discharge Planning   LACIE:      Code Status: Full Code   Medical Readiness for Discharge Date:   Discharge Plan A: Home with family                Please place Justification for DME        Jim Araujo MD  Department of Hospital Medicine   [facility] - Telemetry ([facility])

## 2024-12-05 NOTE — ASSESSMENT & PLAN NOTE
Advance Care Planning     Date: 12/05/2024  [facility]  I engaged the family in a voluntary conversation about advance care planning and we specifically addressed what the goals of care would be moving forward, in light of the patient's change in clinical status, specifically respiratory failure.  We did specifically address the patient's likely prognosis, which is poor.  We explored the patient's values and preferences for future care.  The family endorses that what is most important right now is to focus on avoiding the hospital, symptom/pain control, improvement in condition but with limits to invasive therapies, and comfort and QOL     Accordingly, we have decided that the best plan to meet the patient's goals includes continuing with treatment, but no resuscitation or mechanical ventilation. Family will likely seek information regarding palliative care in am.     Code Status  In light of the patients advanced and life limiting illness,I engaged the the family in a voluntary conversation about the patient's preferences for care  at the very end of life. The patient wishes to have a natural, peaceful death.  Along those lines, the patient does not wish to have CPR or other invasive treatments performed when her heart and/or breathing stops. I communicated to the family that a DNR order would be placed in her medical record to reflect this preference.    A total of 39 min was spent on advance care planning, goals of care discussion, emotional support, formulating and communicating prognosis and exploring burden/benefit of various approaches of treatment. This discussion occurred on a fully voluntary basis with the verbal consent of the patient and/or family.

## 2024-12-05 NOTE — ASSESSMENT & PLAN NOTE
As seen on UA 12/1- asymptomatic, no obvious dysuria sec to severe LBP  Will Treat with IVF and IV Rocephin  Urine Cx from 12/1- Neg    12/4/24  Ceftriaxone day # 2  Shelton placed due to urinary retention    12/5/24  Urine culture no growth  Ceftriaxone stopped

## 2024-12-05 NOTE — SUBJECTIVE & OBJECTIVE
Objective:     Vital Signs (Most Recent):  Temp: 98.2 °F (36.8 °C) (12/05/24 1105)  Pulse: 65 (12/05/24 1327)  Resp: 18 (12/05/24 1327)  BP: (!) 163/69 (12/05/24 1105)  SpO2: 96 % (12/05/24 1327) Vital Signs (24h Range):  Temp:  [97.5 °F (36.4 °C)-101.7 °F (38.7 °C)] 98.2 °F (36.8 °C)  Pulse:  [] 65  Resp:  [5-29] 18  SpO2:  [55 %-100 %] 96 %  BP: ()/(45-88) 163/69     Weight: 79 kg (174 lb 2.6 oz)  Body mass index is 30.85 kg/m².  Intake/Output Summary (Last 24 hours) at 12/5/2024 1339  Last data filed at 12/5/2024 1100  Gross per 24 hour   Intake 2810.81 ml   Output 1186 ml   Net 1624.81 ml     Physical Exam  Constitutional:       General: She is not in acute distress.     Appearance: She is ill-appearing.   HENT:      Head: Normocephalic.      Mouth/Throat:      Mouth: Mucous membranes are moist.   Eyes:      Conjunctiva/sclera: Conjunctivae normal.      Pupils: Pupils are equal, round, and reactive to light.   Cardiovascular:      Rate and Rhythm: Normal rate and regular rhythm.   Pulmonary:      Effort: Pulmonary effort is normal. No respiratory distress.      Breath sounds: No wheezing or rales.   Abdominal:      General: There is no distension.      Palpations: Abdomen is soft.      Tenderness: There is no abdominal tenderness.   Musculoskeletal:         General: No swelling.   Skin:     General: Skin is warm.      Capillary Refill: Capillary refill takes less than 2 seconds.      Coloration: Skin is not jaundiced.      Findings: No bruising.   Neurological:      Mental Status: She is alert and oriented to person, place, and time.   Psychiatric:         Behavior: Behavior normal.      Review of Systems   Constitutional:  Negative for fatigue and fever.   Respiratory:  Negative for cough and shortness of breath.    Cardiovascular:  Negative for chest pain and leg swelling.   Gastrointestinal:  Negative for abdominal pain and nausea.   Genitourinary:  Negative for dysuria.   Musculoskeletal:   Positive for back pain.   Neurological:  Positive for weakness. Negative for dizziness and headaches.     Vents:  Oxygen Concentration (%): 28 (12/05/24 1327)    Lines/Drains/Airways       Drain  Duration                  Urethral Catheter 12/04/24 2012 Temperature probe 16 Fr. <1 day              Peripheral Intravenous Line  Duration                  Peripheral IV - Single Lumen 12/04/24 2016 20 G Anterior;Left;Proximal Forearm <1 day         Peripheral IV - Single Lumen 12/04/24 2017 20 G Anterior;Proximal;Right Forearm <1 day                  Significant Labs:    CBC/Anemia Profile:  Recent Labs   Lab 12/04/24 0515 12/04/24 1944 12/04/24 2026 12/05/24 0318   WBC 10.70  --  11.09 9.64   HGB 10.7*  --  10.7* 10.2*   HCT 35.2* 32* 34.3* 32.3*   *  --  139* 130*   MCV 87  --  85 85   RDW 15.2*  --  15.2* 15.2*     Chemistries:  Recent Labs   Lab 12/04/24 0515 12/04/24 2026 12/05/24 0318   * 130* 133*   K 4.4 4.8 4.8    104 107   CO2 20* 14* 17*   BUN 47* 47* 48*   CREATININE 1.5* 1.3 1.3   CALCIUM 8.7 8.5* 8.4*   ALBUMIN 2.5* 2.6* 2.4*   PROT 5.8* 6.0 5.6*   BILITOT 0.6 0.6 0.5   ALKPHOS 107 110 106   ALT 9* 14 14   AST 16 21 16   MG 2.2 2.1 2.2   PHOS 4.1  --  4.3     Significant Imaging:  I have reviewed all pertinent imaging results/findings within the past 24 hours.  I have reviewed and interpreted all pertinent imaging results/findings within the past 24 hours.

## 2024-12-05 NOTE — PROGRESS NOTES
[facility] - Intensive Care ([facility] Medicine  Progress Note    Patient Name: Katrina Duran  MRN: [id number]  Patient Class: IP- Inpatient   Admission Date: 12/3/2024  Length of Stay: 1 days  Attending Physician: Jim Araujo MD  Primary Care Provider: Liliya Freitas DO        Subjective     Principal Problem:Closed fracture of third lumbar vertebra        HPI:  Katrina Duran is a 84 y.o. female with PMHx of HTN, COPD, Lung Ca and CAD s/p CABG in 1999 and stent in 2006, brought to ER by EMS for severe lower back pain that radiates to her right leg since July of this year. Pt reports she is unable to stand and ambulate, like her legs are giving out upon standing and experiences excruciating pain when getting out of bed. Pt notes that prior to ED visit, she urinated at around 12:30 AM. She denies any numbness upon wiping. Pt's daughter reports she has not eaten in the last few days. Pt reports having a subjective fever but no measured oral temperature. She also c/o nausea. Pt denies any fall, cough, CP, SOB, dysuria, and abd pain.     Pt has hx lung cancer and received radiation in November of 2023. She had appointment with her PCP yesterday and was told her lung cancer has increased in size. Pt was prescribed 5 mg Percocet yesterday by her PCP to manage her pain. Pt was also seen in ED last Saturday. She has an upcoming appointment with pain management.      In the ER, VSS Afeb, labs showed WBC 12.4, H/H 11/35, Segs 92Na 134, HCo3 17, Bun/Cr  43/1.5. Lactate 1.8, BNP 1873. UA ++ve on 12/1/24.     CT L/S on 12/1-  subtle nondisplaced fracture involving the inferior, posterior and left lateral corner of the L3 vertebral body. Marked degenerative disc changes and degenerative facet arthropathy noted diffusely with marked convex left curvature of the lumbar spine.  Multilevel, multifactorial moderate to severe spinal canal stenosis and nerve root foramen narrowing, most significantly involving the L3/L4 disc  level.     3.  Aortic stent graft in place, with similar amount of mild indistinction surrounding the stent graft when compared to the prior study, of doubtful clinical significance.    CTA Chest Abdomen and Pelvis: 12/1/24  1.  Detrimental change.  Interval increase in angular opacity within the lingula, likely a combination of partial collapse with possible underlying mass.  Interval increase in size of semi-solid right lower lobe nodule with increase central solid component, currently measuring 4.2 cm.  Interval development of a 7 mm spiculated nodule in the right lung apex.  Interval development of at least 2 hepatic masses measuring up to 3.9 cm.  These changes must be considered neoplastic in origin until proven otherwise.  A repeat PET CT scan would be helpful.  4.  Negative for pulmonary embolism, aneurysm or dissection.  Negative for intraperitoneal free air, free fluid or hemoperitoneum.    Hosp Med consulted and pt admitted for acute intractable Lumbar pain sec to L3 vertebral fracture and metastatic Lung Ca with B lung mets as well as Liver mets. Pt also has UTI, NICKI and Dehydration. Elevated BNP likely sec to NICKI. Pt started on IV rocephin, NS, Norco and IV MS for pain control. Will consult XRT for possible L/S Palliative Radiation. NSGY was also contacted and did not consider it a neurosurgical case.         Overview/Hospital Course:    12/4/24  Admitted for back pain x 1 week  Pain not well controlled, adjusted PRN regimen  Found to have L3 fracture  RN reports patient retained 650 cc overnight, required straight cath  Noted urinary retention again this afternoon  Shelton catheter ordered  ---  Patient with history of abdominal aorta repair with hardware.  Per MRI department, no documentation at this time to investigate if obtaining MRI is safe at this time.  Patient reportedly had an MRI done at outside facility several months ago, medical records pending.  MRI needed to evaluate for possible  metastatic spread in setting of underlying lung cancer.  Patient wishes to transfer her care to [facility]. Heme/Onc following.    12/5/24  Reviewed overnight events, transferred to ICU due to lethargy, hypoxia (lowest O2 sat 77%).  Possibly aspirated vomitus. BNP elevated 1800. IV fluids were stopped  Started on IV antibiotics, currently on Zosyn.  Currently on 2L NC  Stable to return to floor  ---  MRI lumbar spine was [facility] on Oct 4th 2024 [facility] in [place], [place] - phone number [phone].   There office will send the report to fax number in ICU - [phone].  Updated MRI/Radiology staff via secure chat.  Updated patient's family members in waiting room.      Review of Systems   All other systems reviewed and are negative.    Objective:     Vital Signs (Most Recent):  Temp: 98.4 °F (36.9 °C) (12/05/24 1505)  Pulse: 68 (12/05/24 1505)  Resp: 14 (12/05/24 1505)  BP: (!) 145/66 (12/05/24 1505)  SpO2: 96 % (12/05/24 1505) Vital Signs (24h Range):  Temp:  [97.5 °F (36.4 °C)-101.7 °F (38.7 °C)] 98.4 °F (36.9 °C)  Pulse:  [] 68  Resp:  [5-29] 14  SpO2:  [55 %-100 %] 96 %  BP: ()/(45-88) 145/66     Weight: 79 kg (174 lb 2.6 oz)  Body mass index is 30.85 kg/m².    Intake/Output Summary (Last 24 hours) at 12/5/2024 1601  Last data filed at 12/5/2024 1500  Gross per 24 hour   Intake 3274.23 ml   Output 976 ml   Net 2298.23 ml         Physical Exam  Vitals and nursing note reviewed.   Constitutional:       General: She is not in acute distress.     Appearance: Normal appearance. She is normal weight. She is ill-appearing.   Cardiovascular:      Rate and Rhythm: Normal rate and regular rhythm.      Heart sounds: No murmur heard.  Pulmonary:      Effort: Pulmonary effort is normal. No respiratory distress.      Breath sounds: No wheezing.      Comments: Wearing 2 L NC  Genitourinary:     Comments: Shelton in place  Neurological:      Mental Status: She is alert.             Significant Labs: All pertinent  labs within the past 24 hours have been reviewed.  Recent Lab Results  (Last 5 results in the past 24 hours)        12/05/24  0318   12/04/24  2215   12/04/24  2147   12/04/24 2031 12/04/24 2028        Respiratory Infection Panel Source       NP swab         Adenovirus       Not Detected         Coronavirus 229E, Common Cold Virus       Not Detected         Coronavirus HKU1, Common Cold Virus       Not Detected         Coronavirus NL63, Common Cold Virus       Not Detected         Coronavirus OC43, Common Cold Virus       Not Detected  Comment: The Coronavirus strains detected in this test cause the common cold.  These strains are not the COVID-19 (novel Coronavirus)strain   associated with the respiratory disease outbreak.           Human Metapneumovirus       Not Detected         Human Rhinovirus/Enterovirus       Not Detected         Influenza A H1-2009       Not Detected         Influenza B       Not Detected         Parainfluenza Virus 1       Not Detected         Parainfluenza Virus 2       Not Detected         Parainfluenza Virus 3       Not Detected         Parainfluenza Virus 4       Not Detected         Respiratory Syncytial Virus       Not Detected         Bordetella Parapertussis ([id number])       Not Detected         Bordetella pertussis (ptxP)       Not Detected         Chlamydia pneumoniae       Not Detected         Mycoplasma pneumoniae       Not Detected  Comment: Respiratory Infection Panel testing performed by Multiplex PCR.         Albumin 2.4                              ALT 14               Anion Gap 9               AST 16               Baso # 0.01               Basophil % 0.1               BILIRUBIN TOTAL 0.5  Comment: For infants and newborns, interpretation of results should be based  on gestational age, weight and in agreement with clinical  observations.    Premature Infant recommended reference ranges:  Up to 24 hours.............<8.0 mg/dL  Up to 48 hours............<12.0  mg/dL  3-5 days..................<15.0 mg/dL  6-29 days.................<15.0 mg/dL                 Blood Culture, Routine     No Growth to date  [P]                No Growth to date  [P]           BUN 48               Calcium 8.4               Chloride 107               CO2 17               Creatinine 1.3               Urine Creatinine         86.0                86.0       Differential Method Automated               eGFR 40               Eos # 0.0               Eos % 0.4               Glucose 174               Gran # (ANC) 9.2               Gran % 95.6               Hematocrit 32.3               Hemoglobin 10.2               Immature Grans (Abs) 0.05  Comment: Mild elevation in immature granulocytes is non specific and   can be seen in a variety of conditions including stress response,   acute inflammation, trauma and pregnancy. Correlation with other   laboratory and clinical findings is essential.                 Immature Granulocytes 0.5               Lymph # 0.1               Lymph % 1.2               Magnesium  2.2               MCH 26.7               MCHC 31.6               MCV 85               MICROALB/CREAT RATIO         40.7       Urine Microalbumin         35.0       Mono # 0.2               Mono % 2.2               MPV 10.5               nRBC 0               QRS Duration   104             OHS QTC Calculation   416             Phosphorus Level 4.3               Platelet Count 130               Potassium 4.8               PROTEIN TOTAL 5.6               RBC 3.82               RDW 15.2               SARS-CoV2 (COVID-19) Qualitative PCR       Not Detected         Sodium 133               Sodium, Urine         53  Comment: The random urine reference ranges provided were established   for 24 hour urine collections.  No reference ranges exist for  random urine specimens.  Correlate clinically.         WBC 9.64                                       [P] - Preliminary Result               Significant Imaging: I  have reviewed all pertinent imaging results/findings within the past 24 hours.    X-Ray Chest 1 View   Final Result      Stable chest.         Electronically signed by: Boni Cr MD   Date:    12/04/2024   Time:    20:14      X-Ray Chest AP Portable   Final Result      Worsening left-sided airspace opacity in comparison to the prior radiograph.  Underlying mass not excluded.         Electronically signed by: Cam Obrien   Date:    12/03/2024   Time:    13:08      MRI Cervical Spine W WO Cont    (Results Pending)   MRI Lumbar Spine W WO Cont    (Results Pending)   MRI Thoracic Spine W WO Cont    (Results Pending)         Assessment and Plan     * Closed fracture of third lumbar vertebra  Multimodal pain control  PT/OT  NSGY and Oncology consult  Prognosis poor on account of underlying Lung Ca    12/4/24  MRI spine ordered to evaluate for metastatic disease  Unable to obtain due to concerns for MRI safety  Medical records pending, patient reports undergoing MRI previously    12/5/24  MRI lumbar spine was done on Oct 4th 2024 at [facility] in [place], [place] - phone number [phone].   There office will send the report to fax number in icu - [phone].  Updated MRI/Radiology staff via secure chat.    Pneumonia of left lung due to infectious organism  Patient has a diagnosis of pneumonia. The cause of the pneumonia is suspected to be bacterial in etiology but organism is not known. The pneumonia is worsening due to O2 requirement . The patient has the following signs/symptoms of pneumonia: persistent hypoxia . The patient does have a current oxygen requirement and the patient does not have a home oxygen requirement. I have reviewed the pertinent imaging. The following cultures have been collected: Blood cultures The culture results are listed below.     Current antimicrobial regimen consists of the antibiotics listed below. Will monitor patient closely and continue current treatment plan  unchanged.    Antibiotics (From admission, onward)      Start     Stop Route Frequency Ordered    12/05/24 1230  piperacillin-tazobactam (ZOSYN) 4.5 g in D5W 100 mL IVPB (MB+)         -- IV Every 8 hours (non-standard times) 12/05/24 0939    12/05/24 1045  mupirocin 2 % ointment         12/10/24 0859 Nasl 2 times daily 12/05/24 0941            Microbiology Results (last 7 days)       Procedure Component Value Units Date/Time    Blood culture [[id number]] Collected: 12/04/24 2147    Order Status: Completed Specimen: Blood Updated: 12/05/24 0915     Blood Culture, Routine No Growth to date    Narrative:      #2    Blood culture [[id number]] Collected: 12/04/24 2147    Order Status: Completed Specimen: Blood Updated: 12/05/24 0915     Blood Culture, Routine No Growth to date    Culture, MRSA [[id number]] Collected: 12/04/24 2029    Order Status: Sent Specimen: MRSA source from Nares, Left Updated: 12/05/24 0137    Respiratory Infection Panel (PCR), Nasopharyngeal [[id number]] Collected: 12/04/24 2031    Order Status: Completed Specimen: Nasopharyngeal Swab Updated: 12/05/24 0014     Respiratory Infection Panel Source NP swab     Adenovirus Not Detected     Coronavirus 229E, Common Cold Virus Not Detected     Coronavirus HKU1, Common Cold Virus Not Detected     Coronavirus NL63, Common Cold Virus Not Detected     Coronavirus OC43, Common Cold Virus Not Detected     Comment: The Coronavirus strains detected in this test cause the common cold.  These strains are not the COVID-19 (novel Coronavirus)strain   associated with the respiratory disease outbreak.          SARS-CoV2 (COVID-19) Qualitative PCR Not Detected     Human Metapneumovirus Not Detected     Human Rhinovirus/Enterovirus Not Detected     Influenza A (subtypes H1, H1-2009,H3) Not Detected     Influenza B Not Detected     Parainfluenza Virus 1 Not Detected     Parainfluenza Virus 2 Not Detected     Parainfluenza Virus 3 Not Detected     Parainfluenza Virus 4  Not Detected     Respiratory Syncytial Virus Not Detected     Bordetella Parapertussis (LY4412) Not Detected     Bordetella pertussis (ptxP) Not Detected     Chlamydia pneumoniae Not Detected     Mycoplasma pneumoniae Not Detected     Comment: Respiratory Infection Panel testing performed by Multiplex PCR.       Narrative:      Assay not valid for lower respiratory specimens, alternate  testing required.    Culture, Respiratory with Gram Stain [[id number]] Collected: 12/04/24 2029    Order Status: Sent Specimen: Respiratory from Endotracheal Aspirate Updated: 12/04/24 2029            UTI (urinary tract infection)  As seen on UA 12/1- asymptomatic, no obvious dysuria sec to severe LBP  Will Treat with IVF and IV Rocephin  Urine Cx from 12/1- Neg    12/4/24  Ceftriaxone day # 2  Shelton placed due to urinary retention    12/5/24  Urine culture no growth  Ceftriaxone stopped      NICKI (acute kidney injury)  NICKI is likely due to pre-renal azotemia due to dehydration. Baseline creatinine is  1.1 . Most recent creatinine and eGFR are listed below.  Recent Labs     12/04/24  0515 12/04/24 2026 12/05/24  0318   CREATININE 1.5* 1.3 1.3   EGFRNORACEVR 34* 40* 40*        Plan  - NICKI is improving  - Avoid nephrotoxins and renally dose meds for GFR listed above  - Monitor urine output, serial BMP, and adjust therapy as needed    Metastatic lung cancer (metastasis from lung to other site)  Patient has Known metastatic cancer of Lung primary. The cancer has metastasized to Liver, opposite Lung and possibly Lumbar Spine. The patient is not under the care of an outpatient oncologist. The patient is not undergoing active chemotherapy. Their staging information is listed below.     Oncology following  Multimodal pain control        VTE Risk Mitigation (From admission, onward)           Ordered     heparin (porcine) injection 5,000 Units  Every 8 hours         12/03/24 1837     IP VTE HIGH RISK PATIENT  Once         12/03/24 1837      Place sequential compression device  Until discontinued         12/03/24 9306                    Discharge Planning   LACIE:      Code Status: DNR   Medical Readiness for Discharge Date:   Discharge Plan A: Home with family              Jim Araujo MD  Department of Hospital Medicine   [facility] - Intensive Care ([facility])

## 2024-12-06 PROBLEM — R33.9 URINARY RETENTION: Status: ACTIVE | Noted: 2024-12-06

## 2024-12-06 LAB
ALBUMIN SERPL BCP-MCNC: 2.4 G/DL (ref 3.5–5.2)
ALP SERPL-CCNC: 90 U/L (ref 40–150)
ALT SERPL W/O P-5'-P-CCNC: 17 U/L (ref 10–44)
ANION GAP SERPL CALC-SCNC: 8 MMOL/L (ref 8–16)
AST SERPL-CCNC: 18 U/L (ref 10–40)
BASOPHILS # BLD AUTO: 0.02 K/UL (ref 0–0.2)
BASOPHILS NFR BLD: 0.2 % (ref 0–1.9)
BILIRUB SERPL-MCNC: 0.5 MG/DL (ref 0.1–1)
BUN SERPL-MCNC: 56 MG/DL (ref 8–23)
CALCIUM SERPL-MCNC: 8.7 MG/DL (ref 8.7–10.5)
CHLORIDE SERPL-SCNC: 105 MMOL/L (ref 95–110)
CO2 SERPL-SCNC: 21 MMOL/L (ref 23–29)
CREAT SERPL-MCNC: 1.4 MG/DL (ref 0.5–1.4)
DIFFERENTIAL METHOD BLD: ABNORMAL
EOSINOPHIL # BLD AUTO: 0 K/UL (ref 0–0.5)
EOSINOPHIL NFR BLD: 0 % (ref 0–8)
ERYTHROCYTE [DISTWIDTH] IN BLOOD BY AUTOMATED COUNT: 15 % (ref 11.5–14.5)
EST. GFR  (NO RACE VARIABLE): 37 ML/MIN/1.73 M^2
GLUCOSE SERPL-MCNC: 136 MG/DL (ref 70–110)
HCT VFR BLD AUTO: 30 % (ref 37–48.5)
HGB BLD-MCNC: 9.5 G/DL (ref 12–16)
IMM GRANULOCYTES # BLD AUTO: 0.09 K/UL (ref 0–0.04)
IMM GRANULOCYTES NFR BLD AUTO: 0.8 % (ref 0–0.5)
LYMPHOCYTES # BLD AUTO: 0.3 K/UL (ref 1–4.8)
LYMPHOCYTES NFR BLD: 3.1 % (ref 18–48)
MAGNESIUM SERPL-MCNC: 2.4 MG/DL (ref 1.6–2.6)
MCH RBC QN AUTO: 26.5 PG (ref 27–31)
MCHC RBC AUTO-ENTMCNC: 31.7 G/DL (ref 32–36)
MCV RBC AUTO: 84 FL (ref 82–98)
MONOCYTES # BLD AUTO: 0.6 K/UL (ref 0.3–1)
MONOCYTES NFR BLD: 5.4 % (ref 4–15)
MRSA SPEC QL CULT: NORMAL
NEUTROPHILS # BLD AUTO: 9.7 K/UL (ref 1.8–7.7)
NEUTROPHILS NFR BLD: 90.5 % (ref 38–73)
NRBC BLD-RTO: 0 /100 WBC
PHOSPHATE SERPL-MCNC: 3 MG/DL (ref 2.7–4.5)
PLATELET # BLD AUTO: 147 K/UL (ref 150–450)
PMV BLD AUTO: 10.6 FL (ref 9.2–12.9)
POTASSIUM SERPL-SCNC: 4.4 MMOL/L (ref 3.5–5.1)
PROT SERPL-MCNC: 5.4 G/DL (ref 6–8.4)
RBC # BLD AUTO: 3.59 M/UL (ref 4–5.4)
SODIUM SERPL-SCNC: 134 MMOL/L (ref 136–145)
WBC # BLD AUTO: 10.72 K/UL (ref 3.9–12.7)

## 2024-12-06 PROCEDURE — 25000242 PHARM REV CODE 250 ALT 637 W/ HCPCS

## 2024-12-06 PROCEDURE — 63600175 PHARM REV CODE 636 W HCPCS: Performed by: EMERGENCY MEDICINE

## 2024-12-06 PROCEDURE — 25000003 PHARM REV CODE 250

## 2024-12-06 PROCEDURE — 25000003 PHARM REV CODE 250: Performed by: INTERNAL MEDICINE

## 2024-12-06 PROCEDURE — A4216 STERILE WATER/SALINE, 10 ML: HCPCS | Performed by: EMERGENCY MEDICINE

## 2024-12-06 PROCEDURE — 80053 COMPREHEN METABOLIC PANEL: CPT | Performed by: EMERGENCY MEDICINE

## 2024-12-06 PROCEDURE — 94799 UNLISTED PULMONARY SVC/PX: CPT

## 2024-12-06 PROCEDURE — 63600175 PHARM REV CODE 636 W HCPCS

## 2024-12-06 PROCEDURE — 36415 COLL VENOUS BLD VENIPUNCTURE: CPT | Performed by: EMERGENCY MEDICINE

## 2024-12-06 PROCEDURE — 99900035 HC TECH TIME PER 15 MIN (STAT)

## 2024-12-06 PROCEDURE — 94761 N-INVAS EAR/PLS OXIMETRY MLT: CPT

## 2024-12-06 PROCEDURE — 21400001 HC TELEMETRY ROOM

## 2024-12-06 PROCEDURE — 25000003 PHARM REV CODE 250: Performed by: EMERGENCY MEDICINE

## 2024-12-06 PROCEDURE — 84100 ASSAY OF PHOSPHORUS: CPT | Performed by: EMERGENCY MEDICINE

## 2024-12-06 PROCEDURE — 25500020 PHARM REV CODE 255: Performed by: HOSPITALIST

## 2024-12-06 PROCEDURE — 94640 AIRWAY INHALATION TREATMENT: CPT

## 2024-12-06 PROCEDURE — 85025 COMPLETE CBC W/AUTO DIFF WBC: CPT | Performed by: EMERGENCY MEDICINE

## 2024-12-06 PROCEDURE — 63600175 PHARM REV CODE 636 W HCPCS: Performed by: HOSPITALIST

## 2024-12-06 PROCEDURE — A9585 GADOBUTROL INJECTION: HCPCS | Performed by: HOSPITALIST

## 2024-12-06 PROCEDURE — 83735 ASSAY OF MAGNESIUM: CPT | Performed by: EMERGENCY MEDICINE

## 2024-12-06 PROCEDURE — 25000003 PHARM REV CODE 250: Performed by: HOSPITALIST

## 2024-12-06 PROCEDURE — 92526 ORAL FUNCTION THERAPY: CPT

## 2024-12-06 RX ORDER — AMOXICILLIN AND CLAVULANATE POTASSIUM 500; 125 MG/1; MG/1
1 TABLET, FILM COATED ORAL 2 TIMES DAILY
Status: DISCONTINUED | OUTPATIENT
Start: 2024-12-06 | End: 2024-12-07

## 2024-12-06 RX ORDER — GADOBUTROL 604.72 MG/ML
10 INJECTION INTRAVENOUS
Status: COMPLETED | OUTPATIENT
Start: 2024-12-06 | End: 2024-12-06

## 2024-12-06 RX ORDER — MORPHINE SULFATE 2 MG/ML
2 INJECTION, SOLUTION INTRAMUSCULAR; INTRAVENOUS ONCE
Status: DISCONTINUED | OUTPATIENT
Start: 2024-12-06 | End: 2024-12-10 | Stop reason: HOSPADM

## 2024-12-06 RX ADMIN — Medication 10 ML: at 06:12

## 2024-12-06 RX ADMIN — PIPERACILLIN AND TAZOBACTAM 4.5 G: 4; .5 INJECTION, POWDER, LYOPHILIZED, FOR SOLUTION INTRAVENOUS; PARENTERAL at 04:12

## 2024-12-06 RX ADMIN — GABAPENTIN 100 MG: 100 CAPSULE ORAL at 09:12

## 2024-12-06 RX ADMIN — ARFORMOTEROL TARTRATE 15 MCG: 15 SOLUTION RESPIRATORY (INHALATION) at 07:12

## 2024-12-06 RX ADMIN — MUPIROCIN: 20 OINTMENT TOPICAL at 09:12

## 2024-12-06 RX ADMIN — GABAPENTIN 100 MG: 100 CAPSULE ORAL at 08:12

## 2024-12-06 RX ADMIN — HYDROCODONE BITARTRATE AND ACETAMINOPHEN 1 TABLET: 10; 325 TABLET ORAL at 04:12

## 2024-12-06 RX ADMIN — BUDESONIDE INHALATION 0.5 MG: 0.5 SUSPENSION RESPIRATORY (INHALATION) at 07:12

## 2024-12-06 RX ADMIN — GADOBUTROL 8 ML: 604.72 INJECTION INTRAVENOUS at 05:12

## 2024-12-06 RX ADMIN — LORAZEPAM 1 MG: 2 INJECTION INTRAMUSCULAR; INTRAVENOUS at 04:12

## 2024-12-06 RX ADMIN — Medication 10 ML: at 09:12

## 2024-12-06 RX ADMIN — HEPARIN SODIUM 5000 UNITS: 5000 INJECTION INTRAVENOUS; SUBCUTANEOUS at 06:12

## 2024-12-06 RX ADMIN — ATORVASTATIN CALCIUM 40 MG: 40 TABLET, FILM COATED ORAL at 08:12

## 2024-12-06 RX ADMIN — IPRATROPIUM BROMIDE AND ALBUTEROL SULFATE 3 ML: 2.5; .5 SOLUTION RESPIRATORY (INHALATION) at 07:12

## 2024-12-06 RX ADMIN — Medication 10 ML: at 02:12

## 2024-12-06 RX ADMIN — Medication 6 MG: at 12:12

## 2024-12-06 RX ADMIN — MUPIROCIN: 20 OINTMENT TOPICAL at 08:12

## 2024-12-06 RX ADMIN — HEPARIN SODIUM 5000 UNITS: 5000 INJECTION INTRAVENOUS; SUBCUTANEOUS at 09:12

## 2024-12-06 RX ADMIN — HEPARIN SODIUM 5000 UNITS: 5000 INJECTION INTRAVENOUS; SUBCUTANEOUS at 02:12

## 2024-12-06 RX ADMIN — SENNOSIDES AND DOCUSATE SODIUM 1 TABLET: 50; 8.6 TABLET ORAL at 08:12

## 2024-12-06 RX ADMIN — PANTOPRAZOLE SODIUM 40 MG: 40 TABLET, DELAYED RELEASE ORAL at 08:12

## 2024-12-06 RX ADMIN — PIPERACILLIN AND TAZOBACTAM 4.5 G: 4; .5 INJECTION, POWDER, LYOPHILIZED, FOR SOLUTION INTRAVENOUS; PARENTERAL at 02:12

## 2024-12-06 RX ADMIN — GABAPENTIN 100 MG: 100 CAPSULE ORAL at 02:12

## 2024-12-06 RX ADMIN — HYDROCODONE BITARTRATE AND ACETAMINOPHEN 1 TABLET: 10; 325 TABLET ORAL at 03:12

## 2024-12-06 RX ADMIN — SERTRALINE HYDROCHLORIDE 100 MG: 50 TABLET ORAL at 09:12

## 2024-12-06 RX ADMIN — AMOXICILLIN AND CLAVULANATE POTASSIUM 500 MG: 500; 125 TABLET, FILM COATED ORAL at 09:12

## 2024-12-06 RX ADMIN — POLYETHYLENE GLYCOL 3350 17 G: 17 POWDER, FOR SOLUTION ORAL at 08:12

## 2024-12-06 NOTE — ASSESSMENT & PLAN NOTE
Urinary retention noted soon after admission  Bladder scan x 2 with high residuals  UA with evidence of infectious etiology  Possibly related to spinal fracture, MRI pending  Consider voiding trial over weekend

## 2024-12-06 NOTE — ASSESSMENT & PLAN NOTE
Patient has Known metastatic cancer of Lung primary. The cancer has metastasized to Liver, opposite Lung and possibly Lumbar Spine. The patient is not under the care of an outpatient oncologist. The patient is not undergoing active chemotherapy. Their staging information is listed below.     Oncology following, recc MRI spine  Multimodal pain control

## 2024-12-06 NOTE — PLAN OF CARE
Problem: Adult Inpatient Plan of Care  Goal: Plan of Care Review  Outcome: Progressing  Goal: Patient-Specific Goal (Individualized)  Outcome: Progressing  Goal: Absence of Hospital-Acquired Illness or Injury  Outcome: Progressing  Goal: Optimal Comfort and Wellbeing  Outcome: Progressing  Goal: Readiness for Transition of Care  Outcome: Progressing     Problem: Pneumonia  Goal: Fluid Balance  Outcome: Progressing  Goal: Resolution of Infection Signs and Symptoms  Outcome: Progressing  Goal: Effective Oxygenation and Ventilation  Outcome: Progressing     Problem: Fall Injury Risk  Goal: Absence of Fall and Fall-Related Injury  Outcome: Progressing     Problem: Skin Injury Risk Increased  Goal: Skin Health and Integrity  Outcome: Progressing     Problem: Acute Kidney Injury/Impairment  Goal: Fluid and Electrolyte Balance  Outcome: Progressing  Goal: Improved Oral Intake  Outcome: Progressing  Goal: Effective Renal Function  Outcome: Progressing     Problem: Infection  Goal: Absence of Infection Signs and Symptoms  Outcome: Progressing     Problem: Pain Acute  Goal: Optimal Pain Control and Function  Outcome: Progressing     Problem: Pain Chronic (Persistent)  Goal: Optimal Pain Control and Function  Outcome: Progressing

## 2024-12-06 NOTE — PLAN OF CARE
Discussed poc with pt, pt verbalized understanding    Purposeful rounding every 2hours    VS wnl  Cardiac monitoring in use, pt is NSR, tele monitor # [id number]  Fall precautions in place, remains injury free  Pt denies c/o nausea  Pain  under control with PRN meds      Accurate I&Os  Bed locked at lowest position  Call light within reach    Chart check complete  Will cont with POC

## 2024-12-06 NOTE — CHAPLAIN
Initial visit with patient and family.  Visited with patient and family to determine ways that I might be of best support to them.  Pt and her family were doing okay at the time of my visit but pt did ask for a prayer of blessing.  I took time to do this for her and spiritual care remains available as needed.    Chaplain Felipe Sheikh M.Div., BCC

## 2024-12-06 NOTE — PT/OT/SLP PROGRESS
Physical Therapy      Patient Name:  Katrina Duran   MRN:  [id number]    Chart review complete. Spoke with nurse, July. Patient not seen today secondary to Nurse hold until imaging complete. Will continue efforts.    Marlyn Ramos, PT, DPT  12/6/2024   1400

## 2024-12-06 NOTE — PT/OT/SLP PROGRESS
Occupational Therapy      Patient Name:  Katrina Duran   MRN:  [id number]    Patient not seen today secondary to Nurse/ EDDI hold (awaiting for imaging). Will follow-up on later date  Aranza Umanzor OT.  1352  12/6/2024

## 2024-12-06 NOTE — PLAN OF CARE
Discussed poc with pt, pt verbalized understanding    Purposeful rounding every 2hours    VS wnl  Cardiac monitoring in use, pt is NSR, tele monitor #[id number]    Fall precautions in place, remains injury free  Pt denies c/o pain    IVFs saline locked  Accurate I&Os  Abx given as prescribed  Bed locked at lowest position  Call light within reach    Chart check complete  Will cont with POC

## 2024-12-06 NOTE — PROGRESS NOTES
[facility] Medicine  Progress Note    Patient Name: Katrina Duran  MRN: [id number]  Patient Class: IP- Inpatient   Admission Date: 12/3/2024  Length of Stay: 2 days  Attending Physician: Jim Araujo MD  Primary Care Provider: Liliya Freitas DO        Subjective     Principal Problem:Closed fracture of third lumbar vertebra        HPI:  Katrina Duran is a 84 y.o. female with PMHx of HTN, COPD, Lung Ca and CAD s/p CABG in 1999 and stent in 2006, brought to ER by EMS for severe lower back pain that radiates to her right leg since July of this year. Pt reports she is unable to stand and ambulate, like her legs are giving out upon standing and experiences excruciating pain when getting out of bed. Pt notes that prior to ED visit, she urinated at around 12:30 AM. She denies any numbness upon wiping. Pt's daughter reports she has not eaten in the last few days. Pt reports having a subjective fever but no measured oral temperature. She also c/o nausea. Pt denies any fall, cough, CP, SOB, dysuria, and abd pain.     Pt has hx lung cancer and received radiation in November of 2023. She had appointment with her PCP yesterday and was told her lung cancer has increased in size. Pt was prescribed 5 mg Percocet yesterday by her PCP to manage her pain. Pt was also seen in ED last Saturday. She has an upcoming appointment with pain management.      In the ER, VSS Afeb, labs showed WBC 12.4, H/H 11/35, Segs 92Na 134, HCo3 17, Bun/Cr  43/1.5. Lactate 1.8, BNP 1873. UA ++ve on 12/1/24.     CT L/S on 12/1-  subtle nondisplaced fracture involving the inferior, posterior and left lateral corner of the L3 vertebral body. Marked degenerative disc changes and degenerative facet arthropathy noted diffusely with marked convex left curvature of the lumbar spine.  Multilevel, multifactorial moderate to severe spinal canal stenosis and nerve root foramen narrowing, most significantly involving the L3/L4 disc level.     3.   Aortic stent graft in place, with similar amount of mild indistinction surrounding the stent graft when compared to the prior study, of doubtful clinical significance.    CTA Chest Abdomen and Pelvis: 12/1/24  1.  Detrimental change.  Interval increase in angular opacity within the lingula, likely a combination of partial collapse with possible underlying mass.  Interval increase in size of semi-solid right lower lobe nodule with increase central solid component, currently measuring 4.2 cm.  Interval development of a 7 mm spiculated nodule in the right lung apex.  Interval development of at least 2 hepatic masses measuring up to 3.9 cm.  These changes must be considered neoplastic in origin until proven otherwise.  A repeat PET CT scan would be helpful.  4.  Negative for pulmonary embolism, aneurysm or dissection.  Negative for intraperitoneal free air, free fluid or hemoperitoneum.    Hosp Med consulted and pt admitted for acute intractable Lumbar pain sec to L3 vertebral fracture and metastatic Lung Ca with B lung mets as well as Liver mets. Pt also has UTI, NICKI and Dehydration. Elevated BNP likely sec to NICKI. Pt started on IV rocephin, NS, Norco and IV MS for pain control. Will consult XRT for possible L/S Palliative Radiation. NSGY was also contacted and did not consider it a neurosurgical case.         Overview/Hospital Course:    12/4/24  Admitted for back pain x 1 week  Pain not well controlled, adjusted PRN regimen  Found to have L3 fracture  RN reports patient retained 650 cc overnight, required straight cath  Noted urinary retention again this afternoon  Shelton catheter ordered  ---  Patient with history of abdominal aorta repair with hardware.  Per MRI department, no documentation at this time to investigate if obtaining MRI is safe at this time.  Patient reportedly had an MRI done at outside facility several months ago, medical records pending.  MRI needed to evaluate for possible metastatic spread in  setting of underlying lung cancer.  Patient wishes to transfer her care to [facility]. Heme/Onc following.    12/5/24  Reviewed overnight events, transferred to ICU due to lethargy, hypoxia (lowest O2 sat 77%).  Possibly aspirated vomitus. BNP elevated 1800. IV fluids were stopped  Started on IV antibiotics, currently on Zosyn.  Currently on 2L NC  Stable to return to floor  ---  MRI lumbar spine was done on Oct 4th 2024 at [facility] in [place], [place] - phone number [phone].   There office will send the report to fax number in ICU - [phone].  Updated MRI/Radiology staff via secure chat.  Updated patient's family members in waiting room.    12/6/24  NAEON, patient states no pain when laying still  Severe pain with movement, controlled with current PRN regimen  Plans for MRI this evening  Currently no Neurosurgical coverage at this time  Discussed possibly transferring to [facility], pending MRI findings  Updated patient's family at bedside      Review of Systems   All other systems reviewed and are negative.    Objective:     Vital Signs (Most Recent):  Temp: 97.6 °F (36.4 °C) (12/06/24 1239)  Pulse: 80 (12/06/24 1500)  Resp: 18 (12/06/24 1518)  BP: (!) 128/58 (12/06/24 1239)  SpO2: (!) 90 % (12/06/24 1239) Vital Signs (24h Range):  Temp:  [97.5 °F (36.4 °C)-98.3 °F (36.8 °C)] 97.6 °F (36.4 °C)  Pulse:  [62-80] 80  Resp:  [15-20] 18  SpO2:  [89 %-92 %] 90 %  BP: (107-156)/(53-65) 128/58     Weight: 81.5 kg (179 lb 10.8 oz)  Body mass index is 31.83 kg/m².    Intake/Output Summary (Last 24 hours) at 12/6/2024 1621  Last data filed at 12/6/2024 0624  Gross per 24 hour   Intake 391.67 ml   Output 310 ml   Net 81.67 ml         Physical Exam  Vitals and nursing note reviewed.   Constitutional:       General: She is not in acute distress.     Appearance: Normal appearance. She is normal weight. She is ill-appearing.   Cardiovascular:      Rate and Rhythm: Normal rate and regular rhythm.      Heart sounds: No  murmur heard.  Pulmonary:      Effort: Pulmonary effort is normal. No respiratory distress.      Breath sounds: No wheezing.      Comments: Wearing 2 L NC  Genitourinary:     Comments: Shelton in place  Neurological:      Mental Status: She is alert.             Significant Labs: All pertinent labs within the past 24 hours have been reviewed.  Recent Lab Results         12/06/24  0708        Albumin 2.4       ALP 90       ALT 17       Anion Gap 8       AST 18       Baso # 0.02       Basophil % 0.2       BILIRUBIN TOTAL 0.5  Comment: For infants and newborns, interpretation of results should be based  on gestational age, weight and in agreement with clinical  observations.    Premature Infant recommended reference ranges:  Up to 24 hours.............<8.0 mg/dL  Up to 48 hours............<12.0 mg/dL  3-5 days..................<15.0 mg/dL  6-29 days.................<15.0 mg/dL         BUN 56       Calcium 8.7       Chloride 105       CO2 21       Creatinine 1.4       Differential Method Automated       eGFR 37       Eos # 0.0       Eos % 0.0       Glucose 136       Gran # (ANC) 9.7       Gran % 90.5       Hematocrit 30.0       Hemoglobin 9.5       Immature Grans (Abs) 0.09  Comment: Mild elevation in immature granulocytes is non specific and   can be seen in a variety of conditions including stress response,   acute inflammation, trauma and pregnancy. Correlation with other   laboratory and clinical findings is essential.         Immature Granulocytes 0.8       Lymph # 0.3       Lymph % 3.1       Magnesium  2.4       MCH 26.5       MCHC 31.7       MCV 84       Mono # 0.6       Mono % 5.4       MPV 10.6       nRBC 0       Phosphorus Level 3.0       Platelet Count 147       Potassium 4.4       PROTEIN TOTAL 5.4       RBC 3.59       RDW 15.0       Sodium 134       WBC 10.72               Significant Imaging: I have reviewed all pertinent imaging results/findings within the past 24 hours.    X-Ray Chest 1 View   Final Result       Stable chest.         Electronically signed by: Boni Cr MD   Date:    12/04/2024   Time:    20:14      X-Ray Chest AP Portable   Final Result      Worsening left-sided airspace opacity in comparison to the prior radiograph.  Underlying mass not excluded.         Electronically signed by: Cam Obrien   Date:    12/03/2024   Time:    13:08      MRI Cervical Spine W WO Cont    (Results Pending)   MRI Lumbar Spine W WO Cont    (Results Pending)   MRI Thoracic Spine W WO Cont    (Results Pending)         Assessment and Plan     * Closed fracture of third lumbar vertebra  Multimodal pain control  PT/OT  NSGY and Oncology consult  Prognosis poor on account of underlying Lung Ca    12/4/24  MRI spine ordered to evaluate for metastatic disease  Unable to obtain due to concerns for MRI safety  Medical records pending, patient reports undergoing MRI previously    12/5/24  MRI lumbar spine was done on Oct 4th 2024 at [facility] in [place], [place] - phone number [phone].   There office will send the report to fax number in icu - [phone].  Updated MRI/Radiology staff via secure chat.    Pneumonia of left lung due to infectious organism  Patient has a diagnosis of pneumonia. The cause of the pneumonia is suspected to be bacterial in etiology but organism is not known. The pneumonia is worsening due to O2 requirement . The patient has the following signs/symptoms of pneumonia: persistent hypoxia . The patient does have a current oxygen requirement and the patient does not have a home oxygen requirement. I have reviewed the pertinent imaging. The following cultures have been collected: Blood cultures The culture results are listed below.     Current antimicrobial regimen consists of the antibiotics listed below. Will monitor patient closely and continue current treatment plan unchanged.    Antibiotics (From admission, onward)      Start     Stop Route Frequency Ordered    12/05/24 5520   piperacillin-tazobactam (ZOSYN) 4.5 g in D5W 100 mL IVPB (MB+)         -- IV Every 8 hours (non-standard times) 12/05/24 0939    12/05/24 1045  mupirocin 2 % ointment         12/10/24 0859 Nasl 2 times daily 12/05/24 0941            Microbiology Results (last 7 days)       Procedure Component Value Units Date/Time    Blood culture [[id number]] Collected: 12/04/24 2147    Order Status: Completed Specimen: Blood Updated: 12/05/24 0915     Blood Culture, Routine No Growth to date    Narrative:      #2    Blood culture [[id number]] Collected: 12/04/24 2147    Order Status: Completed Specimen: Blood Updated: 12/05/24 0915     Blood Culture, Routine No Growth to date    Culture, MRSA [[id number]] Collected: 12/04/24 2029    Order Status: Sent Specimen: MRSA source from Nares, Left Updated: 12/05/24 0137    Respiratory Infection Panel (PCR), Nasopharyngeal [[id number]] Collected: 12/04/24 2031    Order Status: Completed Specimen: Nasopharyngeal Swab Updated: 12/05/24 0014     Respiratory Infection Panel Source NP swab     Adenovirus Not Detected     Coronavirus 229E, Common Cold Virus Not Detected     Coronavirus HKU1, Common Cold Virus Not Detected     Coronavirus NL63, Common Cold Virus Not Detected     Coronavirus OC43, Common Cold Virus Not Detected     Comment: The Coronavirus strains detected in this test cause the common cold.  These strains are not the COVID-19 (novel Coronavirus)strain   associated with the respiratory disease outbreak.          SARS-CoV2 (COVID-19) Qualitative PCR Not Detected     Human Metapneumovirus Not Detected     Human Rhinovirus/Enterovirus Not Detected     Influenza A (subtypes H1, H1-2009,H3) Not Detected     Influenza B Not Detected     Parainfluenza Virus 1 Not Detected     Parainfluenza Virus 2 Not Detected     Parainfluenza Virus 3 Not Detected     Parainfluenza Virus 4 Not Detected     Respiratory Syncytial Virus Not Detected     Bordetella Parapertussis (IX0553) Not Detected      Bordetella pertussis (ptxP) Not Detected     Chlamydia pneumoniae Not Detected     Mycoplasma pneumoniae Not Detected     Comment: Respiratory Infection Panel testing performed by Multiplex PCR.       Narrative:      Assay not valid for lower respiratory specimens, alternate  testing required.    Culture, Respiratory with Gram Stain [[id number]] Collected: 12/04/24 2029    Order Status: Sent Specimen: Respiratory from Endotracheal Aspirate Updated: 12/04/24 2029            Urinary retention  Urinary retention noted soon after admission  Bladder scan x 2 with high residuals  UA with evidence of infectious etiology  Possibly related to spinal fracture, MRI pending  Consider voiding trial over weekend      UTI (urinary tract infection)  As seen on UA 12/1- asymptomatic, no obvious dysuria sec to severe LBP  Will Treat with IVF and IV Rocephin  Urine Cx from 12/1- Neg    12/4/24  Ceftriaxone day # 2  Shelton placed due to urinary retention    12/5/24  Urine culture no growth  Ceftriaxone stopped      NICKI (acute kidney injury)  NICKI is likely due to pre-renal azotemia due to dehydration. Baseline creatinine is  1.1 . Most recent creatinine and eGFR are listed below.  Recent Labs     12/04/24  0515 12/04/24 2026 12/05/24  0318   CREATININE 1.5* 1.3 1.3   EGFRNORACEVR 34* 40* 40*        Plan  - NICKI is improving  - Avoid nephrotoxins and renally dose meds for GFR listed above  - Monitor urine output, serial BMP, and adjust therapy as needed    Metastatic lung cancer (metastasis from lung to other site)  Patient has Known metastatic cancer of Lung primary. The cancer has metastasized to Liver, opposite Lung and possibly Lumbar Spine. The patient is not under the care of an outpatient oncologist. The patient is not undergoing active chemotherapy. Their staging information is listed below.     Oncology following, [facility] MRI spine  Multimodal pain control        VTE Risk Mitigation (From admission, onward)           Ordered      heparin (porcine) injection 5,000 Units  Every 8 hours         12/03/24 1837     IP VTE HIGH RISK PATIENT  Once         12/03/24 1837     Place sequential compression device  Until discontinued         12/03/24 1837                    Discharge Planning   LACIE:      Code Status: DNR   Medical Readiness for Discharge Date:   Discharge Plan A: Home with family                Please place Justification for DME        Jim Araujo MD  Department of Hospital Medicine   '[facility]

## 2024-12-06 NOTE — SUBJECTIVE & OBJECTIVE
Review of Systems   All other systems reviewed and are negative.    Objective:     Vital Signs (Most Recent):  Temp: 97.6 °F (36.4 °C) (12/06/24 1239)  Pulse: 80 (12/06/24 1500)  Resp: 18 (12/06/24 1518)  BP: (!) 128/58 (12/06/24 1239)  SpO2: (!) 90 % (12/06/24 1239) Vital Signs (24h Range):  Temp:  [97.5 °F (36.4 °C)-98.3 °F (36.8 °C)] 97.6 °F (36.4 °C)  Pulse:  [62-80] 80  Resp:  [15-20] 18  SpO2:  [89 %-92 %] 90 %  BP: (107-156)/(53-65) 128/58     Weight: 81.5 kg (179 lb 10.8 oz)  Body mass index is 31.83 kg/m².    Intake/Output Summary (Last 24 hours) at 12/6/2024 1621  Last data filed at 12/6/2024 0624  Gross per 24 hour   Intake 391.67 ml   Output 310 ml   Net 81.67 ml         Physical Exam  Vitals and nursing note reviewed.   Constitutional:       General: She is not in acute distress.     Appearance: Normal appearance. She is normal weight. She is ill-appearing.   Cardiovascular:      Rate and Rhythm: Normal rate and regular rhythm.      Heart sounds: No murmur heard.  Pulmonary:      Effort: Pulmonary effort is normal. No respiratory distress.      Breath sounds: No wheezing.      Comments: Wearing 2 L NC  Genitourinary:     Comments: Shelton in place  Neurological:      Mental Status: She is alert.             Significant Labs: All pertinent labs within the past 24 hours have been reviewed.  Recent Lab Results         12/06/24  0708        Albumin 2.4       ALP 90       ALT 17       Anion Gap 8       AST 18       Baso # 0.02       Basophil % 0.2       BILIRUBIN TOTAL 0.5  Comment: For infants and newborns, interpretation of results should be based  on gestational age, weight and in agreement with clinical  observations.    Premature Infant recommended reference ranges:  Up to 24 hours.............<8.0 mg/dL  Up to 48 hours............<12.0 mg/dL  3-5 days..................<15.0 mg/dL  6-29 days.................<15.0 mg/dL         BUN 56       Calcium 8.7       Chloride 105       CO2 21       Creatinine  1.4       Differential Method Automated       eGFR 37       Eos # 0.0       Eos % 0.0       Glucose 136       Gran # (ANC) 9.7       Gran % 90.5       Hematocrit 30.0       Hemoglobin 9.5       Immature Grans (Abs) 0.09  Comment: Mild elevation in immature granulocytes is non specific and   can be seen in a variety of conditions including stress response,   acute inflammation, trauma and pregnancy. Correlation with other   laboratory and clinical findings is essential.         Immature Granulocytes 0.8       Lymph # 0.3       Lymph % 3.1       Magnesium  2.4       MCH 26.5       MCHC 31.7       MCV 84       Mono # 0.6       Mono % 5.4       MPV 10.6       nRBC 0       Phosphorus Level 3.0       Platelet Count 147       Potassium 4.4       PROTEIN TOTAL 5.4       RBC 3.59       RDW 15.0       Sodium 134       WBC 10.72               Significant Imaging: I have reviewed all pertinent imaging results/findings within the past 24 hours.    X-Ray Chest 1 View   Final Result      Stable chest.         Electronically signed by: Boni Cr MD   Date:    12/04/2024   Time:    20:14      X-Ray Chest AP Portable   Final Result      Worsening left-sided airspace opacity in comparison to the prior radiograph.  Underlying mass not excluded.         Electronically signed by: Cam Obrien   Date:    12/03/2024   Time:    13:08      MRI Cervical Spine W WO Cont    (Results Pending)   MRI Lumbar Spine W WO Cont    (Results Pending)   MRI Thoracic Spine W WO Cont    (Results Pending)

## 2024-12-06 NOTE — PT/OT/SLP PROGRESS
Speech Language Pathology Treatment    Patient Name:  Katrina Duran   MRN:  [id number]  Admitting Diagnosis: Closed fracture of third lumbar vertebra    Recommendations:                 General Recommendations:  Follow-up not indicated, Ongoing GOC/ACP and Palliative medicine consultation  Diet recommendations:  Soft & Bite Sized Diet - IDDSI Level 6, Liquid Diet Level: Thin liquids - IDDSI Level 0   Aspiration Precautions: 1 bite/sip at a time, Avoid talking while eating, Frequent oral care, HOB to 90 degrees, Meds whole buried in puree if needed, Small bites/sips, and Standard aspiration precautions   General Precautions: Standard, aspiration  Communication strategies:  none    Assessment:     Katrina Duran is a 84 y.o. female with a PMHx significant for COPD, Lung Ca and CAD s/p CABG in 1999 and stent in 2006, brought to ER by EMS for severe lower back. Pt appreciated with functional OM and cognitive-linguistic ability to meet functional communicative needs. Pt and family endorse intermittent pocketing of food and then orally excreting it; although not appreciated during evaluation. Pt reports this is because she has had decreased appetite and endorses food does not taste the same to her. No overt s/s of dysphagia present during bedside CSE, and pt recommended for IDDSI 6-soft and bite sized solids with IDDSI 0-thin liquids, following standard aspiration precautions. Followed up with pt and she denies any c/o dysphagia and reports tolerating diet with preference to continue soft and bite sized solids. Appreciated increased fatigue today. Pt may benefit from dietitian consultation given reported decreased appetite and weight loss as well as ongoing GOC/ACP discussion given metastatic Ca. No further ST indicated at this time. MD to re-consult if medically indicated.     Subjective     Pt seen bedside for ST. Reports fatigue  Patient goals: to rest     Objective:     Has the patient been evaluated by SLP for swallowing?    Yes  Keep patient NPO? No   Current Respiratory Status:        ST saw pt bedside. She was appreciated w increased fatigue and lethargy from initial assessment. No family present during session. Pt reports no appetite at this time and was not interested in PO trials. She denies any c/o dysphagia and reports tolerating diet with preference to continue soft and bite sized solids.  Discussed standard aspiration precautions w pt and she verbalized understanding.   Pt may benefit from dietitian consultation given reported decreased appetite and weight loss as well as ongoing GOC/ACP discussion given metastatic Ca. No further ST indicated at this time. MD to re-consult if medically indicated.     Goals:   Multidisciplinary Problems       SLP Goals       Not on file              Multidisciplinary Problems (Resolved)          Problem: SLP    Goal Priority Disciplines Outcome   SLP Goal   (Resolved)     SLP Met   Description: TPW safely consume least restricitve PO diet w/o incident                       Plan:     Patient to be seen:  2 x/week, 3 x/week   Plan of Care expires:  12/12/24  Plan of Care reviewed with:  patient   SLP Follow-Up:  No       Discharge recommendations:  No Therapy Indicated   Barriers to Discharge:  None    Time Tracking:     SLP Treatment Date:   12/06/24  Speech Start Time:  1045  Speech Stop Time:  1055     Speech Total Time (min):  10 min    Billable Minutes: Total Time 10    Marrycarissa Mcpherson MA, PL-SLP, CF-SLP  Speech Language Pathologist  12/06/2024

## 2024-12-07 PROBLEM — G89.3 NEOPLASM RELATED PAIN: Status: ACTIVE | Noted: 2024-12-07

## 2024-12-07 PROBLEM — C79.51 METASTASIS TO BONE: Status: ACTIVE | Noted: 2024-12-07

## 2024-12-07 PROBLEM — K76.9 LIVER LESION: Status: ACTIVE | Noted: 2024-12-07

## 2024-12-07 LAB
ACINETOBACTER CALCOACETICUS/BAUMANNII COMPLEX: NOT DETECTED
ANION GAP SERPL CALC-SCNC: 8 MMOL/L (ref 8–16)
AV INDEX (PROSTH): 0.87
AV MEAN GRADIENT: 9.4 MMHG
AV PEAK GRADIENT: 14.4 MMHG
AV REGURGITATION PRESSURE HALF TIME: 539.62 MS
AV VELOCITY RATIO: 0.58
BACTEROIDES FRAGILIS: NOT DETECTED
BASOPHILS # BLD AUTO: 0.02 K/UL (ref 0–0.2)
BASOPHILS NFR BLD: 0.2 % (ref 0–1.9)
BSA FOR ECHO PROCEDURE: 1.9 M2
BUN SERPL-MCNC: 44 MG/DL (ref 8–23)
CALCIUM SERPL-MCNC: 8.9 MG/DL (ref 8.7–10.5)
CANDIDA ALBICANS: NOT DETECTED
CANDIDA AURIS: NOT DETECTED
CANDIDA GLABRATA: NOT DETECTED
CANDIDA KRUSEI: NOT DETECTED
CANDIDA PARAPSILOSIS: NOT DETECTED
CANDIDA TROPICALIS: NOT DETECTED
CHLORIDE SERPL-SCNC: 106 MMOL/L (ref 95–110)
CO2 SERPL-SCNC: 20 MMOL/L (ref 23–29)
CREAT SERPL-MCNC: 1.1 MG/DL (ref 0.5–1.4)
CRYPTOCOCCUS NEOFORMANS/GATTII: NOT DETECTED
CTX-M GENE (ESBL PRODUCER): ABNORMAL
CV ECHO LV RWT: 0.74 CM
DIFFERENTIAL METHOD BLD: ABNORMAL
DOP CALC AO PEAK VEL: 1.9 M/S
DOP CALC AO VTI: 30.3 CM
DOP CALC LVOT PEAK VEL: 1.1 M/S
DOP CALC RVOT PEAK VEL: 1.05 M/S
DOP CALC RVOT VTI: 14.3 CM
DOP CALCLVOT PEAK VEL VTI: 26.3 CM
E WAVE DECELERATION TIME: 191.83 MSEC
E/A RATIO: 0.87
E/E' RATIO: 11.57 M/S
ECHO LV POSTERIOR WALL: 1.6 CM (ref 0.6–1.1)
ENTEROBACTER CLOACAE COMPLEX: NOT DETECTED
ENTEROBACTERALES: NOT DETECTED
ENTEROCOCCUS FAECALIS: DETECTED
ENTEROCOCCUS FAECIUM: NOT DETECTED
EOSINOPHIL # BLD AUTO: 0.1 K/UL (ref 0–0.5)
EOSINOPHIL NFR BLD: 1.1 % (ref 0–8)
ERYTHROCYTE [DISTWIDTH] IN BLOOD BY AUTOMATED COUNT: 15 % (ref 11.5–14.5)
ESCHERICHIA COLI: NOT DETECTED
EST. GFR  (NO RACE VARIABLE): 49 ML/MIN/1.73 M^2
FRACTIONAL SHORTENING: 44.2 % (ref 28–44)
GLUCOSE SERPL-MCNC: 118 MG/DL (ref 70–110)
HAEMOPHILUS INFLUENZAE: NOT DETECTED
HCT VFR BLD AUTO: 33.1 % (ref 37–48.5)
HGB BLD-MCNC: 10.8 G/DL (ref 12–16)
IMM GRANULOCYTES # BLD AUTO: 0.17 K/UL (ref 0–0.04)
IMM GRANULOCYTES NFR BLD AUTO: 1.8 % (ref 0–0.5)
IMP GENE (CARBAPENEM RESISTANT): ABNORMAL
INTERVENTRICULAR SEPTUM: 1.5 CM (ref 0.6–1.1)
IVC DIAMETER: 2.31 CM
IVRT: 72.31 MSEC
KLEBSIELLA AEROGENES: NOT DETECTED
KLEBSIELLA OXYTOCA: NOT DETECTED
KLEBSIELLA PNEUMONIAE GROUP: NOT DETECTED
KPC RESISTANCE GENE (CARBAPENEM): ABNORMAL
LA MAJOR: 5.57 CM
LA MINOR: 5.64 CM
LA WIDTH: 3.6 CM
LEFT ATRIUM SIZE: 4.76 CM
LEFT ATRIUM VOLUME INDEX: 44.4 ML/M2
LEFT ATRIUM VOLUME: 81.64 CM3
LEFT INTERNAL DIMENSION IN SYSTOLE: 2.4 CM (ref 2.1–4)
LEFT VENTRICLE DIASTOLIC VOLUME INDEX: 45.83 ML/M2
LEFT VENTRICLE DIASTOLIC VOLUME: 84.33 ML
LEFT VENTRICLE MASS INDEX: 147.6 G/M2
LEFT VENTRICLE SYSTOLIC VOLUME INDEX: 11 ML/M2
LEFT VENTRICLE SYSTOLIC VOLUME: 20.33 ML
LEFT VENTRICULAR INTERNAL DIMENSION IN DIASTOLE: 4.3 CM (ref 3.5–6)
LEFT VENTRICULAR MASS: 271.6 G
LISTERIA MONOCYTOGENES: NOT DETECTED
LV LATERAL E/E' RATIO: 10.13 M/S
LV SEPTAL E/E' RATIO: 13.5 M/S
LVED V (TEICH): 84.33 ML
LVES V (TEICH): 20.33 ML
LVOT MG: 3.53 MMHG
LVOT MV: 0.92 CM/S
LYMPHOCYTES # BLD AUTO: 0.4 K/UL (ref 1–4.8)
LYMPHOCYTES NFR BLD: 3.9 % (ref 18–48)
MCH RBC QN AUTO: 26.7 PG (ref 27–31)
MCHC RBC AUTO-ENTMCNC: 32.6 G/DL (ref 32–36)
MCR-1: ABNORMAL
MCV RBC AUTO: 82 FL (ref 82–98)
MEC A/C AND MREJ (MRSA): ABNORMAL
MEC A/C: ABNORMAL
MONOCYTES # BLD AUTO: 0.6 K/UL (ref 0.3–1)
MONOCYTES NFR BLD: 6.2 % (ref 4–15)
MV PEAK A VEL: 0.93 M/S
MV PEAK E VEL: 0.81 M/S
MV STENOSIS PRESSURE HALF TIME: 55.63 MS
MV VALVE AREA P 1/2 METHOD: 3.95 CM2
NDM GENE (CARBAPENEM RESISTANT): ABNORMAL
NEISSERIA MENINGITIDIS: NOT DETECTED
NEUTROPHILS # BLD AUTO: 8.3 K/UL (ref 1.8–7.7)
NEUTROPHILS NFR BLD: 86.8 % (ref 38–73)
NRBC BLD-RTO: 0 /100 WBC
OXA-48-LIKE (CARBAPENEM RESISTANT): ABNORMAL
PISA AR MAX VEL: 2.03 M/S
PISA MRMAX VEL: 4.48 M/S
PISA TR MAX VEL: 3.74 M/S
PLATELET # BLD AUTO: 174 K/UL (ref 150–450)
PMV BLD AUTO: 10.3 FL (ref 9.2–12.9)
POTASSIUM SERPL-SCNC: 4.5 MMOL/L (ref 3.5–5.1)
PROTEUS SPECIES: NOT DETECTED
PSEUDOMONAS AERUGINOSA: NOT DETECTED
PV MEAN GRADIENT: 4 MMHG
RA MAJOR: 4.73 CM
RA PRESSURE ESTIMATED: 15 MMHG
RA WIDTH: 3.5 CM
RBC # BLD AUTO: 4.05 M/UL (ref 4–5.4)
RIGHT VENTRICULAR END-DIASTOLIC DIMENSION: 3.38 CM
RV TB RVSP: 19 MMHG
SALMONELLA SP: NOT DETECTED
SERRATIA MARCESCENS: NOT DETECTED
SODIUM SERPL-SCNC: 134 MMOL/L (ref 136–145)
STAPHYLOCOCCUS AUREUS: NOT DETECTED
STAPHYLOCOCCUS EPIDERMIDIS: NOT DETECTED
STAPHYLOCOCCUS LUGDUNESIS: NOT DETECTED
STAPHYLOCOCCUS SPECIES: NOT DETECTED
STENOTROPHOMONAS MALTOPHILIA: NOT DETECTED
STREPTOCOCCUS AGALACTIAE: NOT DETECTED
STREPTOCOCCUS PNEUMONIAE: NOT DETECTED
STREPTOCOCCUS PYOGENES: NOT DETECTED
STREPTOCOCCUS SPECIES: NOT DETECTED
TDI LATERAL: 0.08 M/S
TDI SEPTAL: 0.06 M/S
TDI: 0.07 M/S
TR MAX PG: 56 MMHG
TR MEAN GRADIENT: 52 MMHG
TRICUSPID ANNULAR PLANE SYSTOLIC EXCURSION: 1.77 CM
TV REST PULMONARY ARTERY PRESSURE: 71 MMHG
VAN A/B (VRE GENE): NOT DETECTED
VIM GENE (CARBAPENEM RESISTANT): ABNORMAL
WBC # BLD AUTO: 9.61 K/UL (ref 3.9–12.7)
Z-SCORE OF LEFT VENTRICULAR DIMENSION IN END DIASTOLE: -1.72
Z-SCORE OF LEFT VENTRICULAR DIMENSION IN END SYSTOLE: -2.15

## 2024-12-07 PROCEDURE — 99900035 HC TECH TIME PER 15 MIN (STAT)

## 2024-12-07 PROCEDURE — 94640 AIRWAY INHALATION TREATMENT: CPT

## 2024-12-07 PROCEDURE — 63600175 PHARM REV CODE 636 W HCPCS: Performed by: EMERGENCY MEDICINE

## 2024-12-07 PROCEDURE — 36415 COLL VENOUS BLD VENIPUNCTURE: CPT | Performed by: HOSPITALIST

## 2024-12-07 PROCEDURE — 21400001 HC TELEMETRY ROOM

## 2024-12-07 PROCEDURE — 80048 BASIC METABOLIC PNL TOTAL CA: CPT | Performed by: HOSPITALIST

## 2024-12-07 PROCEDURE — 99223 1ST HOSP IP/OBS HIGH 75: CPT | Mod: ,,, | Performed by: INTERNAL MEDICINE

## 2024-12-07 PROCEDURE — 87186 SC STD MICRODIL/AGAR DIL: CPT | Performed by: STUDENT IN AN ORGANIZED HEALTH CARE EDUCATION/TRAINING PROGRAM

## 2024-12-07 PROCEDURE — 63600175 PHARM REV CODE 636 W HCPCS

## 2024-12-07 PROCEDURE — 87077 CULTURE AEROBIC IDENTIFY: CPT | Performed by: STUDENT IN AN ORGANIZED HEALTH CARE EDUCATION/TRAINING PROGRAM

## 2024-12-07 PROCEDURE — 25000003 PHARM REV CODE 250

## 2024-12-07 PROCEDURE — A4216 STERILE WATER/SALINE, 10 ML: HCPCS | Performed by: EMERGENCY MEDICINE

## 2024-12-07 PROCEDURE — 25000003 PHARM REV CODE 250: Performed by: INTERNAL MEDICINE

## 2024-12-07 PROCEDURE — 36415 COLL VENOUS BLD VENIPUNCTURE: CPT | Performed by: STUDENT IN AN ORGANIZED HEALTH CARE EDUCATION/TRAINING PROGRAM

## 2024-12-07 PROCEDURE — 85025 COMPLETE CBC W/AUTO DIFF WBC: CPT | Performed by: HOSPITALIST

## 2024-12-07 PROCEDURE — 25000242 PHARM REV CODE 250 ALT 637 W/ HCPCS

## 2024-12-07 PROCEDURE — 87040 BLOOD CULTURE FOR BACTERIA: CPT | Mod: 59 | Performed by: STUDENT IN AN ORGANIZED HEALTH CARE EDUCATION/TRAINING PROGRAM

## 2024-12-07 PROCEDURE — 94761 N-INVAS EAR/PLS OXIMETRY MLT: CPT

## 2024-12-07 PROCEDURE — 63600175 PHARM REV CODE 636 W HCPCS: Performed by: INTERNAL MEDICINE

## 2024-12-07 PROCEDURE — 25000003 PHARM REV CODE 250: Performed by: EMERGENCY MEDICINE

## 2024-12-07 RX ORDER — HYDROMORPHONE HYDROCHLORIDE 1 MG/ML
1 INJECTION, SOLUTION INTRAMUSCULAR; INTRAVENOUS; SUBCUTANEOUS EVERY 4 HOURS PRN
Status: DISCONTINUED | OUTPATIENT
Start: 2024-12-07 | End: 2024-12-10 | Stop reason: HOSPADM

## 2024-12-07 RX ORDER — OXYCODONE AND ACETAMINOPHEN 7.5; 325 MG/1; MG/1
1 TABLET ORAL EVERY 4 HOURS PRN
Status: DISCONTINUED | OUTPATIENT
Start: 2024-12-07 | End: 2024-12-10 | Stop reason: HOSPADM

## 2024-12-07 RX ADMIN — HEPARIN SODIUM 5000 UNITS: 5000 INJECTION INTRAVENOUS; SUBCUTANEOUS at 09:12

## 2024-12-07 RX ADMIN — HYDROMORPHONE HYDROCHLORIDE 0.5 MG: 1 INJECTION, SOLUTION INTRAMUSCULAR; INTRAVENOUS; SUBCUTANEOUS at 11:12

## 2024-12-07 RX ADMIN — HEPARIN SODIUM 5000 UNITS: 5000 INJECTION INTRAVENOUS; SUBCUTANEOUS at 05:12

## 2024-12-07 RX ADMIN — IPRATROPIUM BROMIDE AND ALBUTEROL SULFATE 3 ML: 2.5; .5 SOLUTION RESPIRATORY (INHALATION) at 07:12

## 2024-12-07 RX ADMIN — SERTRALINE HYDROCHLORIDE 100 MG: 50 TABLET ORAL at 09:12

## 2024-12-07 RX ADMIN — LIDOCAINE 5% 1 PATCH: 700 PATCH TOPICAL at 11:12

## 2024-12-07 RX ADMIN — ARFORMOTEROL TARTRATE 15 MCG: 15 SOLUTION RESPIRATORY (INHALATION) at 07:12

## 2024-12-07 RX ADMIN — HYDROMORPHONE HYDROCHLORIDE 0.5 MG: 1 INJECTION, SOLUTION INTRAMUSCULAR; INTRAVENOUS; SUBCUTANEOUS at 06:12

## 2024-12-07 RX ADMIN — GABAPENTIN 100 MG: 100 CAPSULE ORAL at 09:12

## 2024-12-07 RX ADMIN — GABAPENTIN 100 MG: 100 CAPSULE ORAL at 11:12

## 2024-12-07 RX ADMIN — GABAPENTIN 100 MG: 100 CAPSULE ORAL at 02:12

## 2024-12-07 RX ADMIN — HEPARIN SODIUM 5000 UNITS: 5000 INJECTION INTRAVENOUS; SUBCUTANEOUS at 02:12

## 2024-12-07 RX ADMIN — Medication 10 ML: at 09:12

## 2024-12-07 RX ADMIN — ATORVASTATIN CALCIUM 40 MG: 40 TABLET, FILM COATED ORAL at 11:12

## 2024-12-07 RX ADMIN — HYDROCODONE BITARTRATE AND ACETAMINOPHEN 1 TABLET: 10; 325 TABLET ORAL at 03:12

## 2024-12-07 RX ADMIN — Medication 10 ML: at 02:12

## 2024-12-07 RX ADMIN — HYDROCODONE BITARTRATE AND ACETAMINOPHEN 1 TABLET: 10; 325 TABLET ORAL at 02:12

## 2024-12-07 RX ADMIN — OXYCODONE AND ACETAMINOPHEN 1 TABLET: 7.5; 325 TABLET ORAL at 09:12

## 2024-12-07 RX ADMIN — MUPIROCIN: 20 OINTMENT TOPICAL at 09:12

## 2024-12-07 RX ADMIN — AMPICILLIN SODIUM AND SULBACTAM SODIUM 3 G: 2; 1 INJECTION, POWDER, FOR SOLUTION INTRAMUSCULAR; INTRAVENOUS at 06:12

## 2024-12-07 RX ADMIN — PANTOPRAZOLE SODIUM 40 MG: 40 TABLET, DELAYED RELEASE ORAL at 11:12

## 2024-12-07 RX ADMIN — AMPICILLIN SODIUM AND SULBACTAM SODIUM 3 G: 2; 1 INJECTION, POWDER, FOR SOLUTION INTRAMUSCULAR; INTRAVENOUS at 07:12

## 2024-12-07 RX ADMIN — MUPIROCIN: 20 OINTMENT TOPICAL at 11:12

## 2024-12-07 RX ADMIN — Medication 10 ML: at 05:12

## 2024-12-07 RX ADMIN — BUDESONIDE INHALATION 0.5 MG: 0.5 SUSPENSION RESPIRATORY (INHALATION) at 07:12

## 2024-12-07 RX ADMIN — HYDROMORPHONE HYDROCHLORIDE 0.5 MG: 1 INJECTION, SOLUTION INTRAMUSCULAR; INTRAVENOUS; SUBCUTANEOUS at 05:12

## 2024-12-07 NOTE — SUBJECTIVE & OBJECTIVE
Oncology Treatment Plan:   [No matching plan found]    Current Facility-Administered Medications   Medication    acetaminophen tablet 650 mg    albuterol-ipratropium 2.5 mg-0.5 mg/3 mL nebulizer solution 3 mL    ampicillin-sulbactam (UNASYN) 3 g in 0.9% NaCl 100 mL IVPB (MB+)    arformoteroL nebulizer solution 15 mcg    atorvastatin tablet 40 mg    budesonide nebulizer solution 0.5 mg    dextrose 10% bolus 125 mL 125 mL    dextrose 10% bolus 250 mL 250 mL    gabapentin capsule 100 mg    heparin (porcine) injection 5,000 Units    HYDROcodone-acetaminophen  mg per tablet 1 tablet    HYDROmorphone injection 0.5 mg    influenza (adjuvanted) (Fluad) 45 mcg/0.5 mL IM vaccine (> or = 66 yo) 0.5 mL    LIDOcaine 5 % patch 1 patch    melatonin tablet 6 mg    morphine injection 1 mg    morphine injection 2 mg    mupirocin 2 % ointment    naloxone 0.4 mg/mL injection 0.02 mg    ondansetron disintegrating tablet 8 mg    pantoprazole EC tablet 40 mg    polyethylene glycol packet 17 g    promethazine tablet 25 mg    senna-docusate 8.6-50 mg per tablet 1 tablet    sertraline tablet 100 mg    sodium chloride 0.9% flush 10 mL       Review of patient's allergies indicates:   Allergen Reactions    Codeine Nausea Only    Adhesive Itching and Blisters     tape  tape    Latex, natural rubber Itching        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    COPD (chronic obstructive pulmonary disease)     Coronary artery disease     Essential (primary) hypertension 12/15/2005    Heart attack     1999    Mass of lower lobe of right lung 11/1/2016    Simple chronic bronchitis 11/1/2016     Past Surgical History:   Procedure Laterality Date    APPENDECTOMY      BACK SURGERY      CORONARY ANGIOPLASTY WITH STENT PLACEMENT      HYSTERECTOMY      OTHER SURGICAL  HISTORY      Abdominal Aneurysm    TONSILLECTOMY       Family History       Problem Relation (Age of Onset)    Cancer Sister          Tobacco Use    Smoking status: Former     Current packs/day: 0.00     Types: Cigarettes     Quit date: 1999     Years since quittin.9    Smokeless tobacco: Never   Substance and Sexual Activity    Alcohol use: No    Drug use: No    Sexual activity: Never       Review of Systems   Musculoskeletal:  Negative for arthralgias.   All other systems reviewed and are negative.    Objective:     Vital Signs (Most Recent):  Temp: 98.9 °F (37.2 °C) (24 1228)  Pulse: 92 (24 1300)  Resp: 18 (24 1450)  BP: (!) 163/77 (24 1228)  SpO2: (!) 91 % (24 1228) Vital Signs (24h Range):  Temp:  [97.5 °F (36.4 °C)-98.9 °F (37.2 °C)] 98.9 °F (37.2 °C)  Pulse:  [72-92] 92  Resp:  [] 18  SpO2:  [90 %-96 %] 91 %  BP: (147-182)/(65-84) 163/77     Weight: 81.5 kg (179 lb 10.8 oz)  Body mass index is 31.83 kg/m².  Body surface area is 1.9 meters squared.     Physical Exam   Please see objective node    Significant Labs:   {Select Labs:03745}    Diagnostic Results:  {Imaging Review:74287}

## 2024-12-07 NOTE — ASSESSMENT & PLAN NOTE
Impression: I suspect that her symptoms are due to her L3 and S1 disease.  I have informed the family that she could benefit from a single fraction of radiation therapy prior to discharge.  We have talked briefly about the role of immunotherapy and they would like to talk to a medical oncologist regarding this as it was an adenocarcinoma of the lung.  She has had no previous biopsy after her initial diagnosis per their history.  I believe this is reasonable as she had a fairly good performance status prior to the onset of this illness.  I would like to have plain films of her left hip and femur to ensure that we are not dealing with bone metastases other than her spine as the cause of her pain.  Plan: We will plan to treat her on Monday with a single 8 Gy fraction which has a good chance of providing some pain relief.  As she is already over a week out with bladder dysfunction I do not believe that urgent radiotherapy is indicated.  I believe we can deliver better care by obtaining her records from the [facility] on Monday prior to treatment.  I certainly appreciate participating in this delightful woman's care.

## 2024-12-07 NOTE — PROGRESS NOTES
[facility] Medicine  Progress Note    Patient Name: Katrina Duran  MRN: [id number]  Patient Class: IP- Inpatient   Admission Date: 12/3/2024  Length of Stay: 3 days  Attending Physician: Romulo Jones,*  Primary Care Provider: Liliya Freitas DO        Subjective     Principal Problem:Closed fracture of third lumbar vertebra        HPI:  Katrina Duran is a 84 y.o. female with PMHx of HTN, COPD, Lung Ca and CAD s/p CABG in 1999 and stent in 2006, brought to ER by EMS for severe lower back pain that radiates to her right leg since July of this year. Pt reports she is unable to stand and ambulate, like her legs are giving out upon standing and experiences excruciating pain when getting out of bed. Pt notes that prior to ED visit, she urinated at around 12:30 AM. She denies any numbness upon wiping. Pt's daughter reports she has not eaten in the last few days. Pt reports having a subjective fever but no measured oral temperature. She also c/o nausea. Pt denies any fall, cough, CP, SOB, dysuria, and abd pain.     Pt has hx lung cancer and received radiation in November of 2023. She had appointment with her PCP yesterday and was told her lung cancer has increased in size. Pt was prescribed 5 mg Percocet yesterday by her PCP to manage her pain. Pt was also seen in ED last Saturday. She has an upcoming appointment with pain management.      In the ER, VSS Afeb, labs showed WBC 12.4, H/H 11/35, Segs 92Na 134, HCo3 17, Bun/Cr  43/1.5. Lactate 1.8, BNP 1873. UA ++ve on 12/1/24.     CT L/S on 12/1-  subtle nondisplaced fracture involving the inferior, posterior and left lateral corner of the L3 vertebral body. Marked degenerative disc changes and degenerative facet arthropathy noted diffusely with marked convex left curvature of the lumbar spine.  Multilevel, multifactorial moderate to severe spinal canal stenosis and nerve root foramen narrowing, most significantly involving the L3/L4 disc level.      3.  Aortic stent graft in place, with similar amount of mild indistinction surrounding the stent graft when compared to the prior study, of doubtful clinical significance.    CTA Chest Abdomen and Pelvis: 12/1/24  1.  Detrimental change.  Interval increase in angular opacity within the lingula, likely a combination of partial collapse with possible underlying mass.  Interval increase in size of semi-solid right lower lobe nodule with increase central solid component, currently measuring 4.2 cm.  Interval development of a 7 mm spiculated nodule in the right lung apex.  Interval development of at least 2 hepatic masses measuring up to 3.9 cm.  These changes must be considered neoplastic in origin until proven otherwise.  A repeat PET CT scan would be helpful.  4.  Negative for pulmonary embolism, aneurysm or dissection.  Negative for intraperitoneal free air, free fluid or hemoperitoneum.    Hosp Med consulted and pt admitted for acute intractable Lumbar pain sec to L3 vertebral fracture and metastatic Lung Ca with B lung mets as well as Liver mets. Pt also has UTI, NICKI and Dehydration. Elevated BNP likely sec to NICKI. Pt started on IV rocephin, NS, Norco and IV MS for pain control. Will consult XRT for possible L/S Palliative Radiation. NSGY was also contacted and did not consider it a neurosurgical case.         Overview/Hospital Course:    12/4/24  Admitted for back pain x 1 week  Pain not well controlled, adjusted PRN regimen  Found to have L3 fracture  RN reports patient retained 650 cc overnight, required straight cath  Noted urinary retention again this afternoon  Shelton catheter ordered  ---  Patient with history of abdominal aorta repair with hardware.  Per MRI department, no documentation at this time to investigate if obtaining MRI is safe at this time.  Patient reportedly had an MRI done at outside facility several months ago, medical records pending.  MRI needed to evaluate for possible metastatic spread  in setting of underlying lung cancer.  Patient wishes to transfer her care to [facility]. Heme/Onc following.    12/5/24  Reviewed overnight events, transferred to ICU due to lethargy, hypoxia (lowest O2 sat 77%).  Possibly aspirated vomitus. BNP elevated 1800. IV fluids were stopped  Started on IV antibiotics, currently on Zosyn.  Currently on 2L NC  Stable to return to floor  ---  MRI lumbar spine was done on Oct 4th 2024 at "[facility] [employer] in [place], [place] - phone number [phone].   There office will send the report to fax number in ICU - [phone].  Updated MRI/Radiology staff via secure chat.  Updated patient's family members in waiting room.    12/6/24  NAEON, patient states no pain when laying still  Severe pain with movement, controlled with current PRN regimen  Plans for MRI this evening  Currently no Neurosurgical coverage at this time  Discussed possibly transferring to [facility], pending MRI findings  Updated patient's family at bedside    12/07/2024   Patient in mild distress with ongoing pain   Alert and oriented   MRI thoracic, lumbar spine-findings suggestive of bone metastasis  Discussed findings with patient/son at bedside, daughters over phone-- given patient's age, underlying comorbidities, acute issues, opting to proceed with hospice; declined any further interventions, neurosurgical consultation at this point.    Open for possible palliative radiation therapy, consulted radiation oncologist per Heme-Onc recommendations   Consulted palliative for symptom management   Blood culture x2 as of 12/04/2024 positive for Gram-positive cocci, currently on empiric antibiotics, ordered for repeat blood cultures, id follow up           Review of Systems  Objective:     Vital Signs (Most Recent):  Temp: 98.9 °F (37.2 °C) (12/07/24 1228)  Pulse: 83 (12/07/24 1228)  Resp: 18 (12/07/24 1228)  BP: (!) 163/77 (12/07/24 1228)  SpO2: (!) 91 % (12/07/24 1228) Vital Signs (24h Range):  Temp:  [97.5 °F (36.4  °C)-98.9 °F (37.2 °C)] 98.9 °F (37.2 °C)  Pulse:  [72-89] 83  Resp:  [] 18  SpO2:  [90 %-96 %] 91 %  BP: (147-182)/(65-84) 163/77     Weight: 81.5 kg (179 lb 10.8 oz)  Body mass index is 31.83 kg/m².    Intake/Output Summary (Last 24 hours) at 12/7/2024 1324  Last data filed at 12/7/2024 0307  Gross per 24 hour   Intake 2220 ml   Output 1500 ml   Net 720 ml         Physical Exam        Constitutional:       General: She is not in acute distress.     Appearance: Normal appearance. She is normal weight. She is ill-appearing.   Cardiovascular:      Rate and Rhythm: Normal rate and regular rhythm.      Heart sounds: No murmur heard.  Pulmonary:      Effort: Pulmonary effort is normal. No respiratory distress.      Breath sounds: No wheezing.      Comments: Wearing 2 L NC  Genitourinary:     Comments: Shelton in place  Neurological:      Mental Status: She is alert.     Significant Labs: All pertinent labs within the past 24 hours have been reviewed.  CBC:   Recent Labs   Lab 12/06/24  0708 12/07/24  0550   WBC 10.72 9.61   HGB 9.5* 10.8*   HCT 30.0* 33.1*   * 174     CMP:   Recent Labs   Lab 12/06/24  0708 12/07/24  0550   * 134*   K 4.4 4.5    106   CO2 21* 20*   * 118*   BUN 56* 44*   CREATININE 1.4 1.1   CALCIUM 8.7 8.9   PROT 5.4*  --    ALBUMIN 2.4*  --    BILITOT 0.5  --    ALKPHOS 90  --    AST 18  --    ALT 17  --    ANIONGAP 8 8       Significant Imaging:     Imaging Results              X-Ray Chest AP Portable (Final result)  Result time 12/03/24 13:08:01      Final result by Cam Obrien MD (12/03/24 13:08:01)                   Impression:      Worsening left-sided airspace opacity in comparison to the prior radiograph.  Underlying mass not excluded.      Electronically signed by: Cam Obrien  Date:    12/03/2024  Time:    13:08               Narrative:    EXAMINATION:  XR CHEST AP PORTABLE    CLINICAL HISTORY:  Low back pain, unspecified    TECHNIQUE:  Single frontal  view of the chest was performed.    COMPARISON:  Multiple    FINDINGS:  Suspect worsening left-sided pulmonary opacity in comparison to the prior radiograph.  Underlying mass not excluded.  No pleural fluid or pneumothorax.    The cardiac silhouette is enlarged.  The hilar and mediastinal contours are unremarkable.    Bones are intact.                                       Assessment and Plan     * Closed fracture of third lumbar vertebra  Multimodal pain control  PT/OT  NSGY and Oncology consult  Prognosis poor on account of underlying Lung Ca    12/4/24  MRI spine ordered to evaluate for metastatic disease  Unable to obtain due to concerns for MRI safety  Medical records pending, patient reports undergoing MRI previously    12/5/24  MRI lumbar spine was done on Oct 4th 2024 at [facility] [facility] in [place], [place] - phone number [phone].   There office will send the report to fax number in icu - [phone].  Updated MRI/Radiology staff via secure chat.    Urinary retention  Urinary retention noted soon after admission  Bladder scan x 2 with high residuals  UA with evidence of infectious etiology  Possibly related to spinal fracture, MRI pending  Consider voiding trial over weekend      NICKI (acute kidney injury)  NICKI is likely due to pre-renal azotemia due to dehydration. Baseline creatinine is  1.1 . Most recent creatinine and eGFR are listed below.  Recent Labs     12/04/24  0515 12/04/24 2026 12/05/24  0318   CREATININE 1.5* 1.3 1.3   EGFRNORACEVR 34* 40* 40*        Plan  - NICKI is improving  - Avoid nephrotoxins and renally dose meds for GFR listed above  - Monitor urine output, serial BMP, and adjust therapy as needed    Non-small cell lung cancer  Patient has Known metastatic cancer of Lung primary. The cancer has metastasized to Liver, opposite Lung and possibly Lumbar Spine. The patient is not under the care of an outpatient oncologist. The patient is not undergoing active chemotherapy. Their staging  information is listed below.     Oncology following, [facility] MRI spine  Multimodal pain control      UTI (urinary tract infection)  As seen on UA 12/1- asymptomatic, no obvious dysuria sec to severe LBP  Will Treat with IVF and IV Rocephin  Urine Cx from 12/1- Neg    12/4/24  Ceftriaxone day # 2  Shelton placed due to urinary retention    12/5/24  Urine culture no growth  Ceftriaxone stopped      Pneumonia of left lung due to infectious organism  Patient has a diagnosis of pneumonia. The cause of the pneumonia is suspected to be bacterial in etiology but organism is not known. The pneumonia is worsening due to O2 requirement . The patient has the following signs/symptoms of pneumonia: persistent hypoxia . The patient does have a current oxygen requirement and the patient does not have a home oxygen requirement. I have reviewed the pertinent imaging. The following cultures have been collected: Blood cultures The culture results are listed below.     Current antimicrobial regimen consists of the antibiotics listed below. Will monitor patient closely and continue current treatment plan unchanged.    Antibiotics (From admission, onward)      Start     Stop Route Frequency Ordered    12/05/24 1230  piperacillin-tazobactam (ZOSYN) 4.5 g in D5W 100 mL IVPB (MB+)         -- IV Every 8 hours (non-standard times) 12/05/24 0939    12/05/24 1045  mupirocin 2 % ointment         12/10/24 0859 Nasl 2 times daily 12/05/24 0941            Microbiology Results (last 7 days)       Procedure Component Value Units Date/Time    Blood culture [[id number]] Collected: 12/04/24 2147    Order Status: Completed Specimen: Blood Updated: 12/05/24 0915     Blood Culture, Routine No Growth to date    Narrative:      #2    Blood culture [[id number]] Collected: 12/04/24 2147    Order Status: Completed Specimen: Blood Updated: 12/05/24 0915     Blood Culture, Routine No Growth to date    Culture, MRSA [[id number]] Collected: 12/04/24 2029    Order Status:  Sent Specimen: MRSA source from Nares, Left Updated: 12/05/24 0137    Respiratory Infection Panel (PCR), Nasopharyngeal [[id number]] Collected: 12/04/24 2031    Order Status: Completed Specimen: Nasopharyngeal Swab Updated: 12/05/24 0014     Respiratory Infection Panel Source NP swab     Adenovirus Not Detected     Coronavirus 229E, Common Cold Virus Not Detected     Coronavirus HKU1, Common Cold Virus Not Detected     Coronavirus NL63, Common Cold Virus Not Detected     Coronavirus OC43, Common Cold Virus Not Detected     Comment: The Coronavirus strains detected in this test cause the common cold.  These strains are not the COVID-19 (novel Coronavirus)strain   associated with the respiratory disease outbreak.          SARS-CoV2 (COVID-19) Qualitative PCR Not Detected     Human Metapneumovirus Not Detected     Human Rhinovirus/Enterovirus Not Detected     Influenza A (subtypes H1, H1-2009,H3) Not Detected     Influenza B Not Detected     Parainfluenza Virus 1 Not Detected     Parainfluenza Virus 2 Not Detected     Parainfluenza Virus 3 Not Detected     Parainfluenza Virus 4 Not Detected     Respiratory Syncytial Virus Not Detected     Bordetella Parapertussis (TS4217) Not Detected     Bordetella pertussis (ptxP) Not Detected     Chlamydia pneumoniae Not Detected     Mycoplasma pneumoniae Not Detected     Comment: Respiratory Infection Panel testing performed by Multiplex PCR.       Narrative:      Assay not valid for lower respiratory specimens, alternate  testing required.    Culture, Respiratory with Gram Stain [[id number]] Collected: 12/04/24 2029    Order Status: Sent Specimen: Respiratory from Endotracheal Aspirate Updated: 12/04/24 2029              VTE Risk Mitigation (From admission, onward)           Ordered     heparin (porcine) injection 5,000 Units  Every 8 hours         12/03/24 1837     IP VTE HIGH RISK PATIENT  Once         12/03/24 1837     Place sequential compression device  Until discontinued          12/03/24 1837                    Discharge Planning   LACIE:      Code Status: DNR   Medical Readiness for Discharge Date: symptom management; consult recommendations   Discharge Plan A: Home with family                Please place Justification for DME        Romulo Jones MD  Department of Hospital Medicine   [facility] - Med Surg

## 2024-12-07 NOTE — NURSING
Lab notified me that two cultures from 12/4 resulted and are positive for gram positive bacteria   MD notified, patient's son informed

## 2024-12-07 NOTE — SUBJECTIVE & OBJECTIVE
Review of Systems  Objective:     Vital Signs (Most Recent):  Temp: 98.9 °F (37.2 °C) (12/07/24 1228)  Pulse: 83 (12/07/24 1228)  Resp: 18 (12/07/24 1228)  BP: (!) 163/77 (12/07/24 1228)  SpO2: (!) 91 % (12/07/24 1228) Vital Signs (24h Range):  Temp:  [97.5 °F (36.4 °C)-98.9 °F (37.2 °C)] 98.9 °F (37.2 °C)  Pulse:  [72-89] 83  Resp:  [] 18  SpO2:  [90 %-96 %] 91 %  BP: (147-182)/(65-84) 163/77     Weight: 81.5 kg (179 lb 10.8 oz)  Body mass index is 31.83 kg/m².    Intake/Output Summary (Last 24 hours) at 12/7/2024 1324  Last data filed at 12/7/2024 0307  Gross per 24 hour   Intake 2220 ml   Output 1500 ml   Net 720 ml         Physical Exam        Constitutional:       General: She is not in acute distress.     Appearance: Normal appearance. She is normal weight. She is ill-appearing.   Cardiovascular:      Rate and Rhythm: Normal rate and regular rhythm.      Heart sounds: No murmur heard.  Pulmonary:      Effort: Pulmonary effort is normal. No respiratory distress.      Breath sounds: No wheezing.      Comments: Wearing 2 L NC  Genitourinary:     Comments: Shelton in place  Neurological:      Mental Status: She is alert.     Significant Labs: All pertinent labs within the past 24 hours have been reviewed.  CBC:   Recent Labs   Lab 12/06/24  0708 12/07/24  0550   WBC 10.72 9.61   HGB 9.5* 10.8*   HCT 30.0* 33.1*   * 174     CMP:   Recent Labs   Lab 12/06/24  0708 12/07/24  0550   * 134*   K 4.4 4.5    106   CO2 21* 20*   * 118*   BUN 56* 44*   CREATININE 1.4 1.1   CALCIUM 8.7 8.9   PROT 5.4*  --    ALBUMIN 2.4*  --    BILITOT 0.5  --    ALKPHOS 90  --    AST 18  --    ALT 17  --    ANIONGAP 8 8       Significant Imaging:     Imaging Results              X-Ray Chest AP Portable (Final result)  Result time 12/03/24 13:08:01      Final result by Cam Obrien MD (12/03/24 13:08:01)                   Impression:      Worsening left-sided airspace opacity in comparison to the prior  radiograph.  Underlying mass not excluded.      Electronically signed by: Cam Obrien  Date:    12/03/2024  Time:    13:08               Narrative:    EXAMINATION:  XR CHEST AP PORTABLE    CLINICAL HISTORY:  Low back pain, unspecified    TECHNIQUE:  Single frontal view of the chest was performed.    COMPARISON:  Multiple    FINDINGS:  Suspect worsening left-sided pulmonary opacity in comparison to the prior radiograph.  Underlying mass not excluded.  No pleural fluid or pneumothorax.    The cardiac silhouette is enlarged.  The hilar and mediastinal contours are unremarkable.    Bones are intact.

## 2024-12-07 NOTE — HPI
I have been asked to see this pleasant 84-year-old woman with presumed metastatic adenocarcinoma of the lung to discuss palliative radiotherapy for metastatic disease to the spine.  She was initially diagnosed with adenocarcinoma of the left upper lobe of lung by biopsy performed 10/07/2016.  PET scanning performed 10/20/2016 showed uptake in the left upper lobe but no other disease in the mediastinum.  EBUS was performed 11/11/2016 with mediastinal crow sampling which was negative.  At that time she declined systemic therapy.  Per the family's history she was treated at the [facility].  She then was re-treated at the [facility] in 2023 for recurrent disease but the family does not believe that an additional biopsy was performed.  She has been having groin and back pain since approximately July of this year but over the last 2-3 weeks has had a significant decline.  Prior to that she was living independently and traveling over an hour with her daughter to go to the [employer].  She developed progressive bilateral radicular pain in her legs which now is significant when she tries to roll over.  This is manifest as pain going from her pelvis to mid thigh over the anterior femurs.  This is not associated with any numbness or weakness.  Over 1 week ago she did require in and out catheterization as she was unable to empty her bladder.  She is currently without a bowel movement for the last 5 days.  She was admitted to the hospital where MRIs performed 12 6 2024 showed what looks like metastatic disease at T10-T11 and T12 as well as L3 and L4-L5 and the SI joint on the left.  Her predominant pain is left-sided in this may be the etiology.  She has fairly severe COPD with an FEV1 of 1.3 L in 2023 associated with the 47% DLCO.  She smoked long-term quitting at the age of 59.  Past medical history: Atherosclerotic cardiovascular disease with PTCA and stents  Abdominal aortic aneurysm with aortic and bilateral  iliac graft  COPD  Past surgical history:  Appendectomy  PTCA with stents  Lumbar laminectomy and fusion  Tonsillectomy  Hysterectomy  Allergies: Latex

## 2024-12-07 NOTE — TREATMENT PLAN
Chart reviewed. This is an 83 yo F notably with metastatic lung cancer involving both lungs, COPD, HTN, AAA w/stent graft, and CAD s/p CABG & PCI w/stent who presented for low back pain. Found to have metastatic lesions of thoracic and lumbar spine. No plans for surgery. Palliative discussion underway. Had stay in ICU due to hypoxia and need for close monitoring. Febrile to 101.7 F on 12/4. Blood cultures growing E faecalis per [facility]. On empiric Unasyn after 2 days of Zosyn. CTA c/a/p reported interval development of multiple inferior right hepatic lobe masses measuring up to 3.9 cm. No new opacities in lungs.    Recommendations:  --All cases of Enterococcal bacteremia require source control and workup for metastatic infection; pose risk of life threatening sepsis  --E faecalis commonly S to ampicillin  --Follow initial blood cx for susceptibilities  --Continue IV amp/sulbactam for added abdominal coverage   --Recommend discussing CTA with IR to ensure masses not indicative of abscess that may need aspiration; possible source of bacteremia   --Check a TTE for vegetations   --Follow repeat blood cx for clearance   --Palliative care following; will need to determine if patient OPAT candidate or PO therapy would be best

## 2024-12-07 NOTE — PLAN OF CARE
Pt is stable, no Sx of acute distress  Pain managed with current regimen   Catheter in place d/t urinary retention     Awaiting MRI results to determine cause of lumbar fracture; may be transferred to [place] if neurosurgery is required     Tele monitor: [id number]; NS   Purposeful rounding    Chart orders reviewed. Bed in lowest position, wheels locked, call light within reach. Pt remains injury free; will cont POC

## 2024-12-08 PROCEDURE — 25000003 PHARM REV CODE 250: Performed by: INTERNAL MEDICINE

## 2024-12-08 PROCEDURE — C1751 CATH, INF, PER/CENT/MIDLINE: HCPCS

## 2024-12-08 PROCEDURE — 25000003 PHARM REV CODE 250

## 2024-12-08 PROCEDURE — 36573 INSJ PICC RS&I 5 YR+: CPT

## 2024-12-08 PROCEDURE — 94799 UNLISTED PULMONARY SVC/PX: CPT

## 2024-12-08 PROCEDURE — 02HV33Z INSERTION OF INFUSION DEVICE INTO SUPERIOR VENA CAVA, PERCUTANEOUS APPROACH: ICD-10-PCS | Performed by: STUDENT IN AN ORGANIZED HEALTH CARE EDUCATION/TRAINING PROGRAM

## 2024-12-08 PROCEDURE — 94640 AIRWAY INHALATION TREATMENT: CPT

## 2024-12-08 PROCEDURE — 25000003 PHARM REV CODE 250: Performed by: EMERGENCY MEDICINE

## 2024-12-08 PROCEDURE — 25000242 PHARM REV CODE 250 ALT 637 W/ HCPCS

## 2024-12-08 PROCEDURE — 63600175 PHARM REV CODE 636 W HCPCS: Performed by: EMERGENCY MEDICINE

## 2024-12-08 PROCEDURE — 63600175 PHARM REV CODE 636 W HCPCS: Performed by: INTERNAL MEDICINE

## 2024-12-08 PROCEDURE — 97530 THERAPEUTIC ACTIVITIES: CPT | Mod: CQ

## 2024-12-08 PROCEDURE — 94761 N-INVAS EAR/PLS OXIMETRY MLT: CPT

## 2024-12-08 PROCEDURE — 27000221 HC OXYGEN, UP TO 24 HOURS

## 2024-12-08 PROCEDURE — 99900035 HC TECH TIME PER 15 MIN (STAT)

## 2024-12-08 PROCEDURE — 21400001 HC TELEMETRY ROOM

## 2024-12-08 PROCEDURE — A4216 STERILE WATER/SALINE, 10 ML: HCPCS | Performed by: EMERGENCY MEDICINE

## 2024-12-08 RX ADMIN — SERTRALINE HYDROCHLORIDE 100 MG: 50 TABLET ORAL at 09:12

## 2024-12-08 RX ADMIN — MUPIROCIN: 20 OINTMENT TOPICAL at 09:12

## 2024-12-08 RX ADMIN — BUDESONIDE INHALATION 0.5 MG: 0.5 SUSPENSION RESPIRATORY (INHALATION) at 07:12

## 2024-12-08 RX ADMIN — OXYCODONE AND ACETAMINOPHEN 1 TABLET: 7.5; 325 TABLET ORAL at 12:12

## 2024-12-08 RX ADMIN — ATORVASTATIN CALCIUM 40 MG: 40 TABLET, FILM COATED ORAL at 08:12

## 2024-12-08 RX ADMIN — Medication 10 ML: at 06:12

## 2024-12-08 RX ADMIN — IPRATROPIUM BROMIDE AND ALBUTEROL SULFATE 3 ML: 2.5; .5 SOLUTION RESPIRATORY (INHALATION) at 07:12

## 2024-12-08 RX ADMIN — PANTOPRAZOLE SODIUM 40 MG: 40 TABLET, DELAYED RELEASE ORAL at 08:12

## 2024-12-08 RX ADMIN — Medication 10 ML: at 09:12

## 2024-12-08 RX ADMIN — LIDOCAINE 5% 1 PATCH: 700 PATCH TOPICAL at 08:12

## 2024-12-08 RX ADMIN — OXYCODONE AND ACETAMINOPHEN 1 TABLET: 7.5; 325 TABLET ORAL at 09:12

## 2024-12-08 RX ADMIN — HYDROMORPHONE HYDROCHLORIDE 1 MG: 1 INJECTION, SOLUTION INTRAMUSCULAR; INTRAVENOUS; SUBCUTANEOUS at 08:12

## 2024-12-08 RX ADMIN — GABAPENTIN 100 MG: 100 CAPSULE ORAL at 08:12

## 2024-12-08 RX ADMIN — AMPICILLIN SODIUM AND SULBACTAM SODIUM 3 G: 2; 1 INJECTION, POWDER, FOR SOLUTION INTRAMUSCULAR; INTRAVENOUS at 06:12

## 2024-12-08 RX ADMIN — ARFORMOTEROL TARTRATE 15 MCG: 15 SOLUTION RESPIRATORY (INHALATION) at 07:12

## 2024-12-08 RX ADMIN — HEPARIN SODIUM 5000 UNITS: 5000 INJECTION INTRAVENOUS; SUBCUTANEOUS at 09:12

## 2024-12-08 RX ADMIN — HEPARIN SODIUM 5000 UNITS: 5000 INJECTION INTRAVENOUS; SUBCUTANEOUS at 06:12

## 2024-12-08 RX ADMIN — OXYCODONE AND ACETAMINOPHEN 1 TABLET: 7.5; 325 TABLET ORAL at 04:12

## 2024-12-08 RX ADMIN — HYDROMORPHONE HYDROCHLORIDE 1 MG: 1 INJECTION, SOLUTION INTRAMUSCULAR; INTRAVENOUS; SUBCUTANEOUS at 04:12

## 2024-12-08 RX ADMIN — HEPARIN SODIUM 5000 UNITS: 5000 INJECTION INTRAVENOUS; SUBCUTANEOUS at 02:12

## 2024-12-08 RX ADMIN — MUPIROCIN: 20 OINTMENT TOPICAL at 08:12

## 2024-12-08 RX ADMIN — GABAPENTIN 100 MG: 100 CAPSULE ORAL at 02:12

## 2024-12-08 RX ADMIN — IPRATROPIUM BROMIDE AND ALBUTEROL SULFATE 3 ML: 2.5; .5 SOLUTION RESPIRATORY (INHALATION) at 02:12

## 2024-12-08 RX ADMIN — GABAPENTIN 100 MG: 100 CAPSULE ORAL at 09:12

## 2024-12-08 NOTE — PLAN OF CARE
Pt is stable, VS WDL. No Sx of acute distress    Pain controlled with new regimen   PICC line in place   Abx given as ordered   Tele monitor: [id number]; NSR    Family has opted for palliative care d/t cancer spreading to spine  Pt and family coping as well as can be expected  Daughter at bedside    Chart orders reviewed. Bed in lowest position, wheels locked, call light within reach. Pt remains injury free. Will cont POC

## 2024-12-08 NOTE — PT/OT/SLP PROGRESS
Physical Therapy  Treatment    Katrina Duran   MRN: [id number]   Admitting Diagnosis: Closed fracture of third lumbar vertebra       PT Start Time: 1000     PT Stop Time: 1015    PT Total Time (min): 15 min       Billable Minutes:  Therapeutic Activity 15    Treatment Type: Treatment  PT/PTA: PTA     Number of PTA visits since last PT visit: 1       General Precautions: Standard, fall  Orthopedic Precautions: spinal precautions (TO MINIMIZE PAIN)  Braces: N/A  Respiratory Status: Room air         Subjective:  Communicated with DELANEY prior to session.      Pain/Comfort  Pain Rating 1: 10/10  Pain Rating Post-Intervention 1: 10/10    Treatment and Education:    FAMILY WAS EDUCATED ON THE FOLLOWING:     HOW TO ASSIST PATIENT WITH BED MOB  HOW TO PREPARE BED LINENS FOR REMOVAL/REPLACEMENT ONCE LINENS BECOME SOILED  POSITIONING BED TO APPROPRIATE HEIGHT OF CAREGIVERS TO AVOID UNNECESSARY LOW BACK STRAIN     AM-PAC 6 CLICK MOBILITY  How much help from another person does this patient currently need?   1 = Unable, Total/Dependent Assistance  2 = A lot, Maximum/Moderate Assistance  3 = A little, Minimum/Contact Guard/Supervision  4 = None, Modified [place]/Independent    Turning over in bed (including adjusting bedclothes, sheets and blankets)?: 1  Sitting down on and standing up from a chair with arms (e.g., wheelchair, bedside commode, etc.): 1  Moving from lying on back to sitting on the side of the bed?: 1  Moving to and from a bed to a chair (including a wheelchair)?: 1  Need to walk in hospital room?: 1  Climbing 3-5 steps with a railing?: 1  Basic Mobility Total Score: 6    AM-PAC Raw Score CMS G-Code Modifier Level of Impairment Assistance   6 % Total / Unable   7 - 9 CM 80 - 100% Maximal Assist   10 - 14 CL 60 - 80% Moderate Assist   15 - 19 CK 40 - 60% Moderate Assist   20 - 22 CJ 20 - 40% Minimal Assist   23 CI 1-20% SBA / CGA   24 CH 0% Independent/ Mod I     Patient left supine with all lines intact,  call button in reach, and DAUGHTER present.    Assessment:  Katrina Duran is a 84 y.o. female with a medical diagnosis of Closed fracture of third lumbar vertebra and presents with .    Rehab identified problem list/impairments: weakness, gait instability, decreased ROM, impaired cardiopulmonary response to activity, decreased lower extremity function, impaired balance, impaired endurance, impaired skin, pain, impaired self care skills, impaired functional mobility    Rehab potential is poor.    Activity tolerance: Poor    Discharge recommendations: No Therapy Indicated      Barriers to discharge:      Equipment recommendations: to be determined by next level of care     GOALS:   Multidisciplinary Problems       Physical Therapy Goals          Problem: Physical Therapy    Goal Priority Disciplines Outcome Interventions   Physical Therapy Goal     PT, PT/OT     Description: LTG'S TO BE MET IN 14 DAYS (12-18-24)  PT WILL REQUIRE ULISES FOR BED MOBILITY  PT WILL REQUIRE ULISES FOR SIT<>STAND TF USING RW  PT WILL REQUIRE ULISES FOR BED<>CHAIR TF USING RW  PT WILL INC AMPAC SCORE BY 2 POINTS TO PROGRESS GROSS FUNC MOBILITY                         PLAN:    Patient to be seen 3 x/week to address the above listed problems via therapeutic activities, therapeutic exercises  Plan of Care expires: 12/18/24  Plan of Care reviewed with: patient, daughter, grandchild(darryl)         12/08/2024

## 2024-12-08 NOTE — PROCEDURES
"Katrina Duran is a 84 y.o. female patient.    Temp: 98 °F (36.7 °C) (12/07/24 1953)  Pulse: 78 (12/07/24 2120)  Resp: 18 (12/07/24 2118)  BP: 129/60 (12/07/24 1953)  SpO2: (!) 93 % (12/07/24 1953)  Weight: 81.2 kg (179 lb) (12/07/24 1450)  Height: 5' 3" (160 cm) (12/07/24 1450)    PICC  Date/Time: 12/8/2024 12:01 AM  Performed by: Cristian Morrissey, RN  Consent Done: Yes  Time out: Immediately prior to procedure a time out was called to verify the correct patient, procedure, equipment, support staff and site/side marked as required  Indications: med administration and vascular access  Anesthesia: local infiltration  Local anesthetic: lidocaine 1% without epinephrine  Anesthetic Total (mL): 2  Preparation: skin prepped with chlorhexidine (without alcohol)  Skin prep agent dried: skin prep agent completely dried prior to procedure  Sterile barriers: all five maximum sterile barriers used - cap, mask, sterile gown, sterile gloves, and large sterile sheet  Hand hygiene: hand hygiene performed prior to central venous catheter insertion  Location details: left brachial  Catheter type: double lumen  Catheter size: 4 Fr  Catheter Length: 35cm    Ultrasound guidance: yes  Vessel Caliber: medium and patent, compressibility normal  Needle advanced into vessel with real time Ultrasound guidance.  Guidewire confirmed in vessel.  Sterile sheath used.  Number of attempts: 1  Post-procedure: blood return through all ports, chlorhexidine patch and sterile dressing applied  Specimens: No  Implants: No          Cristian Morrissey RN  12/8/2024    "

## 2024-12-08 NOTE — PROGRESS NOTES
[facility]  Progress Note    Patient Name: Katrina Duran  MRN: [id number]  Patient Class: IP- Inpatient   Admission Date: 12/3/2024  Length of Stay: 4 days  Attending Physician: Romulo Jones,*  Primary Care Provider: Liliya Freitas DO        Subjective     Principal Problem:Closed fracture of third lumbar vertebra        HPI:  Katrina Duran is a 84 y.o. female with PMHx of HTN, COPD, Lung Ca and CAD s/p CABG in 1999 and stent in 2006, brought to ER by EMS for severe lower back pain that radiates to her right leg since July of this year. Pt reports she is unable to stand and ambulate, like her legs are giving out upon standing and experiences excruciating pain when getting out of bed. Pt notes that prior to ED visit, she urinated at around 12:30 AM. She denies any numbness upon wiping. Pt's daughter reports she has not eaten in the last few days. Pt reports having a subjective fever but no measured oral temperature. She also c/o nausea. Pt denies any fall, cough, CP, SOB, dysuria, and abd pain.     Pt has hx lung cancer and received radiation in November of 2023. She had appointment with her PCP yesterday and was told her lung cancer has increased in size. Pt was prescribed 5 mg Percocet yesterday by her PCP to manage her pain. Pt was also seen in ED last Saturday. She has an upcoming appointment with pain management.      In the ER, VSS Afeb, labs showed WBC 12.4, H/H 11/35, Segs 92Na 134, HCo3 17, Bun/Cr  43/1.5. Lactate 1.8, BNP 1873. UA ++ve on 12/1/24.     CT L/S on 12/1-  subtle nondisplaced fracture involving the inferior, posterior and left lateral corner of the L3 vertebral body. Marked degenerative disc changes and degenerative facet arthropathy noted diffusely with marked convex left curvature of the lumbar spine.  Multilevel, multifactorial moderate to severe spinal canal stenosis and nerve root foramen narrowing, most significantly involving the L3/L4 disc level.      3.  Aortic stent graft in place, with similar amount of mild indistinction surrounding the stent graft when compared to the prior study, of doubtful clinical significance.    CTA Chest Abdomen and Pelvis: 12/1/24  1.  Detrimental change.  Interval increase in angular opacity within the lingula, likely a combination of partial collapse with possible underlying mass.  Interval increase in size of semi-solid right lower lobe nodule with increase central solid component, currently measuring 4.2 cm.  Interval development of a 7 mm spiculated nodule in the right lung apex.  Interval development of at least 2 hepatic masses measuring up to 3.9 cm.  These changes must be considered neoplastic in origin until proven otherwise.  A repeat PET CT scan would be helpful.  4.  Negative for pulmonary embolism, aneurysm or dissection.  Negative for intraperitoneal free air, free fluid or hemoperitoneum.    Hosp Med consulted and pt admitted for acute intractable Lumbar pain sec to L3 vertebral fracture and metastatic Lung Ca with B lung mets as well as Liver mets. Pt also has UTI, NICKI and Dehydration. Elevated BNP likely sec to NICKI. Pt started on IV rocephin, NS, Norco and IV MS for pain control. Will consult XRT for possible L/S Palliative Radiation. NSGY was also contacted and did not consider it a neurosurgical case.         Overview/Hospital Course:    12/4/24  Admitted for back pain x 1 week  Pain not well controlled, adjusted PRN regimen  Found to have L3 fracture  RN reports patient retained 650 cc overnight, required straight cath  Noted urinary retention again this afternoon  Shelton catheter ordered  ---  Patient with history of abdominal aorta repair with hardware.  Per MRI department, no documentation at this time to investigate if obtaining MRI is safe at this time.  Patient reportedly had an MRI done at outside facility several months ago, medical records pending.  MRI needed to evaluate for possible metastatic spread  in setting of underlying lung cancer.  Patient wishes to transfer her care to [facility]. Heme/Onc following.    12/5/24  Reviewed overnight events, transferred to ICU due to lethargy, hypoxia (lowest O2 sat 77%).  Possibly aspirated vomitus. BNP elevated 1800. IV fluids were stopped  Started on IV antibiotics, currently on Zosyn.  Currently on 2L NC  Stable to return to floor  ---  MRI lumbar spine was [facility] on Oct [facility]th 2024 [facility] in [place], [place] - phone number [phone].   There office will send the report to fax number in ICU - [phone].  Updated MRI/Radiology staff via secure chat.  Updated patient's family members in waiting room.    12/6/24  NAEON, patient states no pain when laying still  Severe pain with movement, controlled with current PRN regimen  Plans for MRI this evening  Currently no Neurosurgical coverage at this time  Discussed possibly transferring to [facility], pending MRI findings  Updated patient's family at bedside    12/07/2024   Patient in mild distress with ongoing pain   Alert and oriented   MRI thoracic, lumbar spine-findings suggestive of bone metastasis  Discussed findings with patient/son at bedside, daughters over phone-- given patient's age, underlying comorbidities, acute issues, opting to proceed with hospice; declined any further interventions, neurosurgical consultation at this point.    Open for possible palliative radiation therapy, consulted radiation oncologist per Heme-Onc recommendations   Consulted palliative for symptom management   Blood culture x2 as of 12/04/2024 positive for Gram-positive cocci, currently on empiric antibiotics, ordered for repeat blood cultures, id follow up     12/08/2024   Alert awake, oriented x3   Resting comfortably, complaining of intermittent pain, currently on pain regimen   Ordered x-ray left femur/hip per Radiation Oncology recommendations,; radiation oncology planning for trended to radiation therapy tomorrow ;  PICC line  ordered by overnight team due to poor venous access;   Consulted  for hospice   Repeat blood cultures from yesterday negative to date   Anticipate discharge in next 24-48 hours to home under hospice  Discussed plan of care with patient/daughter at bedside, agreed        Review of Systems  Objective:     Vital Signs (Most Recent):  Temp: 97.7 °F (36.5 °C) (12/08/24 0810)  Pulse: 78 (12/08/24 0810)  Resp: 20 (12/08/24 0839)  BP: (!) 158/67 (12/08/24 0810)  SpO2: (!) 92 % (12/08/24 0810) Vital Signs (24h Range):  Temp:  [97.7 °F (36.5 °C)-98.9 °F (37.2 °C)] 97.7 °F (36.5 °C)  Pulse:  [66-92] 78  Resp:  [15-20] 20  SpO2:  [90 %-93 %] 92 %  BP: (129-169)/(60-77) 158/67     Weight: 81.2 kg (179 lb)  Body mass index is 31.71 kg/m².    Intake/Output Summary (Last 24 hours) at 12/8/2024 1225  Last data filed at 12/8/2024 1015  Gross per 24 hour   Intake 560 ml   Output 1480 ml   Net -920 ml         Physical Exam        Constitutional:       General: She is not in acute distress.     Appearance: Normal appearance. She is normal weight. She is ill-appearing.   Cardiovascular:      Rate and Rhythm: Normal rate and regular rhythm.      Heart sounds: No murmur heard.  Pulmonary:      Effort: Pulmonary effort is normal. No respiratory distress.      Breath sounds: No wheezing.      Comments: Wearing 2 L NC  Genitourinary:     Comments: Shelton in place  Neurological:      Mental Status: She is alert.          Significant Labs: All pertinent labs within the past 24 hours have been reviewed.  CBC:   Recent Labs   Lab 12/07/24  0550   WBC 9.61   HGB 10.8*   HCT 33.1*        CMP:   Recent Labs   Lab 12/07/24  0550   *   K 4.5      CO2 20*   *   BUN 44*   CREATININE 1.1   CALCIUM 8.9   ANIONGAP 8       Significant Imaging:     Imaging Results              X-Ray Chest AP Portable (Final result)  Result time 12/03/24 13:08:01      Final result by Cam Obrien MD (12/03/24 13:08:01)                    Impression:      Worsening left-sided airspace opacity in comparison to the prior radiograph.  Underlying mass not excluded.      Electronically signed by: Cam Obrien  Date:    12/03/2024  Time:    13:08               Narrative:    EXAMINATION:  XR CHEST AP PORTABLE    CLINICAL HISTORY:  Low back pain, unspecified    TECHNIQUE:  Single frontal view of the chest was performed.    COMPARISON:  Multiple    FINDINGS:  Suspect worsening left-sided pulmonary opacity in comparison to the prior radiograph.  Underlying mass not excluded.  No pleural fluid or pneumothorax.    The cardiac silhouette is enlarged.  The hilar and mediastinal contours are unremarkable.    Bones are intact.                                       Assessment and Plan     * Closed fracture of third lumbar vertebra  Multimodal pain control  PT/OT  NSGY and Oncology consult  Prognosis poor on account of underlying Lung Ca    12/4/24  MRI spine ordered to evaluate for metastatic disease  Unable to obtain due to concerns for MRI safety  Medical records pending, patient reports undergoing MRI previously    12/5/24  MRI lumbar spine was done on Oct 4th 2024 at [facility] in [place], [place] - phone number [phone].   There office will send the report to fax number in icu - [phone].  Updated MRI/Radiology staff via secure chat.    Urinary retention  Urinary retention noted soon after admission  Bladder scan x 2 with high residuals  UA with evidence of infectious etiology  Possibly related to spinal fracture, MRI pending  Consider voiding trial over weekend      NICKI (acute kidney injury)  NICKI is likely due to pre-renal azotemia due to dehydration. Baseline creatinine is  1.1 . Most recent creatinine and eGFR are listed below.  Recent Labs     12/04/24  0515 12/04/24 2026 12/05/24  0318   CREATININE 1.5* 1.3 1.3   EGFRNORACEVR 34* 40* 40*        Plan  - NICKI is improving  - Avoid nephrotoxins and renally dose meds for GFR listed above  - Monitor  urine output, serial BMP, and adjust therapy as needed    Non-small cell lung cancer  Patient has Known metastatic cancer of Lung primary. The cancer has metastasized to Liver, opposite Lung and possibly Lumbar Spine. The patient is not under the care of an outpatient oncologist. The patient is not undergoing active chemotherapy. Their staging information is listed below.     Oncology following, [facility] MRI spine  Multimodal pain control      UTI (urinary tract infection)  As seen on UA 12/1- asymptomatic, no obvious dysuria sec to severe LBP  Will Treat with IVF and IV Rocephin  Urine Cx from 12/1- Neg    12/4/24  Ceftriaxone day # 2  Shelton placed due to urinary retention    12/5/24  Urine culture no growth  Ceftriaxone stopped      Pneumonia of left lung due to infectious organism  Patient has a diagnosis of pneumonia. The cause of the pneumonia is suspected to be bacterial in etiology but organism is not known. The pneumonia is worsening due to O2 requirement . The patient has the following signs/symptoms of pneumonia: persistent hypoxia . The patient does have a current oxygen requirement and the patient does not have a home oxygen requirement. I have reviewed the pertinent imaging. The following cultures have been collected: Blood cultures The culture results are listed below.     Current antimicrobial regimen consists of the antibiotics listed below. Will monitor patient closely and continue current treatment plan unchanged.    Antibiotics (From admission, onward)      Start     Stop Route Frequency Ordered    12/05/24 1230  piperacillin-tazobactam (ZOSYN) 4.5 g in D5W 100 mL IVPB (MB+)         -- IV Every 8 hours (non-standard times) 12/05/24 0939    12/05/24 1045  mupirocin 2 % ointment         12/10/24 0859 Nasl 2 times daily 12/05/24 0941            Microbiology Results (last 7 days)       Procedure Component Value Units Date/Time    Blood culture [[id number]] Collected: 12/04/24 1524    Order Status:  Completed Specimen: Blood Updated: 12/05/24 0915     Blood Culture, Routine No Growth to date    Narrative:      #2    Blood culture [[id number]] Collected: 12/04/24 2147    Order Status: Completed Specimen: Blood Updated: 12/05/24 0915     Blood Culture, Routine No Growth to date    Culture, MRSA [[id number]] Collected: 12/04/24 2029    Order Status: Sent Specimen: MRSA source from Nares, Left Updated: 12/05/24 0137    Respiratory Infection Panel (PCR), Nasopharyngeal [[id number]] Collected: 12/04/24 2031    Order Status: Completed Specimen: Nasopharyngeal Swab Updated: 12/05/24 0014     Respiratory Infection Panel Source NP swab     Adenovirus Not Detected     Coronavirus 229E, Common Cold Virus Not Detected     Coronavirus HKU1, Common Cold Virus Not Detected     Coronavirus NL63, Common Cold Virus Not Detected     Coronavirus OC43, Common Cold Virus Not Detected     Comment: The Coronavirus strains detected in this test cause the common cold.  These strains are not the COVID-19 (novel Coronavirus)strain   associated with the respiratory disease outbreak.          SARS-CoV2 (COVID-19) Qualitative PCR Not Detected     Human Metapneumovirus Not Detected     Human Rhinovirus/Enterovirus Not Detected     Influenza A (subtypes H1, H1-2009,H3) Not Detected     Influenza B Not Detected     Parainfluenza Virus 1 Not Detected     Parainfluenza Virus 2 Not Detected     Parainfluenza Virus 3 Not Detected     Parainfluenza Virus 4 Not Detected     Respiratory Syncytial Virus Not Detected     Bordetella Parapertussis (MU0823) Not Detected     Bordetella pertussis (ptxP) Not Detected     Chlamydia pneumoniae Not Detected     Mycoplasma pneumoniae Not Detected     Comment: Respiratory Infection Panel testing performed by Multiplex PCR.       Narrative:      Assay not valid for lower respiratory specimens, alternate  testing required.    Culture, Respiratory with Gram Stain [[id number]] Collected: 12/04/24 2029    Order Status:  Sent Specimen: Respiratory from Endotracheal Aspirate Updated: 12/04/24 2029              VTE Risk Mitigation (From admission, onward)           Ordered     heparin (porcine) injection 5,000 Units  Every 8 hours         12/03/24 1837     IP VTE HIGH RISK PATIENT  Once         12/03/24 1837     Place sequential compression device  Until discontinued         12/03/24 1837                    Discharge Planning   LACIE:      Code Status: DNR   Medical Readiness for Discharge Date:   Pending consult recommendations, cultures, anticipate discharge in next 24-48 hours  Discharge Plan A: Home with family                Please place Justification for DME        Romulo Jones MD  Department of Hospital Medicine   [facility] - Med Surg

## 2024-12-08 NOTE — PLAN OF CARE
FAMILY WAS EDUCATED ON THE FOLLOWING:     HOW TO ASSIST PATIENT WITH BED MOB  HOW TO PREPARE BED LINENS FOR REMOVAL/REPLACEMENT ONCE LINENS BECOME SOILED  POSITIONING BED TO APPROPRIATE HEIGHT OF CAREGIVERS TO AVOID UNNECESSARY LOW BACK STRAIN     Current every day smoker

## 2024-12-08 NOTE — SUBJECTIVE & OBJECTIVE
Review of Systems  Objective:     Vital Signs (Most Recent):  Temp: 97.7 °F (36.5 °C) (12/08/24 0810)  Pulse: 78 (12/08/24 0810)  Resp: 20 (12/08/24 0839)  BP: (!) 158/67 (12/08/24 0810)  SpO2: (!) 92 % (12/08/24 0810) Vital Signs (24h Range):  Temp:  [97.7 °F (36.5 °C)-98.9 °F (37.2 °C)] 97.7 °F (36.5 °C)  Pulse:  [66-92] 78  Resp:  [15-20] 20  SpO2:  [90 %-93 %] 92 %  BP: (129-169)/(60-77) 158/67     Weight: 81.2 kg (179 lb)  Body mass index is 31.71 kg/m².    Intake/Output Summary (Last 24 hours) at 12/8/2024 1225  Last data filed at 12/8/2024 1015  Gross per 24 hour   Intake 560 ml   Output 1480 ml   Net -920 ml         Physical Exam        Constitutional:       General: She is not in acute distress.     Appearance: Normal appearance. She is normal weight. She is ill-appearing.   Cardiovascular:      Rate and Rhythm: Normal rate and regular rhythm.      Heart sounds: No murmur heard.  Pulmonary:      Effort: Pulmonary effort is normal. No respiratory distress.      Breath sounds: No wheezing.      Comments: Wearing 2 L NC  Genitourinary:     Comments: Shelton in place  Neurological:      Mental Status: She is alert.          Significant Labs: All pertinent labs within the past 24 hours have been reviewed.  CBC:   Recent Labs   Lab 12/07/24  0550   WBC 9.61   HGB 10.8*   HCT 33.1*        CMP:   Recent Labs   Lab 12/07/24  0550   *   K 4.5      CO2 20*   *   BUN 44*   CREATININE 1.1   CALCIUM 8.9   ANIONGAP 8       Significant Imaging:     Imaging Results              X-Ray Chest AP Portable (Final result)  Result time 12/03/24 13:08:01      Final result by Cam Obrien MD (12/03/24 13:08:01)                   Impression:      Worsening left-sided airspace opacity in comparison to the prior radiograph.  Underlying mass not excluded.      Electronically signed by: Cam Obrien  Date:    12/03/2024  Time:    13:08               Narrative:    EXAMINATION:  XR CHEST AP  PORTABLE    CLINICAL HISTORY:  Low back pain, unspecified    TECHNIQUE:  Single frontal view of the chest was performed.    COMPARISON:  Multiple    FINDINGS:  Suspect worsening left-sided pulmonary opacity in comparison to the prior radiograph.  Underlying mass not excluded.  No pleural fluid or pneumothorax.    The cardiac silhouette is enlarged.  The hilar and mediastinal contours are unremarkable.    Bones are intact.

## 2024-12-08 NOTE — PLAN OF CARE
Met with patient, daughter Salma and daughter Shannon (by phone) to discuss hospice.  Provided education on home vs inpt hospice, equipment and medication.  Patient wishes to be in her home, does not want inpt hospice.  Agreeable to [facility] in [place]. Advised family that referral would be sent tomorrow, once agency open.  Family in agreement with plan.  MD updated.

## 2024-12-09 ENCOUNTER — DOCUMENTATION ONLY (OUTPATIENT)
Dept: RADIATION ONCOLOGY | Facility: CLINIC | Age: 84
End: 2024-12-09
Payer: MEDICARE

## 2024-12-09 PROBLEM — R78.81 ENTEROCOCCAL BACTEREMIA: Status: ACTIVE | Noted: 2024-12-09

## 2024-12-09 PROBLEM — B95.2 ENTEROCOCCAL BACTEREMIA: Status: ACTIVE | Noted: 2024-12-09

## 2024-12-09 PROBLEM — R11.2 NAUSEA & VOMITING: Status: ACTIVE | Noted: 2024-12-09

## 2024-12-09 PROBLEM — Z51.5 ENCOUNTER FOR PALLIATIVE CARE: Status: ACTIVE | Noted: 2024-12-09

## 2024-12-09 LAB — L PNEUMO AG UR QL IA: NEGATIVE

## 2024-12-09 PROCEDURE — 63600175 PHARM REV CODE 636 W HCPCS: Performed by: INTERNAL MEDICINE

## 2024-12-09 PROCEDURE — 77387 GUIDANCE FOR RADJ TX DLVR: CPT | Mod: TC | Performed by: SPECIALIST

## 2024-12-09 PROCEDURE — 77290 THER RAD SIMULAJ FIELD CPLX: CPT | Mod: TC | Performed by: SPECIALIST

## 2024-12-09 PROCEDURE — 99223 1ST HOSP IP/OBS HIGH 75: CPT | Mod: 25,,, | Performed by: NURSE PRACTITIONER

## 2024-12-09 PROCEDURE — 77263 THER RADIOLOGY TX PLNG CPLX: CPT | Mod: ,,, | Performed by: SPECIALIST

## 2024-12-09 PROCEDURE — 77417 THER RADIOLOGY PORT IMAGE(S): CPT | Performed by: SPECIALIST

## 2024-12-09 PROCEDURE — A4216 STERILE WATER/SALINE, 10 ML: HCPCS | Performed by: EMERGENCY MEDICINE

## 2024-12-09 PROCEDURE — G6002 STEREOSCOPIC X-RAY GUIDANCE: HCPCS | Mod: 26,,, | Performed by: SPECIALIST

## 2024-12-09 PROCEDURE — 77295 3-D RADIOTHERAPY PLAN: CPT | Mod: TC | Performed by: SPECIALIST

## 2024-12-09 PROCEDURE — 77014 HC CT GUIDANCE RADIATION THERAPY FLDS PLACEMENT: CPT | Mod: TC | Performed by: SPECIALIST

## 2024-12-09 PROCEDURE — 63600175 PHARM REV CODE 636 W HCPCS: Performed by: STUDENT IN AN ORGANIZED HEALTH CARE EDUCATION/TRAINING PROGRAM

## 2024-12-09 PROCEDURE — 77295 3-D RADIOTHERAPY PLAN: CPT | Mod: 26,,, | Performed by: SPECIALIST

## 2024-12-09 PROCEDURE — 25000003 PHARM REV CODE 250: Performed by: STUDENT IN AN ORGANIZED HEALTH CARE EDUCATION/TRAINING PROGRAM

## 2024-12-09 PROCEDURE — 25000003 PHARM REV CODE 250: Performed by: INTERNAL MEDICINE

## 2024-12-09 PROCEDURE — 27000221 HC OXYGEN, UP TO 24 HOURS

## 2024-12-09 PROCEDURE — 94799 UNLISTED PULMONARY SVC/PX: CPT

## 2024-12-09 PROCEDURE — 77290 THER RAD SIMULAJ FIELD CPLX: CPT | Mod: 26,,, | Performed by: SPECIALIST

## 2024-12-09 PROCEDURE — 77300 RADIATION THERAPY DOSE PLAN: CPT | Mod: 26,,, | Performed by: SPECIALIST

## 2024-12-09 PROCEDURE — 77334 RADIATION TREATMENT AID(S): CPT | Mod: 26,,, | Performed by: SPECIALIST

## 2024-12-09 PROCEDURE — 25000003 PHARM REV CODE 250

## 2024-12-09 PROCEDURE — 77334 RADIATION TREATMENT AID(S): CPT | Mod: TC | Performed by: SPECIALIST

## 2024-12-09 PROCEDURE — 99497 ADVNCD CARE PLAN 30 MIN: CPT | Mod: 25,,, | Performed by: NURSE PRACTITIONER

## 2024-12-09 PROCEDURE — 94761 N-INVAS EAR/PLS OXIMETRY MLT: CPT

## 2024-12-09 PROCEDURE — 25000242 PHARM REV CODE 250 ALT 637 W/ HCPCS

## 2024-12-09 PROCEDURE — DP0C2ZZ BEAM RADIATION OF OTHER BONE USING PHOTONS >10 MEV: ICD-10-PCS | Performed by: STUDENT IN AN ORGANIZED HEALTH CARE EDUCATION/TRAINING PROGRAM

## 2024-12-09 PROCEDURE — 99900035 HC TECH TIME PER 15 MIN (STAT)

## 2024-12-09 PROCEDURE — 99223 1ST HOSP IP/OBS HIGH 75: CPT | Mod: ,,, | Performed by: STUDENT IN AN ORGANIZED HEALTH CARE EDUCATION/TRAINING PROGRAM

## 2024-12-09 PROCEDURE — 77412 RADIATION TX DELIVERY LVL 3: CPT | Performed by: SPECIALIST

## 2024-12-09 PROCEDURE — 99233 SBSQ HOSP IP/OBS HIGH 50: CPT | Mod: ,,, | Performed by: INTERNAL MEDICINE

## 2024-12-09 PROCEDURE — 21400001 HC TELEMETRY ROOM

## 2024-12-09 PROCEDURE — 63600175 PHARM REV CODE 636 W HCPCS: Performed by: EMERGENCY MEDICINE

## 2024-12-09 PROCEDURE — 77300 RADIATION THERAPY DOSE PLAN: CPT | Mod: TC | Performed by: SPECIALIST

## 2024-12-09 PROCEDURE — 25000003 PHARM REV CODE 250: Performed by: EMERGENCY MEDICINE

## 2024-12-09 PROCEDURE — 94640 AIRWAY INHALATION TREATMENT: CPT

## 2024-12-09 PROCEDURE — 63600175 PHARM REV CODE 636 W HCPCS: Performed by: NURSE PRACTITIONER

## 2024-12-09 RX ORDER — MORPHINE SULFATE 2 MG/ML
0.5 INJECTION, SOLUTION INTRAMUSCULAR; INTRAVENOUS EVERY 6 HOURS PRN
Status: DISCONTINUED | OUTPATIENT
Start: 2024-12-09 | End: 2024-12-10 | Stop reason: HOSPADM

## 2024-12-09 RX ORDER — POLYETHYLENE GLYCOL 3350 17 G/17G
17 POWDER, FOR SOLUTION ORAL DAILY
Status: DISCONTINUED | OUTPATIENT
Start: 2024-12-09 | End: 2024-12-10 | Stop reason: HOSPADM

## 2024-12-09 RX ORDER — ONDANSETRON HYDROCHLORIDE 2 MG/ML
4 INJECTION, SOLUTION INTRAVENOUS EVERY 6 HOURS PRN
Status: DISCONTINUED | OUTPATIENT
Start: 2024-12-09 | End: 2024-12-10 | Stop reason: HOSPADM

## 2024-12-09 RX ORDER — AMOXICILLIN 250 MG
1 CAPSULE ORAL DAILY
Status: DISCONTINUED | OUTPATIENT
Start: 2024-12-09 | End: 2024-12-10 | Stop reason: HOSPADM

## 2024-12-09 RX ORDER — DEXAMETHASONE SODIUM PHOSPHATE 4 MG/ML
4 INJECTION, SOLUTION INTRA-ARTICULAR; INTRALESIONAL; INTRAMUSCULAR; INTRAVENOUS; SOFT TISSUE EVERY 24 HOURS
Status: DISCONTINUED | OUTPATIENT
Start: 2024-12-09 | End: 2024-12-10 | Stop reason: HOSPADM

## 2024-12-09 RX ADMIN — ARFORMOTEROL TARTRATE 15 MCG: 15 SOLUTION RESPIRATORY (INHALATION) at 08:12

## 2024-12-09 RX ADMIN — ATORVASTATIN CALCIUM 40 MG: 40 TABLET, FILM COATED ORAL at 11:12

## 2024-12-09 RX ADMIN — ONDANSETRON 8 MG: 8 TABLET, ORALLY DISINTEGRATING ORAL at 12:12

## 2024-12-09 RX ADMIN — BUDESONIDE INHALATION 0.5 MG: 0.5 SUSPENSION RESPIRATORY (INHALATION) at 08:12

## 2024-12-09 RX ADMIN — IPRATROPIUM BROMIDE AND ALBUTEROL SULFATE 3 ML: 2.5; .5 SOLUTION RESPIRATORY (INHALATION) at 08:12

## 2024-12-09 RX ADMIN — PANTOPRAZOLE SODIUM 40 MG: 40 TABLET, DELAYED RELEASE ORAL at 11:12

## 2024-12-09 RX ADMIN — MORPHINE SULFATE 0.5 MG: 2 INJECTION, SOLUTION INTRAMUSCULAR; INTRAVENOUS at 06:12

## 2024-12-09 RX ADMIN — DEXAMETHASONE SODIUM PHOSPHATE 4 MG: 4 INJECTION INTRA-ARTICULAR; INTRALESIONAL; INTRAMUSCULAR; INTRAVENOUS; SOFT TISSUE at 02:12

## 2024-12-09 RX ADMIN — OXYCODONE AND ACETAMINOPHEN 1 TABLET: 7.5; 325 TABLET ORAL at 11:12

## 2024-12-09 RX ADMIN — HYDROMORPHONE HYDROCHLORIDE 1 MG: 1 INJECTION, SOLUTION INTRAMUSCULAR; INTRAVENOUS; SUBCUTANEOUS at 02:12

## 2024-12-09 RX ADMIN — AMPICILLIN SODIUM AND SULBACTAM SODIUM 3 G: 2; 1 INJECTION, POWDER, FOR SOLUTION INTRAMUSCULAR; INTRAVENOUS at 07:12

## 2024-12-09 RX ADMIN — HYDROMORPHONE HYDROCHLORIDE 1 MG: 1 INJECTION, SOLUTION INTRAMUSCULAR; INTRAVENOUS; SUBCUTANEOUS at 08:12

## 2024-12-09 RX ADMIN — Medication 10 ML: at 05:12

## 2024-12-09 RX ADMIN — ONDANSETRON 4 MG: 2 INJECTION INTRAMUSCULAR; INTRAVENOUS at 02:12

## 2024-12-09 RX ADMIN — AMPICILLIN SODIUM AND SULBACTAM SODIUM 3 G: 2; 1 INJECTION, POWDER, FOR SOLUTION INTRAMUSCULAR; INTRAVENOUS at 06:12

## 2024-12-09 RX ADMIN — OXYCODONE AND ACETAMINOPHEN 1 TABLET: 7.5; 325 TABLET ORAL at 07:12

## 2024-12-09 RX ADMIN — POLYETHYLENE GLYCOL 3350 17 G: 17 POWDER, FOR SOLUTION ORAL at 02:12

## 2024-12-09 RX ADMIN — HEPARIN SODIUM 5000 UNITS: 5000 INJECTION INTRAVENOUS; SUBCUTANEOUS at 02:12

## 2024-12-09 RX ADMIN — Medication 10 ML: at 02:12

## 2024-12-09 RX ADMIN — GABAPENTIN 100 MG: 100 CAPSULE ORAL at 02:12

## 2024-12-09 RX ADMIN — GABAPENTIN 100 MG: 100 CAPSULE ORAL at 11:12

## 2024-12-09 RX ADMIN — HEPARIN SODIUM 5000 UNITS: 5000 INJECTION INTRAVENOUS; SUBCUTANEOUS at 05:12

## 2024-12-09 RX ADMIN — OXYCODONE AND ACETAMINOPHEN 1 TABLET: 7.5; 325 TABLET ORAL at 05:12

## 2024-12-09 RX ADMIN — SENNOSIDES AND DOCUSATE SODIUM 1 TABLET: 50; 8.6 TABLET ORAL at 02:12

## 2024-12-09 NOTE — PLAN OF CARE
PT pain effectively controlled this shift with alternating IV hydromorphone and PO oxycodone. PT became hypoxic in the afternoon while sleeping with an 02 of 85%. PT placed on 2L NC. Sats up to 95%. PT has still been unable to have a BM, however is also having very little PO intake. Daughter states she ate some omaira crackers and drank a boost today, which is an improvement from Pts intake yesterday.  PT stable at this time, A/Ox 4. No S/S Acute distress noted. Vital signs WDL. Bed low and locked. Side rails up times 2, Call light within reach. Safety Maintained.   Chart orders reviewed, will continue current Plan of Care.    Precautions :  Tele monitor: ; NSR  LBM:       Problem: Adult Inpatient Plan of Care  Goal: Plan of Care Review  Outcome: Progressing     Problem: Adult Inpatient Plan of Care  Goal: Optimal Comfort and Wellbeing  Outcome: Progressing     Problem: Pneumonia  Goal: Effective Oxygenation and Ventilation  Outcome: Progressing     Problem: Acute Kidney Injury/Impairment  Goal: Improved Oral Intake  Outcome: Progressing     Problem: Infection  Goal: Absence of Infection Signs and Symptoms  Outcome: Progressing     Problem: Pain Acute  Goal: Optimal Pain Control and Function  Outcome: Progressing

## 2024-12-09 NOTE — HPI
Katrina Duran is a 84 y.o. female with PMHx of HTN, COPD, Lung Ca and CAD s/p CABG in 1999 and stent in 2006, brought to ER by EMS for severe lower back pain that radiates to her right leg since July of this year. Pt reports she is unable to stand and ambulate, like her legs are giving out upon standing and experiences excruciating pain when getting out of bed. Pt notes that prior to ED visit, she urinated at around 12:30 AM. She denies any numbness upon wiping. Pt's daughter reports she has not eaten in the last few days. Pt reports having a subjective fever but no measured oral temperature. She also c/o nausea. Pt denies any fall, cough, CP, SOB, dysuria, and abd pain.      Pt has hx lung cancer and received radiation in November of 2023. She had appointment with her PCP yesterday and was told her lung cancer has increased in size. Pt was prescribed 5 mg Percocet yesterday by her PCP to manage her pain. Pt was also seen in ED last Saturday. She has an upcoming appointment with pain management.      In the ER, VSS Afeb, labs showed WBC 12.4, H/H 11/35, Segs 92Na 134, HCo3 17, Bun/Cr  43/1.5. Lactate 1.8, BNP 1873. UA ++ve on 12/1/24.      CT L/S on 12/1-  subtle nondisplaced fracture involving the inferior, posterior and left lateral corner of the L3 vertebral body. Marked degenerative disc changes and degenerative facet arthropathy noted diffusely with marked convex left curvature of the lumbar spine.  Multilevel, multifactorial moderate to severe spinal canal stenosis and nerve root foramen narrowing, most significantly involving the L3/L4 disc level.     3.  Aortic stent graft in place, with similar amount of mild indistinction surrounding the stent graft when compared to the prior study, of doubtful clinical significance.     CTA Chest Abdomen and Pelvis: 12/1/24  1.  Detrimental change.  Interval increase in angular opacity within the lingula, likely a combination of partial collapse with possible underlying  mass.  Interval increase in size of semi-solid right lower lobe nodule with increase central solid component, currently measuring 4.2 cm.  Interval development of a 7 mm spiculated nodule in the right lung apex.  Interval development of at least 2 hepatic masses measuring up to 3.9 cm.  These changes must be considered neoplastic in origin until proven otherwise.  A repeat PET CT scan would be helpful.  4.  Negative for pulmonary embolism, aneurysm or dissection.  Negative for intraperitoneal free air, free fluid or hemoperitoneum.     Hosp Med consulted and pt admitted for acute intractable Lumbar pain sec to L3 vertebral fracture and metastatic Lung Ca with B lung mets as well as Liver mets. Pt also has UTI, NICKI and Dehydration. Elevated BNP likely sec to NICKI. Pt started on IV rocephin, NS, Norco and IV MS for pain control. Will consult XRT for possible L/S Palliative Radiation. NSGY was also contacted and did not consider it a neurosurgical case.          Overview/Hospital Course:  12/4/24  Admitted for back pain x 1 week  Pain not well controlled, adjusted PRN regimen  Found to have L3 fracture  RN reports patient retained 650 cc overnight, required straight cath  Noted urinary retention again this afternoon  Shelton catheter ordered  ---  Patient with history of abdominal aorta repair with hardware.  Per MRI department, no documentation at this time to investigate if obtaining MRI is safe at this time.  Patient reportedly had an MRI done at outside facility several months ago, medical records pending.  MRI needed to evaluate for possible metastatic spread in setting of underlying lung cancer.  Patient wishes to transfer her care to [facility]. Heme/Onc following.    12/5/24  Reviewed overnight events, transferred to ICU due to lethargy, hypoxia (lowest O2 sat 77%).  Possibly aspirated vomitus. BNP elevated 1800. IV fluids were stopped  Started on IV antibiotics, currently on Zosyn.  Currently on 2L NC  Stable to  return to floor    MRI lumbar spine was [facility] on Oct 4th 2024 [facility] in [place], [place] - phone number [phone].   There office will send the report to fax number in icu - [phone].  Updated MRI/Radiology staff via secure chat.  Updated patient's family members in waiting room.     12/6/24  NAEON, patient states no pain when laying still  Severe pain with movement, controlled with current PRN regimen  Plans for MRI this evening  Currently no Neurosurgical coverage at this time  Discussed possibly transferring to [facility], pending MRI findings  Updated patient's family at bedside     12/07/2024   Patient in mild distress with ongoing pain   Alert and oriented   MRI thoracic, lumbar spine-findings suggestive of bone metastasis  Discussed findings with patient/son at bedside, daughters over phone-- given patient's age, underlying comorbidities, acute issues, opting to proceed with hospice; declined any further interventions, neurosurgical consultation at this point.    Open for possible palliative radiation therapy, consulted radiation oncologist per Heme-Onc recommendations   Consulted palliative for symptom management   Blood culture x2 as of 12/04/2024 positive for Gram-positive cocci, currently on empiric antibiotics, ordered for repeat blood cultures, id follow up      12/08/2024   Alert awake, oriented x3   Resting comfortably, complaining of intermittent pain, currently on pain regimen   Ordered x-ray left femur/hip per Radiation Oncology recommendations,; radiation oncology planning for trended to radiation therapy tomorrow ;  PICC line ordered by overnight team due to poor venous access;   Consulted  for hospice   Repeat blood cultures from yesterday negative to date   Anticipate discharge in next 24-48 hours to home under hospice  Discussed plan of care with patient/daughter at bedside, agreed    Oncology:  Metastasis to bone  Will defer to Radiation therapy for painful bone  metastasis for relief of discomfort and pain  Agree entirely with do not resuscitate order and referral to hospice once palliative radiation is completed  Widespread metastatic non-small cell lung carcinoma.  Patient has disease in lung liver.  She does not wish to take systemic therapy but does wish palliative radiation therapy to areas that are painful and bone.  I had very long discussion with her and her daughter at bedside strongly recommend hospice care she lives in the [facility] and will defer to  which hospice is have inpatient units associated with them [facility]   ID:  --Recommend discussing CTA with IR to ensure masses not indicative of abscess that may need aspiration; possible source of bacteremia   --TTE does not report vegetations   --Follow repeat blood cx for clearance; NGTD    --Palliative care following; plans to discharge on hospice  --Not OPAT candidate   --Recommend 14 day course of PO amoxicillin 1 g TID from negative blood cx; stop date 12/20/24  --Will schedule virtual follow up

## 2024-12-09 NOTE — CONSULTS
[facility] S[facility]  Palliative Medicine  Consult Note    Patient Name: Katrina Duran  MRN: [id number]  Admission Date: 12/3/2024  Hospital Length of Stay: 5 days  Code Status: DNR   Attending Provider: Romulo Jones,*  Consulting Provider: Valery Bass NP  Primary Care Physician: Liliya Freitas DO  Principal Problem:Closed fracture of third lumbar vertebra    Patient information was obtained from patient, relative(s), and primary team.      Consults  Assessment/Plan:     Oncology  Metastasis to bone  Palliative radiation today  Some increased pain to area  Rx dexamethasone IV      Neoplasm related pain  Hydromorphone, oxycodone PRN  Will add daily dexamethasone   Radiation today  Plans to admit to hospice upon discharge.    GI  Nausea & vomiting  Presumed r/t radiotherapy or possibly pain rx/constipation  Abd x-ray non obstructive  Will add IV dexamethasone.  Ondansetron PRN    Palliative Care  Encounter for palliative care  Goals of care: Support/comfort. She would like to die in her home  Advanced directive: DNR. Will defer [place] to hospice  HC POA: none. . Wants her children tos hare decision making  Symptoms: back pain, leg pain, nausea  Follow up: Will follow during this admission. Plans to discharge home with hospice and support of children.          Thank you for your consult. I will follow-up with patient. Please contact us if you have any additional questions.    Subjective:     HPI:   Katrina Duran is a 84 y.o. female with PMHx of HTN, COPD, Lung Ca and CAD s/p CABG in 1999 and stent in 2006, brought to ER by EMS for severe lower back pain that radiates to her right leg since July of this year. Pt reports she is unable to stand and ambulate, like her legs are giving out upon standing and experiences excruciating pain when getting out of bed. Pt notes that prior to ED visit, she urinated at around 12:30 AM. She denies any numbness upon wiping. Pt's daughter reports she has not eaten in  the last few days. Pt reports having a subjective fever but no measured oral temperature. She also c/o nausea. Pt denies any fall, cough, CP, SOB, dysuria, and abd pain.      Pt has hx lung cancer and received radiation in November of 2023. She had appointment with her PCP yesterday and was told her lung cancer has increased in size. Pt was prescribed 5 mg Percocet yesterday by her PCP to manage her pain. Pt was also seen in ED last Saturday. She has an upcoming appointment with pain management.      In the ER, VSS Afeb, labs showed WBC 12.4, H/H 11/35, Segs 92Na 134, HCo3 17, Bun/Cr  43/1.5. Lactate 1.8, BNP 1873. UA ++ve on 12/1/24.      CT L/S on 12/1-  subtle nondisplaced fracture involving the inferior, posterior and left lateral corner of the L3 vertebral body. Marked degenerative disc changes and degenerative facet arthropathy noted diffusely with marked convex left curvature of the lumbar spine.  Multilevel, multifactorial moderate to severe spinal canal stenosis and nerve root foramen narrowing, most significantly involving the L3/L4 disc level.     3.  Aortic stent graft in place, with similar amount of mild indistinction surrounding the stent graft when compared to the prior study, of doubtful clinical significance.     CTA Chest Abdomen and Pelvis: 12/1/24  1.  Detrimental change.  Interval increase in angular opacity within the lingula, likely a combination of partial collapse with possible underlying mass.  Interval increase in size of semi-solid right lower lobe nodule with increase central solid component, currently measuring 4.2 cm.  Interval development of a 7 mm spiculated nodule in the right lung apex.  Interval development of at least 2 hepatic masses measuring up to 3.9 cm.  These changes must be considered neoplastic in origin until proven otherwise.  A repeat PET CT scan would be helpful.  4.  Negative for pulmonary embolism, aneurysm or dissection.  Negative for intraperitoneal free air,  free fluid or hemoperitoneum.     Hosp Med consulted and pt admitted for acute intractable Lumbar pain sec to L3 vertebral fracture and metastatic Lung Ca with B lung mets as well as Liver mets. Pt also has UTI, NICKI and Dehydration. Elevated BNP likely sec to NICKI. Pt started on IV rocephin, NS, Norco and IV MS for pain control. Will consult XRT for possible L/S Palliative Radiation. NSGY was also contacted and did not consider it a neurosurgical case.          Overview/Hospital Course:  12/4/24  Admitted for back pain x 1 week  Pain not well controlled, adjusted PRN regimen  Found to have L3 fracture  RN reports patient retained 650 cc overnight, required straight cath  Noted urinary retention again this afternoon  S[place] catheter ordered  ---  Patient with history of abdominal aorta repair with hardware.  Per MRI department, no documentation at this time to investigate if obtaining MRI is safe at this time.  Patient reportedly had an MRI done at outside facility several months ago, medical records pending.  MRI needed to evaluate for possible metastatic spread in setting of underlying lung cancer.  Patient wishes to transfer her care to [facility]. Heme/Onc following.    12/5/24  Reviewed overnight events, transferred to ICU due to lethargy, hypoxia (lowest O2 sat 77%).  Possibly aspirated vomitus. BNP elevated 1800. IV fluids were stopped  Started on IV antibiotics, currently on Zosyn.  Currently on 2L NC  Stable to return to floor    MRI lumbar spine was done on Oct 4th 2024 at "[facility] in [place], [place] - phone number [phone].   There office will send the report to fax number in [facility] - [phone].  Updated MRI/Radiology staff via secure chat.  Updated patient's family members in waiting room.     12/6/24  NAEON, patient states no pain when laying still  Severe pain with movement, controlled with current PRN regimen  Plans for MRI this evening  Currently no Neurosurgical coverage at this time  Discussed  possibly transferring to [facility], pending MRI findings  Updated patient's family at bedside     12/07/2024   Patient in mild distress with ongoing pain   Alert and oriented   MRI thoracic, lumbar spine-findings suggestive of bone metastasis  Discussed findings with patient/son at bedside, daughters over phone-- given patient's age, underlying comorbidities, acute issues, opting to proceed with hospice; declined any further interventions, neurosurgical consultation at this point.    Open for possible palliative radiation therapy, consulted radiation oncologist per Heme-Onc recommendations   Consulted palliative for symptom management   Blood culture x2 as of 12/04/2024 positive for Gram-positive cocci, currently on empiric antibiotics, ordered for repeat blood cultures, id follow up      12/08/2024   Alert awake, oriented x3   Resting comfortably, complaining of intermittent pain, currently on pain regimen   Ordered x-ray left femur/hip per Radiation Oncology recommendations,; radiation oncology planning for trended to radiation therapy tomorrow ;  PICC line ordered by overnight team due to poor venous access;   Consulted  for hospice   Repeat blood cultures from yesterday negative to date   Anticipate discharge in next 24-48 hours to home under hospice  Discussed plan of care with patient/daughter at bedside, agreed    Oncology:  Metastasis to bone  Will defer to Radiation therapy for painful bone metastasis for relief of discomfort and pain  Agree entirely with do not resuscitate order and referral to hospice once palliative radiation is completed  Widespread metastatic non-small cell lung carcinoma.  Patient has disease in lung liver.  She does not wish to take systemic therapy but does wish palliative radiation therapy to areas that are painful and bone.  I had very long discussion with her and her daughter at bedside strongly recommend hospice care she lives in the [facility] and will defer  to  which hospice is have inpatient units associated with them [facility] Course:  No notes on file    Interval History: In bed, daughter Shannon at bedside. She is nauseated presently and vomited just prior to our visit. Began radiation this AM. Reports increased BLE pain this afternoon.     Dtr contributes to history. Reports very poor appetite for several weeks, weight loss and increased frailty. No BM in over a week despite laxatives.   She understands her cancer is not curable and states her plan is to go home with hospice - she wants to die at home. She lives in [place] and [facility]  since [facility] can coordinate [facility]P hospice in [facility] if required.     She lives alone. Her children plan to take shifts staying with Mrs. Duran. Mrs. Duran and her daughter's expressed priority is comfort. She had radiation tx this AM. Message sent to Radiation Onc to determine if this is single dose or series of tx recommended.       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    COPD (chronic obstructive pulmonary disease)     Coronary artery disease     Essential (primary) hypertension 12/15/2005    Heart attack     1999    Mass of lower lobe of right lung 11/1/2016    Simple chronic bronchitis 11/1/2016       Past Surgical History:   Procedure Laterality Date    APPENDECTOMY      BACK SURGERY      CORONARY ANGIOPLASTY WITH STENT PLACEMENT      HYSTERECTOMY      OTHER SURGICAL HISTORY      Abdominal Aneurysm    TONSILLECTOMY         Review of patient's allergies indicates:   Allergen Reactions    Codeine Nausea Only    Adhesive Itching and Blisters     tape  tape    Latex, natural rubber Itching       Medications:  Continuous Infusions:  Scheduled Meds:   albuterol-ipratropium  3 mL Nebulization Q6H WAKE     ampicillin-sulbactam  3 g Intravenous Q12H    arformoteroL  15 mcg Nebulization BID    atorvastatin  40 mg Oral Daily    budesonide  0.5 mg Nebulization Q12H    gabapentin  100 mg Oral TID    heparin (porcine)  5,000 Units Subcutaneous Q8H    LIDOcaine  1 patch Transdermal Q24H    morphine  2 mg Intravenous Once    mupirocin   Nasal BID    pantoprazole  40 mg Oral Daily    polyethylene glycol  17 g Oral Daily    senna-docusate 8.6-50 mg  1 tablet Oral Daily    sertraline  100 mg Oral QHS    sodium chloride 0.9%  10 mL Intravenous Q8H     PRN Meds:  Current Facility-Administered Medications:     acetaminophen, 650 mg, Oral, Q4H PRN    dextrose 10%, 12.5 g, Intravenous, PRN    dextrose 10%, 25 g, Intravenous, PRN    HYDROmorphone, 1 mg, Intravenous, Q4H PRN    influenza (adjuvanted), 0.5 mL, Intramuscular, Prior to discharge    melatonin, 6 mg, Oral, Nightly PRN    naloxone, 0.02 mg, Intravenous, PRN    ondansetron, 8 mg, Oral, Q8H PRN    ondansetron, 4 mg, Intravenous, Q6H PRN    oxyCODONE-acetaminophen, 1 tablet, Oral, Q4H PRN    polyethylene glycol, 17 g, Oral, Daily PRN    promethazine, 25 mg, Oral, Q6H PRN    senna-docusate 8.6-50 mg, 1 tablet, Oral, BID PRN    Family History       Problem Relation (Age of Onset)    Cancer Sister          Tobacco Use    Smoking status: Former     Current packs/day: 0.00     Types: Cigarettes     Quit date: 1999     Years since quittin.9    Smokeless tobacco: Never   Substance and Sexual Activity    Alcohol use: No    Drug use: No    Sexual activity: Never       Review of Systems   Constitutional:  Positive for activity change and appetite change.     Objective:     Vital Signs (Most Recent):  Temp: 98.2 °F (36.8 °C) (24 1239)  Pulse: 80 (24 1236)  Resp: 16 (24 1239)  BP: (!) 181/76 (24 1236)  SpO2: 95 % (24 1236) Vital Signs (24h Range):  Temp:  [97.4 °F (36.3 °C)-98.2 °F (36.8 °C)] 98.2 °F (36.8 °C)  Pulse:  [56-80] 80  Resp:  [16-18]  "16  SpO2:  [85 %-98 %] 95 %  BP: (132-181)/(63-84) 181/76     Weight: 81.2 kg (179 lb)  Body mass index is 31.71 kg/m².       Physical Exam       Palliative Exam    Advance Care Planning  Advance Care Planning       Significant Labs: All pertinent labs within the past 24 hours have been reviewed.  CBC:   Recent Labs   Lab 12/07/24  0550   WBC 9.61   HGB 10.8*   HCT 33.1*   MCV 82        BMP:  No results for input(s): "GLU", "NA", "K", "CL", "CO2", "BUN", "CREATININE", "CALCIUM", "MG" in the last 24 hours.  LFT:  Lab Results   Component Value Date    AST 18 12/06/2024    ALKPHOS 90 12/06/2024    BILITOT 0.5 12/06/2024     Albumin:   Albumin   Date Value Ref Range Status   12/06/2024 2.4 (L) 3.5 - 5.2 g/dL Final     Protein:   Total Protein   Date Value Ref Range Status   12/06/2024 5.4 (L) 6.0 - 8.4 g/dL Final     Lactic acid:   Lab Results   Component Value Date    LACTATE 2.2 12/04/2024    LACTATE 1.8 12/03/2024       Significant Imaging: I have reviewed all pertinent imaging results/findings within the past 24 hours.      I spent a total of 70 minutes on the day of the visit. This includes face to face time in discussion of goals of care, symptom assessment, coordination of care and emotional support.  This also includes non-face to face time preparing to see the patient (eg, review of tests/imaging), obtaining and/or reviewing separately obtained history, documenting clinical information in the electronic or other health record, independently interpreting results and communicating results to the patient/family/caregiver, or care coordinator.    Additional 25 min time spent on a voluntary advance care planning and /or goals of care discussion, providing emotional support, formulating and communicating prognosis and exploring burden/benefit of various approaches of treatment.       Valery Bass NP  Palliative Medicine  [facility] Med S[facility]              "

## 2024-12-09 NOTE — SUBJECTIVE & OBJECTIVE
Interval History: In bed, daughter Shannon at bedside. She is nauseated presently and vomited just prior to our visit. Began radiation this AM. Reports increased BLE pain this afternoon.     Dtr contributes to history. Reports very poor appetite for several weeks, weight loss and increased frailty. No BM in over a week despite laxatives.   She understands her cancer is not curable and states her plan is to go home with hospice - she wants to die at home. She lives in [place] and [facility]  since [facility] can coordinate I/P hospice in [facility] if required.     She lives alone. Her children plan to take shifts staying with Mrs. Duran. Mrs. Duran and her daughter's expressed priority is comfort. She had radiation tx this AM. Message sent to Radiation Onc to determine if this is single dose or series of tx recommended.       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    COPD (chronic obstructive pulmonary disease)     Coronary artery disease     Essential (primary) hypertension 12/15/2005    Heart attack     1999    Mass of lower lobe of right lung 11/1/2016    Simple chronic bronchitis 11/1/2016       Past Surgical History:   Procedure Laterality Date    APPENDECTOMY      BACK SURGERY      CORONARY ANGIOPLASTY WITH STENT PLACEMENT      HYSTERECTOMY      OTHER SURGICAL HISTORY      Abdominal Aneurysm    TONSILLECTOMY         Review of patient's allergies indicates:   Allergen Reactions    Codeine Nausea Only    Adhesive Itching and Blisters     tape  tape    Latex, natural rubber Itching       Medications:  Continuous Infusions:  Scheduled Meds:   albuterol-ipratropium  3 mL Nebulization Q6H WAKE    ampicillin-sulbactam  3 g Intravenous Q12H    arformoteroL  15 mcg Nebulization BID    atorvastatin  40 mg Oral Daily     budesonide  0.5 mg Nebulization Q12H    gabapentin  100 mg Oral TID    heparin (porcine)  5,000 Units Subcutaneous Q8H    LIDOcaine  1 patch Transdermal Q24H    morphine  2 mg Intravenous Once    mupirocin   Nasal BID    pantoprazole  40 mg Oral Daily    polyethylene glycol  17 g Oral Daily    senna-docusate 8.6-50 mg  1 tablet Oral Daily    sertraline  100 mg Oral QHS    sodium chloride 0.9%  10 mL Intravenous Q8H     PRN Meds:  Current Facility-Administered Medications:     acetaminophen, 650 mg, Oral, Q4H PRN    dextrose 10%, 12.5 g, Intravenous, PRN    dextrose 10%, 25 g, Intravenous, PRN    HYDROmorphone, 1 mg, Intravenous, Q4H PRN    influenza (adjuvanted), 0.5 mL, Intramuscular, Prior to discharge    melatonin, 6 mg, Oral, Nightly PRN    naloxone, 0.02 mg, Intravenous, PRN    ondansetron, 8 mg, Oral, Q8H PRN    ondansetron, 4 mg, Intravenous, Q6H PRN    oxyCODONE-acetaminophen, 1 tablet, Oral, Q4H PRN    polyethylene glycol, 17 g, Oral, Daily PRN    promethazine, 25 mg, Oral, Q6H PRN    senna-docusate 8.6-50 mg, 1 tablet, Oral, BID PRN    Family History       Problem Relation (Age of Onset)    Cancer Sister          Tobacco Use    Smoking status: Former     Current packs/day: 0.00     Types: Cigarettes     Quit date: 1999     Years since quittin.9    Smokeless tobacco: Never   Substance and Sexual Activity    Alcohol use: No    Drug use: No    Sexual activity: Never       Review of Systems   Constitutional:  Positive for activity change and appetite change.     Objective:     Vital Signs (Most Recent):  Temp: 98.2 °F (36.8 °C) (24 1239)  Pulse: 80 (24 1236)  Resp: 16 (24 1239)  BP: (!) 181/76 (24 1236)  SpO2: 95 % (24 123) Vital Signs (24h Range):  Temp:  [97.4 °F (36.3 °C)-98.2 °F (36.8 °C)] 98.2 °F (36.8 °C)  Pulse:  [56-80] 80  Resp:  [16-18] 16  SpO2:  [85 %-98 %] 95 %  BP: (132-181)/(63-84) 181/76     Weight: 81.2 kg (179 lb)  Body mass index is 31.71 kg/m².      "  Physical Exam       Palliative Exam    Advance Care Planning   Advance Care Planning       Significant Labs: All pertinent labs within the past 24 hours have been reviewed.  CBC:   Recent Labs   Lab 12/07/24  0550   WBC 9.61   HGB 10.8*   HCT 33.1*   MCV 82        BMP:  No results for input(s): "GLU", "NA", "K", "CL", "CO2", "BUN", "CREATININE", "CALCIUM", "MG" in the last 24 hours.  LFT:  Lab Results   Component Value Date    AST 18 12/06/2024    ALKPHOS 90 12/06/2024    BILITOT 0.5 12/06/2024     Albumin:   Albumin   Date Value Ref Range Status   12/06/2024 2.4 (L) 3.5 - 5.2 g/dL Final     Protein:   Total Protein   Date Value Ref Range Status   12/06/2024 5.4 (L) 6.0 - 8.4 g/dL Final     Lactic acid:   Lab Results   Component Value Date    LACTATE 2.2 12/04/2024    LACTATE 1.8 12/03/2024       Significant Imaging: I have reviewed all pertinent imaging results/findings within the past 24 hours.    "

## 2024-12-09 NOTE — ASSESSMENT & PLAN NOTE
CXR reported worsening left-sided pulmonary opacity in comparison to the prior radiograph. Underlying mass not excluded. No pleural fluid or pneumothorax. Will target E faecalis with amoxicillin.

## 2024-12-09 NOTE — ASSESSMENT & PLAN NOTE
Agree entirely with do not resuscitate order and referral to hospice once palliative radiation is completed

## 2024-12-09 NOTE — PT/OT/SLP PROGRESS
Physical Therapy      Patient Name:  Katrina Duran   MRN:  [id number]    Chart review performed and attempted but patient not seen today secondary to Off the floor for (imaging/MRI).(3447)

## 2024-12-09 NOTE — ASSESSMENT & PLAN NOTE
Presumed r/t radiotherapy or possibly pain rx/constipation  Abd x-ray non obstructive  Will add IV dexamethasone.  Ondansetron PRN

## 2024-12-09 NOTE — PROGRESS NOTES
[facility] Medicine  Progress Note    Patient Name: Katrina Duran  MRN: [id number]  Patient Class: IP- Inpatient   Admission Date: 12/3/2024  Length of Stay: 5 days  Attending Physician: Romulo Jones,*  Primary Care Provider: Liliya Freitas DO        Subjective     Principal Problem:Closed fracture of third lumbar vertebra        HPI:  Katrina Duran is a 84 y.o. female with PMHx of HTN, COPD, Lung Ca and CAD s/p CABG in 1999 and stent in 2006, brought to ER by EMS for severe lower back pain that radiates to her right leg since July of this year. Pt reports she is unable to stand and ambulate, like her legs are giving out upon standing and experiences excruciating pain when getting out of bed. Pt notes that prior to ED visit, she urinated at around 12:30 AM. She denies any numbness upon wiping. Pt's daughter reports she has not eaten in the last few days. Pt reports having a subjective fever but no measured oral temperature. She also c/o nausea. Pt denies any fall, cough, CP, SOB, dysuria, and abd pain.     Pt has hx lung cancer and received radiation in November of 2023. She had appointment with her PCP yesterday and was told her lung cancer has increased in size. Pt was prescribed 5 mg Percocet yesterday by her PCP to manage her pain. Pt was also seen in ED last Saturday. She has an upcoming appointment with pain management.      In the ER, VSS Afeb, labs showed WBC 12.4, H/H 11/35, Segs 92Na 134, HCo3 17, Bun/Cr  43/1.5. Lactate 1.8, BNP 1873. UA ++ve on 12/1/24.     CT L/S on 12/1-  subtle nondisplaced fracture involving the inferior, posterior and left lateral corner of the L3 vertebral body. Marked degenerative disc changes and degenerative facet arthropathy noted diffusely with marked convex left curvature of the lumbar spine.  Multilevel, multifactorial moderate to severe spinal canal stenosis and nerve root foramen narrowing, most significantly involving the L3/L4 disc level.      3.  Aortic stent graft in place, with similar amount of mild indistinction surrounding the stent graft when compared to the prior study, of doubtful clinical significance.    CTA Chest Abdomen and Pelvis: 12/1/24  1.  Detrimental change.  Interval increase in angular opacity within the lingula, likely a combination of partial collapse with possible underlying mass.  Interval increase in size of semi-solid right lower lobe nodule with increase central solid component, currently measuring 4.2 cm.  Interval development of a 7 mm spiculated nodule in the right lung apex.  Interval development of at least 2 hepatic masses measuring up to 3.9 cm.  These changes must be considered neoplastic in origin until proven otherwise.  A repeat PET CT scan would be helpful.  4.  Negative for pulmonary embolism, aneurysm or dissection.  Negative for intraperitoneal free air, free fluid or hemoperitoneum.    Hosp Med consulted and pt admitted for acute intractable Lumbar pain sec to L3 vertebral fracture and metastatic Lung Ca with B lung mets as well as Liver mets. Pt also has UTI, NICKI and Dehydration. Elevated BNP likely sec to NICKI. Pt started on IV rocephin, NS, Norco and IV MS for pain control. Will consult XRT for possible L/S Palliative Radiation. NSGY was also contacted and did not consider it a neurosurgical case.         Overview/Hospital Course:    12/4/24  Admitted for back pain x 1 week  Pain not well controlled, adjusted PRN regimen  Found to have L3 fracture  RN reports patient retained 650 cc overnight, required straight cath  Noted urinary retention again this afternoon  Shelton catheter ordered  ---  Patient with history of abdominal aorta repair with hardware.  Per MRI department, no documentation at this time to investigate if obtaining MRI is safe at this time.  Patient reportedly had an MRI done at outside facility several months ago, medical records pending.  MRI needed to evaluate for possible metastatic spread  in setting of underlying lung cancer.  Patient wishes to transfer her care to [facility]. Heme/Onc following.    12/5/24  Reviewed overnight events, transferred to ICU due to lethargy, hypoxia (lowest O2 sat 77%).  Possibly aspirated vomitus. BNP elevated 1800. IV fluids were stopped  Started on IV antibiotics, currently on Zosyn.  Currently on 2L NC  Stable to return to floor  ---  MRI lumbar spine was done on Oct 4th 2024 at [facility] in [place], [place] - phone number [phone].   There office will send the report to fax number in ICU - [phone].  Updated MRI/Radiology staff via secure chat.  Updated patient's family members in waiting room.    12/6/24  NAEON, patient states no pain when laying still  Severe pain with movement, controlled with current PRN regimen  Plans for MRI this evening  Currently no Neurosurgical coverage at this time  Discussed possibly transferring to [facility], pending MRI findings  Updated patient's family at bedside    12/07/2024   Patient in mild distress with ongoing pain   Alert and oriented   MRI thoracic, lumbar spine-findings suggestive of bone metastasis  Discussed findings with patient/son at bedside, daughters over phone-- given patient's age, underlying comorbidities, acute issues, opting to proceed with hospice; declined any further interventions, neurosurgical consultation at this point.    Open for possible palliative radiation therapy, consulted radiation oncologist per Heme-Onc recommendations   Consulted palliative for symptom management   Blood culture x2 as of 12/04/2024 positive for Gram-positive cocci, currently on empiric antibiotics, ordered for repeat blood cultures, id follow up     12/08/2024   Alert awake, oriented x3   Resting comfortably, complaining of intermittent pain, currently on pain regimen   Ordered x-ray left femur/hip per Radiation Oncology recommendations,; radiation oncology planning for trended to radiation therapy tomorrow   Consulted  " for hospice   Repeat blood cultures from yesterday negative to date   Anticipate discharge in next 24-48 hours to home under hospice  Discussed plan of care with patient/daughter at bedside, agreed    12/9/24  Status post palliative radiation today, followed by pain, nausea-- currently on Decadron, adjusted pain medications, ordered IV antiemetics   Given significant pain, nausea, patient/family prefers to be monitored overnight, based on symptom improvement, we will aim for discharge in next 24-48 hours          Review of Systems      Objective:     Vital Signs (Most Recent):  Temp: 98.2 °F (36.8 °C) (12/09/24 1239)  Pulse: 80 (12/09/24 1236)  Resp: 18 (12/09/24 1455)  BP: (!) 181/76 (12/09/24 1236)  SpO2: 95 % (12/09/24 1236) Vital Signs (24h Range):  Temp:  [97.4 °F (36.3 °C)-98.2 °F (36.8 °C)] 98.2 °F (36.8 °C)  Pulse:  [56-80] 80  Resp:  [16-18] 18  SpO2:  [85 %-98 %] 95 %  BP: (132-181)/(63-84) 181/76     Weight: 81.2 kg (179 lb)  Body mass index is 31.71 kg/m².    Intake/Output Summary (Last 24 hours) at 12/9/2024 1455  Last data filed at 12/9/2024 0527  Gross per 24 hour   Intake 320 ml   Output 1450 ml   Net -1130 ml         Physical Exam      Constitutional:       General: She is not in acute distress.     Appearance: Normal appearance. She is normal weight. She is ill-appearing.   Cardiovascular:      Rate and Rhythm: Normal rate and regular rhythm.      Heart sounds: No murmur heard.  Pulmonary:      Effort: Pulmonary effort is normal. No respiratory distress.      Breath sounds: No wheezing.      Comments: Wearing 2 L NC  Genitourinary:     Comments: Shelton in place  Neurological:      Mental Status: She is alert  Significant Labs: All pertinent labs within the past 24 hours have been reviewed.  CBC: No results for input(s): "WBC", "HGB", "HCT", "PLT" in the last 48 hours.  CMP: No results for input(s): "NA", "K", "CL", "CO2", "GLU", "BUN", "CREATININE", "CALCIUM", "PROT", "ALBUMIN", " ""BILITOT", "ALKPHOS", "AST", "ALT", "ANIONGAP", "EGFRNONAA" in the last 48 hours.    Invalid input(s): "ESTGFAFRICA"    Significant Imaging:     Imaging Results              X-Ray Chest AP Portable (Final result)  Result time 12/03/24 13:08:01      Final result by Cam Obrien MD (12/03/24 13:08:01)                   Impression:      Worsening left-sided airspace opacity in comparison to the prior radiograph.  Underlying mass not excluded.      Electronically signed by: Cam Obrien  Date:    12/03/2024  Time:    13:08               Narrative:    EXAMINATION:  XR CHEST AP PORTABLE    CLINICAL HISTORY:  Low back pain, unspecified    TECHNIQUE:  Single frontal view of the chest was performed.    COMPARISON:  Multiple    FINDINGS:  Suspect worsening left-sided pulmonary opacity in comparison to the prior radiograph.  Underlying mass not excluded.  No pleural fluid or pneumothorax.    The cardiac silhouette is enlarged.  The hilar and mediastinal contours are unremarkable.    Bones are intact.                                       Assessment and Plan     * Closed fracture of third lumbar vertebra  Multimodal pain control  PT/OT  NSGY and Oncology consult  Prognosis poor on account of underlying Lung Ca    12/4/24  MRI spine ordered to evaluate for metastatic disease  Unable to obtain due to concerns for MRI safety  Medical records pending, patient reports undergoing MRI previously    12/5/24  MRI lumbar spine was done on Oct 4th 2024 at [facility] in [place], [place] - phone number [phone].   There office will send the report to fax number in icu - [phone].  Updated MRI/Radiology staff via secure chat.    Urinary retention  Urinary retention noted soon after admission  Bladder scan x 2 with high residuals  UA with evidence of infectious etiology  Possibly related to spinal fracture, MRI pending  Consider voiding trial over weekend      NICKI (acute kidney injury)  NICKI is likely due to pre-renal azotemia due " to dehydration. Baseline creatinine is  1.1 . Most recent creatinine and eGFR are listed below.  Recent Labs     12/04/24  0515 12/04/24 2026 12/05/24  0318   CREATININE 1.5* 1.3 1.3   EGFRNORACEVR 34* 40* 40*        Plan  - NICKI is improving  - Avoid nephrotoxins and renally dose meds for GFR listed above  - Monitor urine output, serial BMP, and adjust therapy as needed    Non-small cell lung cancer  Patient has Known metastatic cancer of Lung primary. The cancer has metastasized to Liver, opposite Lung and possibly Lumbar Spine. The patient is not under the care of an outpatient oncologist. The patient is not undergoing active chemotherapy. Their staging information is listed below.     Oncology following, [facility] MRI spine  Multimodal pain control      UTI (urinary tract infection)  As seen on UA 12/1- asymptomatic, no obvious dysuria sec to severe LBP  Will Treat with IVF and IV Rocephin  Urine Cx from 12/1- Neg    12/4/24  Ceftriaxone day # 2  Shelton placed due to urinary retention    12/5/24  Urine culture no growth  Ceftriaxone stopped      Pneumonia of left lung due to infectious organism  Patient has a diagnosis of pneumonia. The cause of the pneumonia is suspected to be bacterial in etiology but organism is not known. The pneumonia is worsening due to O2 requirement . The patient has the following signs/symptoms of pneumonia: persistent hypoxia . The patient does have a current oxygen requirement and the patient does not have a home oxygen requirement. I have reviewed the pertinent imaging. The following cultures have been collected: Blood cultures The culture results are listed below.     Current antimicrobial regimen consists of the antibiotics listed below. Will monitor patient closely and continue current treatment plan unchanged.    Antibiotics (From admission, onward)      Start     Stop Route Frequency Ordered    12/05/24 1230  piperacillin-tazobactam (ZOSYN) 4.5 g in D5W 100 mL IVPB (MB+)         -- IV  Every 8 hours (non-standard times) 12/05/24 0939    12/05/24 1045  mupirocin 2 % ointment         12/10/24 0859 Nasl 2 times daily 12/05/24 0941            Microbiology Results (last 7 days)       Procedure Component Value Units Date/Time    Blood culture [[id number]] Collected: 12/04/24 2147    Order Status: Completed Specimen: Blood Updated: 12/05/24 0915     Blood Culture, Routine No Growth to date    Narrative:      #2    Blood culture [[id number]] Collected: 12/04/24 2147    Order Status: Completed Specimen: Blood Updated: 12/05/24 0915     Blood Culture, Routine No Growth to date    Culture, MRSA [[id number]] Collected: 12/04/24 2029    Order Status: Sent Specimen: MRSA source from Nares, Left Updated: 12/05/24 0137    Respiratory Infection Panel (PCR), Nasopharyngeal [[id number]] Collected: 12/04/24 2031    Order Status: Completed Specimen: Nasopharyngeal Swab Updated: 12/05/24 0014     Respiratory Infection Panel Source NP swab     Adenovirus Not Detected     Coronavirus 229E, Common Cold Virus Not Detected     Coronavirus HKU1, Common Cold Virus Not Detected     Coronavirus NL63, Common Cold Virus Not Detected     Coronavirus OC43, Common Cold Virus Not Detected     Comment: The Coronavirus strains detected in this test cause the common cold.  These strains are not the COVID-19 (novel Coronavirus)strain   associated with the respiratory disease outbreak.          SARS-CoV2 (COVID-19) Qualitative PCR Not Detected     Human Metapneumovirus Not Detected     Human Rhinovirus/Enterovirus Not Detected     Influenza A (subtypes H1, H1-2009,H3) Not Detected     Influenza B Not Detected     Parainfluenza Virus 1 Not Detected     Parainfluenza Virus 2 Not Detected     Parainfluenza Virus 3 Not Detected     Parainfluenza Virus 4 Not Detected     Respiratory Syncytial Virus Not Detected     Bordetella Parapertussis (LR9698) Not Detected     Bordetella pertussis (ptxP) Not Detected     Chlamydia pneumoniae Not Detected      Mycoplasma pneumoniae Not Detected     Comment: Respiratory Infection Panel testing performed by Multiplex PCR.       Narrative:      Assay not valid for lower respiratory specimens, alternate  testing required.    Culture, Respiratory with Gram Stain [[id number]] Collected: 12/04/24 2029    Order Status: Sent Specimen: Respiratory from Endotracheal Aspirate Updated: 12/04/24 2029              VTE Risk Mitigation (From admission, onward)           Ordered     heparin (porcine) injection 5,000 Units  Every 8 hours         12/03/24 1837     IP VTE HIGH RISK PATIENT  Once         12/03/24 1837     Place sequential compression device  Until discontinued         12/03/24 1837                    Discharge Planning   LACIE: 12/9/2024     Code Status: DNR   Medical Readiness for Discharge Date:  Pending clinical improvement  Discharge Plan A: Home with family                Please place Justification for DME        Romulo Jones MD  Department of Hospital Medicine   [facility] - Med Surg

## 2024-12-09 NOTE — ASSESSMENT & PLAN NOTE
Goals of care: Support/comfort. She would like to die in her home  Advanced directive: DNR. Will defer [place] to hospice  HC POA: none. . Wants her children tos harchante decision making  Symptoms: back pain, leg pain, nausea  Follow up: Will follow during this admission. Plans to discharge home with hospice and support of children.

## 2024-12-09 NOTE — PLAN OF CARE
12/09/24 1734   Rounds   Attendance Provider;Nurse ;Charge nurse;Physical therapist   Discharge Plan A Hospice/home   Why the patient remains in the hospital Requires continued medical care   Transition of Care Barriers None     Plan discharge home with [facility] tomorrow, 12/10.

## 2024-12-09 NOTE — PLAN OF CARE
Pt is stable, no Sx of acute distress  Pain controlled with current regimen     PICC line in place   Abx given as ordered   Catheter for urinary retention   Discharge pending palliative care facility, family is agreeable to a place in [place]     Cardiac monitor: [id number]; NS    Chart orders reviewed. Bed in lowest position, wheels locked, call light within reach. Pt remains injury free. Will cont POC

## 2024-12-09 NOTE — ASSESSMENT & PLAN NOTE
"Per review of heme/onc note, "widespread metastatic non-small cell lung carcinoma. Patient has disease in lung and liver. She does not wish to take systemic therapy but does wish palliative radiation therapy to areas that are painful and bone." Plans to discharge to hospice.   "

## 2024-12-09 NOTE — SUBJECTIVE & OBJECTIVE
Interval History:  patient is very weak able to talk to daughter at bedside    Oncology Treatment Plan:   [No matching plan found]    Medications:  Continuous Infusions:  Scheduled Meds:   albuterol-ipratropium  3 mL Nebulization Q6H WAKE    ampicillin-sulbactam  3 g Intravenous Q12H    arformoteroL  15 mcg Nebulization BID    atorvastatin  40 mg Oral Daily    budesonide  0.5 mg Nebulization Q12H    gabapentin  100 mg Oral TID    heparin (porcine)  5,000 Units Subcutaneous Q8H    LIDOcaine  1 patch Transdermal Q24H    morphine  2 mg Intravenous Once    mupirocin   Nasal BID    pantoprazole  40 mg Oral Daily    sertraline  100 mg Oral QHS    sodium chloride 0.9%  10 mL Intravenous Q8H     PRN Meds:  Current Facility-Administered Medications:     acetaminophen, 650 mg, Oral, Q4H PRN    dextrose 10%, 12.5 g, Intravenous, PRN    dextrose 10%, 25 g, Intravenous, PRN    HYDROmorphone, 1 mg, Intravenous, Q4H PRN    influenza (adjuvanted), 0.5 mL, Intramuscular, Prior to discharge    melatonin, 6 mg, Oral, Nightly PRN    naloxone, 0.02 mg, Intravenous, PRN    ondansetron, 8 mg, Oral, Q8H PRN    oxyCODONE-acetaminophen, 1 tablet, Oral, Q4H PRN    polyethylene glycol, 17 g, Oral, Daily PRN    promethazine, 25 mg, Oral, Q6H PRN    senna-docusate 8.6-50 mg, 1 tablet, Oral, BID PRN     Review of Systems   Constitutional:  Positive for activity change, appetite change and fatigue. Negative for chills, diaphoresis, fever and unexpected weight change.   HENT:  Negative for congestion, dental problem, drooling, ear discharge, ear pain, facial swelling, hearing loss, mouth sores, nosebleeds, postnasal drip, rhinorrhea, sinus pressure, sneezing, sore throat, tinnitus, trouble swallowing and voice change.    Eyes:  Negative for photophobia, pain, discharge, redness, itching and visual disturbance.   Respiratory:  Negative for cough, choking, chest tightness, shortness of breath, wheezing and stridor.    Cardiovascular:  Negative for  chest pain, palpitations and leg swelling.   Gastrointestinal:  Negative for abdominal distention, abdominal pain, anal bleeding, blood in stool, constipation, diarrhea, nausea, rectal pain and vomiting.   Endocrine: Negative for cold intolerance, heat intolerance, polydipsia, polyphagia and polyuria.   Genitourinary:  Negative for decreased urine volume, difficulty urinating, dyspareunia, dysuria, enuresis, flank pain, frequency, genital sores, hematuria, menstrual problem, pelvic pain, urgency, vaginal bleeding, vaginal discharge and vaginal pain.   Musculoskeletal:  Positive for arthralgias and back pain. Negative for gait problem, joint swelling, myalgias, neck pain and neck stiffness.   Skin:  Negative for color change, pallor and rash.   Allergic/Immunologic: Negative for environmental allergies, food allergies and immunocompromised state.   Neurological:  Positive for weakness. Negative for dizziness, tremors, seizures, syncope, facial asymmetry, speech difficulty, light-headedness, numbness and headaches.   Hematological:  Negative for adenopathy. Does not bruise/bleed easily.   Psychiatric/Behavioral:  Positive for dysphoric mood. Negative for agitation, behavioral problems, confusion, decreased concentration, hallucinations, self-injury, sleep disturbance and suicidal ideas. The patient is nervous/anxious. The patient is not hyperactive.      Objective:     Vital Signs (Most Recent):  Temp: 97.4 °F (36.3 °C) (12/09/24 0456)  Pulse: 64 (12/09/24 0527)  Resp: 18 (12/09/24 0526)  BP: (!) 146/84 (12/09/24 0456)  SpO2: 98 % (12/09/24 0456) Vital Signs (24h Range):  Temp:  [97.4 °F (36.3 °C)-97.9 °F (36.6 °C)] 97.4 °F (36.3 °C)  Pulse:  [56-88] 64  Resp:  [15-20] 18  SpO2:  [85 %-98 %] 98 %  BP: (132-167)/(63-84) 146/84     Weight: 81.2 kg (179 lb)  Body mass index is 31.71 kg/m².  Body surface area is 1.9 meters squared.      Intake/Output Summary (Last 24 hours) at 12/9/2024 0739  Last data filed at 12/9/2024  0527  Gross per 24 hour   Intake 500 ml   Output 1930 ml   Net -1430 ml        Physical Exam  Vitals reviewed.   Constitutional:       General: She is not in acute distress.     Appearance: She is well-developed. She is cachectic. She is ill-appearing. She is not diaphoretic.   HENT:      Head: Normocephalic and atraumatic.      Right Ear: External ear normal.      Left Ear: External ear normal.      Nose: Nose normal.      Right Sinus: No maxillary sinus tenderness or frontal sinus tenderness.      Left Sinus: No maxillary sinus tenderness or frontal sinus tenderness.      Mouth/Throat:      Pharynx: No oropharyngeal exudate.   Eyes:      General: Lids are normal. No scleral icterus.        Right eye: No discharge.         Left eye: No discharge.      Conjunctiva/sclera: Conjunctivae normal.      Right eye: Right conjunctiva is not injected. No hemorrhage.     Left eye: Left conjunctiva is not injected. No hemorrhage.     Pupils: Pupils are equal, round, and reactive to light.   Neck:      Thyroid: No thyromegaly.      Vascular: No JVD.      Trachea: No tracheal deviation.   Cardiovascular:      Rate and Rhythm: Normal rate.   Pulmonary:      Effort: Pulmonary effort is normal. No respiratory distress.      Breath sounds: Normal breath sounds. No stridor.   Chest:      Chest wall: No tenderness.   Abdominal:      General: Bowel sounds are normal. There is no distension.      Palpations: Abdomen is soft. There is no hepatomegaly, splenomegaly or mass.      Tenderness: There is no abdominal tenderness. There is no rebound.   Musculoskeletal:         General: No tenderness. Normal range of motion.      Cervical back: Normal range of motion and neck supple.   Lymphadenopathy:      Cervical: No cervical adenopathy.      Upper Body:      Right upper body: No supraclavicular adenopathy.      Left upper body: No supraclavicular adenopathy.   Skin:     General: Skin is dry.      Findings: No erythema or rash.  "  Neurological:      Mental Status: She is alert and oriented to person, place, and time.      Cranial Nerves: No cranial nerve deficit.      Motor: Weakness present.      Coordination: Coordination abnormal.      Gait: Gait abnormal.   Psychiatric:         Behavior: Behavior normal.         Thought Content: Thought content normal.         Judgment: Judgment normal.          Significant Labs:   BMP: No results for input(s): "GLU", "NA", "K", "CL", "CO2", "BUN", "CREATININE", "CALCIUM", "MG" in the last 48 hours.    Diagnostic Results:  I have reviewed all pertinent imaging results/findings within the past 24 hours.  "

## 2024-12-09 NOTE — PT/OT/SLP PROGRESS
Occupational Therapy      Patient Name:  Katrina Duran   MRN:  [id number]    Patient not seen today secondary to Nurse/ EDDI hold (due to pt inprocedure). Will follow-up at later time.    12/9/2024  Aranza Umanzor, OT  [id number]

## 2024-12-09 NOTE — CONSULTS
"[facility]  Infectious Disease  Consult Note    Patient Name: Katrina Duran  MRN: [id number]  Admission Date: 12/3/2024  Hospital Length of Stay: 5 days  Attending Physician: Romulo Jones,*  Primary Care Provider: Liliya Freitas DO     Isolation Status: No active isolations    Patient information was obtained from patient, relative(s), ER records, and primary team.      Inpatient consult to Infectious Diseases  Consult performed by: Kale Mcwilliams DO  Consult ordered by: Romulo Jones MD        Assessment/Plan:     Pulmonary  Pneumonia of left lung due to infectious organism  CXR reported worsening left-sided pulmonary opacity in comparison to the prior radiograph. Underlying mass not excluded. No pleural fluid or pneumothorax. Will target E faecalis with amoxicillin.     Cardiac/Vascular  HLD (hyperlipidemia)  Continue current medications per primary      ID  Enterococcal bacteremia  --All cases of Enterococcal bacteremia require source control and workup for metastatic infection; pose risk of life threatening sepsis  --E faecalis commonly S to ampicillin  --Follow initial blood cx for susceptibilities  --Continue IV amp/sulbactam while inpatient   --Recommend discussing CTA with IR to ensure masses not indicative of abscess that may need aspiration; possible source of bacteremia   --TTE does not report vegetations   --Follow repeat blood cx for clearance; NGTD    --Palliative care following; plans to discharge on hospice  --Not OPAT candidate   --Recommend 14 day course of PO amoxicillin 1 g TID from negative blood cx; stop date 12/20/24  --Will schedule virtual follow up     Oncology  Adenocarcinoma of left lung  Per review of heme/onc note, "widespread metastatic non-small cell lung carcinoma. Patient has disease in lung and liver. She does not wish to take systemic therapy but does wish palliative radiation therapy to areas that are painful and bone." Plans to discharge to " hospice.     GI  Liver lesion  See lung cancer        Thank you for your consult. I will sign off. Please contact us if you have any additional questions.    Kale Mcwilliams, DO  Infectious Disease  [facility]    Subjective:     Principal Problem: Closed fracture of third lumbar vertebra    HPI: This is an 83 yo F notably with metastatic lung cancer involving both lungs, COPD, HTN, AAA w/stent graft, and CAD s/p CABG & PCI w/stent who presented for low back pain. Found to have metastatic lesions of thoracic and lumbar spine. No plans for surgery. Palliative discussion underway. Had stay in ICU due to hypoxia and need for close monitoring. Febrile to 101.7 F on 12/4. Blood cultures growing E faecalis per BCID. On empiric Unasyn after 2 days of Zosyn. CTA c/a/p reported interval development of multiple inferior right hepatic lobe masses measuring up to 3.9 cm. No new opacities in lungs. ID consulted for bacteremia.     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    COPD (chronic obstructive pulmonary disease)     Coronary artery disease     Essential (primary) hypertension 12/15/2005    Heart attack     1999    Mass of lower lobe of right lung 11/1/2016    Simple chronic bronchitis 11/1/2016       Past Surgical History:   Procedure Laterality Date    APPENDECTOMY      BACK SURGERY      CORONARY ANGIOPLASTY WITH STENT PLACEMENT      HYSTERECTOMY      OTHER SURGICAL HISTORY      Abdominal Aneurysm    TONSILLECTOMY         Review of patient's allergies indicates:   Allergen Reactions    Codeine Nausea Only    Adhesive Itching and Blisters     tape  tape    Latex, natural rubber Itching       Medications:  Medications Prior to Admission   Medication Sig    albuterol-ipratropium (DUO-NEB) 2.5 mg-0.5 mg/3 mL nebulizer solution Take 3 mLs by  nebulization every 6 (six) hours as needed for Wheezing. Rescue    fluticasone-umeclidin-vilanter (TRELEGY ELLIPTA) 100-62.5-25 mcg DsDv Inhale 1 puff into the lungs every morning.    gabapentin (NEURONTIN) 100 MG capsule Take 1 capsule (100 mg total) by mouth 3 (three) times daily.    losartan (COZAAR) 100 MG tablet TAKE 1 TABLET(100 MG) BY MOUTH EVERY DAY (Patient taking differently: Take 100 mg by mouth once daily.)    oxyCODONE-acetaminophen (PERCOCET)  mg per tablet Take 1 tablet by mouth every 4 (four) hours as needed for Pain.    sertraline (ZOLOFT) 100 MG tablet TAKE 1 TABLET BY MOUTH EVERY DAY (Patient taking differently: Take 100 mg by mouth once daily.)    acetaminophen (TYLENOL) 500 MG tablet Take 500 mg by mouth every 8 (eight) hours as needed for Pain. Indications: muscle pain    clobetasol 0.05% (TEMOVATE) 0.05 % Oint AAA on the scalp BID x 4-6 weeks then D/C. Restart PRN for flares.    fluocinolone (DERMA-SMOOTHE) 0.01 % external oil Part the hair at night, apply to flared areas on the scalp QHS PRN for itch.    permethrin (ELIMITE) 5 % cream Apply topically daily as needed.    pimecrolimus (ELIDEL) 1 % cream Apply topically 2 (two) times daily.    simvastatin (ZOCOR) 80 MG tablet TAKE 1 TABLET(80 MG) BY MOUTH EVERY EVENING (Patient taking differently: Take 80 mg by mouth nightly.)     Antibiotics (From admission, onward)      Start     Stop Route Frequency Ordered    12/07/24 0700  ampicillin-sulbactam (UNASYN) 3 g in 0.9% NaCl 100 mL IVPB (MB+)         -- IV Every 12 hours (non-standard times) 12/07/24 0559    12/05/24 1045  mupirocin 2 % ointment         12/10/24 0859 Nasl 2 times daily 12/05/24 0941          Antifungals (From admission, onward)      None          Antivirals (From admission, onward)      None             Immunization History   Administered Date(s) Administered    COVID-19, MRNA, LN-S, PF (MODERNA FULL 0.5 ML DOSE) 02/02/2021, 03/03/2021    Influenza (FLUAD) - Quadrivalent -  Adjuvanted - PF *Preferred* (65+) 10/05/2020, 2022, 10/20/2023    Influenza - Trivalent - Fluzone High Dose - PF (65 years and older) 10/12/2016, 2017, 2018    Pneumococcal Conjugate - 20 Valent 2022       Family History       Problem Relation (Age of Onset)    Cancer Sister          Social History     Socioeconomic History    Marital status:    Tobacco Use    Smoking status: Former     Current packs/day: 0.00     Types: Cigarettes     Quit date: 1999     Years since quittin.9    Smokeless tobacco: Never   Substance and Sexual Activity    Alcohol use: No    Drug use: No    Sexual activity: Never     Social Drivers of Health     Financial Resource Strain: Low Risk  (2024)    Overall Financial Resource Strain (CARDIA)     Difficulty of Paying Living Expenses: Not hard at all   Food Insecurity: No Food Insecurity (2024)    Hunger Vital Sign     Worried About Running Out of Food in the Last Year: Never true     Ran Out of Food in the Last Year: Never true   Transportation Needs: No Transportation Needs (2024)    TRANSPORTATION NEEDS     Transportation : No   Stress: Stress Concern Present (2024)    [employer] - Occupational Stress Questionnaire     Feeling of Stress : Very much   Housing Stability: Low Risk  (2024)    Housing Stability Vital Sign     Unable to Pay for Housing in the Last Year: No     Homeless in the Last Year: No     Review of Systems   Constitutional:  Positive for activity change and appetite change.   Gastrointestinal:  Positive for abdominal pain, constipation, nausea and vomiting.   All other systems reviewed and are negative.    Objective:     Vital Signs (Most Recent):  Temp: 98.2 °F (36.8 °C) (24 1239)  Pulse: 80 (24 1236)  Resp: 18 (24 1455)  BP: (!) 181/76 (24 1236)  SpO2: 95 % (24 1236) Vital Signs (24h Range):  Temp:  [97.4 °F (36.3 °C)-98.2 °F (36.8 °C)] 98.2 °F (36.8  "°C)  Pulse:  [56-80] 80  Resp:  [16-18] 18  SpO2:  [85 %-98 %] 95 %  BP: (132-181)/(63-84) 181/76     Weight: 81.2 kg (179 lb)  Body mass index is 31.71 kg/m².    Estimated Creatinine Clearance: 37.7 mL/min (based on SCr of 1.1 mg/dL).     Physical Exam  Constitutional:       General: She is not in acute distress.     Appearance: Normal appearance. She is not ill-appearing.   Cardiovascular:      Rate and Rhythm: Normal rate and regular rhythm.      Pulses: Normal pulses.      Heart sounds: Normal heart sounds. No murmur heard.     No friction rub. No gallop.   Pulmonary:      Effort: Pulmonary effort is normal. No respiratory distress.      Breath sounds: Normal breath sounds.   Abdominal:      General: Abdomen is flat. Bowel sounds are normal. There is no distension.      Palpations: Abdomen is soft.      Tenderness: There is no abdominal tenderness.   Skin:     General: Skin is warm and dry.   Neurological:      Mental Status: She is alert.          Significant Labs: Blood Culture:   Recent Labs   Lab 12/04/24  2147 12/07/24  0854 12/07/24  0856   LABBLOO Gram stain patricia bottle: Gram positive cocci  Results called to and read back by: UVALDO Gregg. 12/07/2024  05:18  Gram stain aer bottle: Gram positive cocci in chains resembling Strep  12/07/2024  15:12  ENTEROCOCCUS FAECALIS  For susceptibility see order #[id number]  *  Gram stain patricia bottle: Gram positive cocci  Results called to and read back by: UVALDO Gregg. 12/07/2024  05:18  Gram stain aer bottle: Gram positive cocci  ENTEROCOCCUS FAECALIS  Susceptibility pending  * No Growth to date  No Growth to date No Growth to date  No Growth to date     CBC: No results for input(s): "WBC", "HGB", "HCT", "PLT" in the last 48 hours.  CMP: No results for input(s): "NA", "K", "CL", "CO2", "GLU", "BUN", "CREATININE", "CALCIUM", "PROT", "ALBUMIN", "BILITOT", "ALKPHOS", "AST", "ALT", "ANIONGAP", "EGFRNONAA" in the last 48 hours.    Invalid input(s): " ""MARINAA"  Microbiology Results (last 7 days)       Procedure Component Value Units Date/Time    Blood culture [[id number]]  (Abnormal) Collected: 12/04/24 2147    Order Status: Completed Specimen: Blood Updated: 12/09/24 1427     Blood Culture, Routine Gram stain patricia bottle: Gram positive cocci      Results called to and read back by: UVALDO Gregg. 12/07/2024  05:18      Gram stain aer bottle: Gram positive cocci in chains resembling Strep      12/07/2024  15:12      ENTEROCOCCUS FAECALIS  For susceptibility see order #[id number]      Blood culture [[id number]]  (Abnormal) Collected: 12/04/24 2147    Order Status: Completed Specimen: Blood Updated: 12/09/24 1426     Blood Culture, Routine Gram stain patricia bottle: Gram positive cocci      Results called to and read back by: UVALDO Gregg. 12/07/2024  05:18      Gram stain aer bottle: Gram positive cocci      ENTEROCOCCUS FAECALIS  Susceptibility pending      Narrative:      #2    Blood culture [[id number]] Collected: 12/07/24 0854    Order Status: Completed Specimen: Blood from Peripheral, Hand, Left Updated: 12/08/24 1612     Blood Culture, Routine No Growth to date      No Growth to date    Blood culture [[id number]] Collected: 12/07/24 0856    Order Status: Completed Specimen: Blood from Peripheral, Hand, Right Updated: 12/08/24 1612     Blood Culture, Routine No Growth to date      No Growth to date    Rapid Organism ID by PCR (from Blood culture) [[id number]]  (Abnormal) Collected: 12/04/24 2147    Order Status: Completed Updated: 12/07/24 0504     Enterococcus faecalis Detected     Enterococcus faecium Not Detected     Listeria monocytogenes Not Detected     Staphylococcus spp. Not Detected     Staphylococcus aureus Not Detected     Staphylococcus epidermidis Not Detected     Staphylococcus lugdunensis Not Detected     Streptococcus species Not Detected     Streptococcus agalactiae Not Detected     Streptococcus pneumoniae Not Detected     Streptococcus pyogenes " Not Detected     Acinetobacter calcoaceticus/baumannii complex Not Detected     Bacteroides fragilis Not Detected     Enterobacterales Not Detected     Enterobacter cloacae complex Not Detected     Escherichia coli Not Detected     Klebsiella aerogenes Not Detected     Klebsiella oxytoca Not Detected     Klebsiella pneumoniae group Not Detected     Proteus Not Detected     Salmonella sp Not Detected     Serratia marcescens Not Detected     Haemophilus influenzae Not Detected     Neisseria meningtidis Not Detected     Pseudomonas aeruginosa Not Detected     Stenotrophomonas maltophilia Not Detected     Candida albicans Not Detected     Candida auris Not Detected     Candida glabrata Not Detected     Candida krusei Not Detected     Candida parapsilosis Not Detected     Candida tropicalis Not Detected     Cryptococcus neoformans/gattii Not Detected     CTX-M (ESBL ) Test Not Applicable     IMP (Carbapenem resistant) Test Not Applicable     KPC resistance gene (Carbapenem resistant) Test Not Applicable     mcr-1  Test Not Applicable     mec A/C  Test Not Applicable     mec A/C and MREJ (MRSA) gene Test Not Applicable     NDM (Carbapenem resistant) Test Not Applicable     OXA-48-like (Carbapenem resistant) Test Not Applicable     van A/B (VRE gene) Not Detected     VIM (Carbapenem resistant) Test Not Applicable    Narrative:      #2    Culture, MRSA [[id number]] Collected: 12/04/24 2029    Order Status: Completed Specimen: MRSA source from Nares, Left Updated: 12/06/24 0855     MRSA Surveillance Screen No MRSA isolated    Respiratory Infection Panel (PCR), Nasopharyngeal [[id number]] Collected: 12/04/24 2031    Order Status: Completed Specimen: Nasopharyngeal Swab Updated: 12/05/24 0014     Respiratory Infection Panel Source NP swab     Adenovirus Not Detected     Coronavirus 229E, Common Cold Virus Not Detected     Coronavirus HKU1, Common Cold Virus Not Detected     Coronavirus NL63, Common Cold Virus Not  Detected     Coronavirus OC43, Common Cold Virus Not Detected     Comment: The Coronavirus strains detected in this test cause the common cold.  These strains are not the COVID-19 (novel Coronavirus)strain   associated with the respiratory disease outbreak.          SARS-CoV2 (COVID-19) Qualitative PCR Not Detected     Human Metapneumovirus Not Detected     Human Rhinovirus/Enterovirus Not Detected     Influenza A (subtypes H1, H1-2009,H3) Not Detected     Influenza B Not Detected     Parainfluenza Virus 1 Not Detected     Parainfluenza Virus 2 Not Detected     Parainfluenza Virus 3 Not Detected     Parainfluenza Virus 4 Not Detected     Respiratory Syncytial Virus Not Detected     Bordetella Parapertussis (ET7491) Not Detected     Bordetella pertussis (ptxP) Not Detected     Chlamydia pneumoniae Not Detected     Mycoplasma pneumoniae Not Detected     Comment: Respiratory Infection Panel testing performed by Multiplex PCR.       Narrative:      Assay not valid for lower respiratory specimens, alternate  testing required.    Culture, Respiratory with Gram Stain [[id number]] Collected: 12/04/24 2029    Order Status: Sent Specimen: Respiratory from Endotracheal Aspirate Updated: 12/04/24 2029          All pertinent labs within the past 24 hours have been reviewed.    Significant Imaging: I have reviewed all pertinent imaging results/findings within the past 24 hours.

## 2024-12-09 NOTE — HPI
This is an 83 yo F notably with metastatic lung cancer involving both lungs, COPD, HTN, AAA w/stent graft, and CAD s/p CABG & PCI w/stent who presented for low back pain. Found to have metastatic lesions of thoracic and lumbar spine. No plans for surgery. Palliative discussion underway. Had stay in ICU due to hypoxia and need for close monitoring. Febrile to 101.7 F on 12/4. Blood cultures growing E faecalis per BCID. On empiric Unasyn after 2 days of Zosyn. CTA c/a/p reported interval development of multiple inferior right hepatic lobe masses measuring up to 3.9 cm. No new opacities in lungs. ID consulted for bacteremia.

## 2024-12-09 NOTE — SUBJECTIVE & OBJECTIVE
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    COPD (chronic obstructive pulmonary disease)     Coronary artery disease     Essential (primary) hypertension 12/15/2005    Heart attack     1999    Mass of lower lobe of right lung 11/1/2016    Simple chronic bronchitis 11/1/2016       Past Surgical History:   Procedure Laterality Date    APPENDECTOMY      BACK SURGERY      CORONARY ANGIOPLASTY WITH STENT PLACEMENT      HYSTERECTOMY      OTHER SURGICAL HISTORY      Abdominal Aneurysm    TONSILLECTOMY         Review of patient's allergies indicates:   Allergen Reactions    Codeine Nausea Only    Adhesive Itching and Blisters     tape  tape    Latex, natural rubber Itching       Medications:  Medications Prior to Admission   Medication Sig    albuterol-ipratropium (DUO-NEB) 2.5 mg-0.5 mg/3 mL nebulizer solution Take 3 mLs by nebulization every 6 (six) hours as needed for Wheezing. Rescue    fluticasone-umeclidin-vilanter (TRELEGY ELLIPTA) 100-62.5-25 mcg DsDv Inhale 1 puff into the lungs every morning.    gabapentin (NEURONTIN) 100 MG capsule Take 1 capsule (100 mg total) by mouth 3 (three) times daily.    losartan (COZAAR) 100 MG tablet TAKE 1 TABLET(100 MG) BY MOUTH EVERY DAY (Patient taking differently: Take 100 mg by mouth once daily.)    oxyCODONE-acetaminophen (PERCOCET)  mg per tablet Take 1 tablet by mouth every 4 (four) hours as needed for Pain.    sertraline (ZOLOFT) 100 MG tablet TAKE 1 TABLET BY MOUTH EVERY DAY (Patient taking differently: Take 100 mg by mouth once daily.)    acetaminophen (TYLENOL) 500 MG tablet Take 500 mg by mouth every 8 (eight) hours as needed for Pain. Indications: muscle pain    clobetasol 0.05% (TEMOVATE) 0.05 % Oint AAA on the scalp BID x 4-6 weeks then D/C. Restart PRN for flares.    fluocinolone  (DERMA-SMOOTHE) 0.01 % external oil Part the hair at night, apply to flared areas on the scalp QHS PRN for itch.    permethrin (ELIMITE) 5 % cream Apply topically daily as needed.    pimecrolimus (ELIDEL) 1 % cream Apply topically 2 (two) times daily.    simvastatin (ZOCOR) 80 MG tablet TAKE 1 TABLET(80 MG) BY MOUTH EVERY EVENING (Patient taking differently: Take 80 mg by mouth nightly.)     Antibiotics (From admission, onward)      Start     Stop Route Frequency Ordered    24 0700  ampicillin-sulbactam (UNASYN) 3 g in 0.9% NaCl 100 mL IVPB (MB+)         -- IV Every 12 hours (non-standard times) 24 0559    24 1045  mupirocin 2 % ointment         12/10/24 0859 Nasl 2 times daily 24 0941          Antifungals (From admission, onward)      None          Antivirals (From admission, onward)      None             Immunization History   Administered Date(s) Administered    COVID-19, MRNA, LN-S, PF (MODERNA FULL 0.5 ML DOSE) 2021, 2021    Influenza (FLUAD) - Quadrivalent - Adjuvanted - PF *Preferred* (65+) 10/05/2020, 2022, 10/20/2023    Influenza - Trivalent - Fluzone High Dose - PF (65 years and older) 10/12/2016, 2017, 2018    Pneumococcal Conjugate - 20 Valent 2022       Family History       Problem Relation (Age of Onset)    Cancer Sister          Social History     Socioeconomic History    Marital status:    Tobacco Use    Smoking status: Former     Current packs/day: 0.00     Types: Cigarettes     Quit date: 1999     Years since quittin.9    Smokeless tobacco: Never   Substance and Sexual Activity    Alcohol use: No    Drug use: No    Sexual activity: Never     Social Drivers of Health     Financial Resource Strain: Low Risk  (2024)    Overall Financial Resource Strain (CARDIA)     Difficulty of Paying Living Expenses: Not hard at all   Food Insecurity: No Food Insecurity (2024)    Hunger Vital Sign     Worried About Running Out of  Food in the Last Year: Never true     Ran Out of Food in the Last Year: Never true   Transportation Needs: No Transportation Needs (12/6/2024)    TRANSPORTATION NEEDS     Transportation : No   Stress: Stress Concern Present (12/6/2024)    [employer] - Occupational Stress Questionnaire     Feeling of Stress : Very much   Housing Stability: Low Risk  (12/6/2024)    Housing Stability Vital Sign     Unable to Pay for Housing in the Last Year: No     Homeless in the Last Year: No     Review of Systems   Constitutional:  Positive for activity change and appetite change.   Gastrointestinal:  Positive for abdominal pain, constipation, nausea and vomiting.   All other systems reviewed and are negative.    Objective:     Vital Signs (Most Recent):  Temp: 98.2 °F (36.8 °C) (12/09/24 1239)  Pulse: 80 (12/09/24 1236)  Resp: 18 (12/09/24 1455)  BP: (!) 181/76 (12/09/24 1236)  SpO2: 95 % (12/09/24 1236) Vital Signs (24h Range):  Temp:  [97.4 °F (36.3 °C)-98.2 °F (36.8 °C)] 98.2 °F (36.8 °C)  Pulse:  [56-80] 80  Resp:  [16-18] 18  SpO2:  [85 %-98 %] 95 %  BP: (132-181)/(63-84) 181/76     Weight: 81.2 kg (179 lb)  Body mass index is 31.71 kg/m².    Estimated Creatinine Clearance: 37.7 mL/min (based on SCr of 1.1 mg/dL).     Physical Exam  Constitutional:       General: She is not in acute distress.     Appearance: Normal appearance. She is not ill-appearing.   Cardiovascular:      Rate and Rhythm: Normal rate and regular rhythm.      Pulses: Normal pulses.      Heart sounds: Normal heart sounds. No murmur heard.     No friction rub. No gallop.   Pulmonary:      Effort: Pulmonary effort is normal. No respiratory distress.      Breath sounds: Normal breath sounds.   Abdominal:      General: Abdomen is flat. Bowel sounds are normal. There is no distension.      Palpations: Abdomen is soft.      Tenderness: There is no abdominal tenderness.   Skin:     General: Skin is warm and dry.   Neurological:       "Mental Status: She is alert.          Significant Labs: Blood Culture:   Recent Labs   Lab 12/04/24 2147 12/07/24  0854 12/07/24  0856   LABBLOO Gram stain patricia bottle: Gram positive cocci  Results called to and read back by: UVALDO Gregg. 12/07/2024  05:18  Gram stain aer bottle: Gram positive cocci in chains resembling Strep  12/07/2024  15:12  ENTEROCOCCUS FAECALIS  For susceptibility see order #[id number]  *  Gram stain patricia bottle: Gram positive cocci  Results called to and read back by: UVALDO Gregg. 12/07/2024  05:18  Gram stain aer bottle: Gram positive cocci  ENTEROCOCCUS FAECALIS  Susceptibility pending  * No Growth to date  No Growth to date No Growth to date  No Growth to date     CBC: No results for input(s): "WBC", "HGB", "HCT", "PLT" in the last 48 hours.  CMP: No results for input(s): "NA", "K", "CL", "CO2", "GLU", "BUN", "CREATININE", "CALCIUM", "PROT", "ALBUMIN", "BILITOT", "ALKPHOS", "AST", "ALT", "ANIONGAP", "EGFRNONAA" in the last 48 hours.    Invalid input(s): "ESTGFAFRICA"  Microbiology Results (last 7 days)       Procedure Component Value Units Date/Time    Blood culture [[id number]]  (Abnormal) Collected: 12/04/24 2147    Order Status: Completed Specimen: Blood Updated: 12/09/24 1427     Blood Culture, Routine Gram stain patricia bottle: Gram positive cocci      Results called to and read back by: UVALDO Gregg. 12/07/2024  05:18      Gram stain aer bottle: Gram positive cocci in chains resembling Strep      12/07/2024  15:12      ENTEROCOCCUS FAECALIS  For susceptibility see order #[id number]      Blood culture [[id number]]  (Abnormal) Collected: 12/04/24 2147    Order Status: Completed Specimen: Blood Updated: 12/09/24 1426     Blood Culture, Routine Gram stain patricia bottle: Gram positive cocci      Results called to and read back by: UVALDO Gregg. 12/07/2024  05:18      Gram stain aer bottle: Gram positive cocci      ENTEROCOCCUS FAECALIS  Susceptibility pending      Narrative:      #2    Blood " culture [[id number]] Collected: 12/07/24 0854    Order Status: Completed Specimen: Blood from Peripheral, Hand, Left Updated: 12/08/24 1612     Blood Culture, Routine No Growth to date      No Growth to date    Blood culture [[id number]] Collected: 12/07/24 0856    Order Status: Completed Specimen: Blood from Peripheral, Hand, Right Updated: 12/08/24 1612     Blood Culture, Routine No Growth to date      No Growth to date    Rapid Organism ID by PCR (from Blood culture) [[id number]]  (Abnormal) Collected: 12/04/24 2147    Order Status: Completed Updated: 12/07/24 0504     Enterococcus faecalis Detected     Enterococcus faecium Not Detected     Listeria monocytogenes Not Detected     Staphylococcus spp. Not Detected     Staphylococcus aureus Not Detected     Staphylococcus epidermidis Not Detected     Staphylococcus lugdunensis Not Detected     Streptococcus species Not Detected     Streptococcus agalactiae Not Detected     Streptococcus pneumoniae Not Detected     Streptococcus pyogenes Not Detected     Acinetobacter calcoaceticus/baumannii complex Not Detected     Bacteroides fragilis Not Detected     Enterobacterales Not Detected     Enterobacter cloacae complex Not Detected     Escherichia coli Not Detected     Klebsiella aerogenes Not Detected     Klebsiella oxytoca Not Detected     Klebsiella pneumoniae group Not Detected     Proteus Not Detected     Salmonella sp Not Detected     Serratia marcescens Not Detected     Haemophilus influenzae Not Detected     Neisseria meningtidis Not Detected     Pseudomonas aeruginosa Not Detected     Stenotrophomonas maltophilia Not Detected     Candida albicans Not Detected     Candida auris Not Detected     Candida glabrata Not Detected     Candida krusei Not Detected     Candida parapsilosis Not Detected     Candida tropicalis Not Detected     Cryptococcus neoformans/gattii Not Detected     CTX-M (ESBL ) Test Not Applicable     IMP (Carbapenem resistant) Test Not  Applicable     KPC resistance gene (Carbapenem resistant) Test Not Applicable     mcr-1  Test Not Applicable     mec A/C  Test Not Applicable     mec A/C and MREJ (MRSA) gene Test Not Applicable     NDM (Carbapenem resistant) Test Not Applicable     OXA-48-like (Carbapenem resistant) Test Not Applicable     van A/B (VRE gene) Not Detected     VIM (Carbapenem resistant) Test Not Applicable    Narrative:      #2    Culture, MRSA [[id number]] Collected: 12/04/24 2029    Order Status: Completed Specimen: MRSA source from Nares, Left Updated: 12/06/24 0855     MRSA Surveillance Screen No MRSA isolated    Respiratory Infection Panel (PCR), Nasopharyngeal [[id number]] Collected: 12/04/24 2031    Order Status: Completed Specimen: Nasopharyngeal Swab Updated: 12/05/24 0014     Respiratory Infection Panel Source NP swab     Adenovirus Not Detected     Coronavirus 229E, Common Cold Virus Not Detected     Coronavirus HKU1, Common Cold Virus Not Detected     Coronavirus NL63, Common Cold Virus Not Detected     Coronavirus OC43, Common Cold Virus Not Detected     Comment: The Coronavirus strains detected in this test cause the common cold.  These strains are not the COVID-19 (novel Coronavirus)strain   associated with the respiratory disease outbreak.          SARS-CoV2 (COVID-19) Qualitative PCR Not Detected     Human Metapneumovirus Not Detected     Human Rhinovirus/Enterovirus Not Detected     Influenza A (subtypes H1, H1-2009,H3) Not Detected     Influenza B Not Detected     Parainfluenza Virus 1 Not Detected     Parainfluenza Virus 2 Not Detected     Parainfluenza Virus 3 Not Detected     Parainfluenza Virus 4 Not Detected     Respiratory Syncytial Virus Not Detected     Bordetella Parapertussis (OW5147) Not Detected     Bordetella pertussis (ptxP) Not Detected     Chlamydia pneumoniae Not Detected     Mycoplasma pneumoniae Not Detected     Comment: Respiratory Infection Panel testing performed by Multiplex PCR.        Narrative:      Assay not valid for lower respiratory specimens, alternate  testing required.    Culture, Respiratory with Gram Stain [[id number]] Collected: 12/04/24 2029    Order Status: Sent Specimen: Respiratory from Endotracheal Aspirate Updated: 12/04/24 2029          All pertinent labs within the past 24 hours have been reviewed.    Significant Imaging: I have reviewed all pertinent imaging results/findings within the past 24 hours.

## 2024-12-09 NOTE — ASSESSMENT & PLAN NOTE
--All cases of Enterococcal bacteremia require source control and workup for metastatic infection; pose risk of life threatening sepsis  --E faecalis commonly S to ampicillin  --Follow initial blood cx for susceptibilities  --Continue IV amp/sulbactam while inpatient   --Recommend discussing CTA with IR to ensure masses not indicative of abscess that may need aspiration; possible source of bacteremia   --TTE does not report vegetations   --Follow repeat blood cx for clearance; NGTD    --Palliative care following; plans to discharge on hospice  --Not OPAT candidate   --Recommend 14 day course of PO amoxicillin 1 g TID from negative blood cx; stop date 12/20/24  --Will schedule virtual follow up

## 2024-12-09 NOTE — PROGRESS NOTES
[facility]  [facility]ology[facility]On[facility]  Progress Note    Patient Name: Katrina Duran  Admission Date: 12/3/2024  Hospital Length of Stay: 5 days  Code Status: DNR     Subjective:     HPI:    HPI:Katrina Duran is a 84 y.o. female with PMHx of HTN, COPD, Lung Ca and CAD s/p CABG in 1999 and stent in 2006, brought to ER by EMS for severe lower back pain that radiates to her right leg since July of this year. Pt reports she is unable to stand and ambulate, like her legs are giving out upon standing and experiences excruciating pain when getting out of bed     Pt has hx lung cancer and received radiation in November of 2023.   CTA CAP 12/1/2204: Interval increase in angular opacity within the lingula, likely a combination of partial collapse with possible underlying mass. Interval increase in size of semi-solid right lower lobe nodule with increase central solid component, currently measuring 4.2 cm. Interval development of a 7 mm spiculated nodule in the right lung apex. Interval development of at least 2 hepatic masses measuring up to 3.9 cm. These changes must be considered neoplastic in origin until proven otherwise. A repeat PET CT scan would be helpful.      CT L/S on 12/1-  subtle nondisplaced fracture involving the inferior, posterior and left lateral corner of the L3 vertebral body. Marked degenerative disc changes and degenerative facet arthropathy noted diffusely with marked convex left curvature of the lumbar spine.  Multilevel, multifactorial moderate to severe spinal canal stenosis and nerve root foramen narrowing, most significantly involving the L3/L4 disc level.      MRI lumbar spine: 12/6/2024: Metastasis involving the L3, L4, L5 and S1 vertebra.  There also may be a metastasis involving the left iliac bone at the SI joint.     MRI cervical spine: No suspicious bone lesions suggest bone metastasis.     MRI thoracic spine 12/6/2024: Abnormal bone lesions involving T 10, T11 and T12 concerning for bone  metastasis.               Medications:  Continuous Infusions:       Interval History:  patient is very weak able to talk to daughter at bedside    Oncology Treatment Plan:   [No matching plan found]    Medications:  Continuous Infusions:  Scheduled Meds:   albuterol-ipratropium  3 mL Nebulization Q6H WAKE    ampicillin-sulbactam  3 g Intravenous Q12H    arformoteroL  15 mcg Nebulization BID    atorvastatin  40 mg Oral Daily    budesonide  0.5 mg Nebulization Q12H    gabapentin  100 mg Oral TID    heparin (porcine)  5,000 Units Subcutaneous Q8H    LIDOcaine  1 patch Transdermal Q24H    morphine  2 mg Intravenous Once    mupirocin   Nasal BID    pantoprazole  40 mg Oral Daily    sertraline  100 mg Oral QHS    sodium chloride 0.9%  10 mL Intravenous Q8H     PRN Meds:  Current Facility-Administered Medications:     acetaminophen, 650 mg, Oral, Q4H PRN    dextrose 10%, 12.5 g, Intravenous, PRN    dextrose 10%, 25 g, Intravenous, PRN    HYDROmorphone, 1 mg, Intravenous, Q4H PRN    influenza (adjuvanted), 0.5 mL, Intramuscular, Prior to discharge    melatonin, 6 mg, Oral, Nightly PRN    naloxone, 0.02 mg, Intravenous, PRN    ondansetron, 8 mg, Oral, Q8H PRN    oxyCODONE-acetaminophen, 1 tablet, Oral, Q4H PRN    polyethylene glycol, 17 g, Oral, Daily PRN    promethazine, 25 mg, Oral, Q6H PRN    senna-docusate 8.6-50 mg, 1 tablet, Oral, BID PRN     Review of Systems   Constitutional:  Positive for activity change, appetite change and fatigue. Negative for chills, diaphoresis, fever and unexpected weight change.   HENT:  Negative for congestion, dental problem, drooling, ear discharge, ear pain, facial swelling, hearing loss, mouth sores, nosebleeds, postnasal drip, rhinorrhea, sinus pressure, sneezing, sore throat, tinnitus, trouble swallowing and voice change.    Eyes:  Negative for photophobia, pain, discharge, redness, itching and visual disturbance.   Respiratory:  Negative for cough, choking, chest tightness, shortness  of breath, wheezing and stridor.    Cardiovascular:  Negative for chest pain, palpitations and leg swelling.   Gastrointestinal:  Negative for abdominal distention, abdominal pain, anal bleeding, blood in stool, constipation, diarrhea, nausea, rectal pain and vomiting.   Endocrine: Negative for cold intolerance, heat intolerance, polydipsia, polyphagia and polyuria.   Genitourinary:  Negative for decreased urine volume, difficulty urinating, dyspareunia, dysuria, enuresis, flank pain, frequency, genital sores, hematuria, menstrual problem, pelvic pain, urgency, vaginal bleeding, vaginal discharge and vaginal pain.   Musculoskeletal:  Positive for arthralgias and back pain. Negative for gait problem, joint swelling, myalgias, neck pain and neck stiffness.   Skin:  Negative for color change, pallor and rash.   Allergic/Immunologic: Negative for environmental allergies, food allergies and immunocompromised state.   Neurological:  Positive for weakness. Negative for dizziness, tremors, seizures, syncope, facial asymmetry, speech difficulty, light-headedness, numbness and headaches.   Hematological:  Negative for adenopathy. Does not bruise/bleed easily.   Psychiatric/Behavioral:  Positive for dysphoric mood. Negative for agitation, behavioral problems, confusion, decreased concentration, hallucinations, self-injury, sleep disturbance and suicidal ideas. The patient is nervous/anxious. The patient is not hyperactive.      Objective:     Vital Signs (Most Recent):  Temp: 97.4 °F (36.3 °C) (12/09/24 0456)  Pulse: 64 (12/09/24 0527)  Resp: 18 (12/09/24 0526)  BP: (!) 146/84 (12/09/24 0456)  SpO2: 98 % (12/09/24 0456) Vital Signs (24h Range):  Temp:  [97.4 °F (36.3 °C)-97.9 °F (36.6 °C)] 97.4 °F (36.3 °C)  Pulse:  [56-88] 64  Resp:  [15-20] 18  SpO2:  [85 %-98 %] 98 %  BP: (132-167)/(63-84) 146/84     Weight: 81.2 kg (179 lb)  Body mass index is 31.71 kg/m².  Body surface area is 1.9 meters squared.      Intake/Output  Summary (Last 24 hours) at 12/9/2024 0739  Last data filed at 12/9/2024 0527  Gross per 24 hour   Intake 500 ml   Output 1930 ml   Net -1430 ml        Physical Exam  Vitals reviewed.   Constitutional:       General: She is not in acute distress.     Appearance: She is well-developed. She is cachectic. She is ill-appearing. She is not diaphoretic.   HENT:      Head: Normocephalic and atraumatic.      Right Ear: External ear normal.      Left Ear: External ear normal.      Nose: Nose normal.      Right Sinus: No maxillary sinus tenderness or frontal sinus tenderness.      Left Sinus: No maxillary sinus tenderness or frontal sinus tenderness.      Mouth/Throat:      Pharynx: No oropharyngeal exudate.   Eyes:      General: Lids are normal. No scleral icterus.        Right eye: No discharge.         Left eye: No discharge.      Conjunctiva/sclera: Conjunctivae normal.      Right eye: Right conjunctiva is not injected. No hemorrhage.     Left eye: Left conjunctiva is not injected. No hemorrhage.     Pupils: Pupils are equal, round, and reactive to light.   Neck:      Thyroid: No thyromegaly.      Vascular: No JVD.      Trachea: No tracheal deviation.   Cardiovascular:      Rate and Rhythm: Normal rate.   Pulmonary:      Effort: Pulmonary effort is normal. No respiratory distress.      Breath sounds: Normal breath sounds. No stridor.   Chest:      Chest wall: No tenderness.   Abdominal:      General: Bowel sounds are normal. There is no distension.      Palpations: Abdomen is soft. There is no hepatomegaly, splenomegaly or mass.      Tenderness: There is no abdominal tenderness. There is no rebound.   Musculoskeletal:         General: No tenderness. Normal range of motion.      Cervical back: Normal range of motion and neck supple.   Lymphadenopathy:      Cervical: No cervical adenopathy.      Upper Body:      Right upper body: No supraclavicular adenopathy.      Left upper body: No supraclavicular adenopathy.   Skin:      "General: Skin is dry.      Findings: No erythema or rash.   Neurological:      Mental Status: She is alert and oriented to person, place, and time.      Cranial Nerves: No cranial nerve deficit.      Motor: Weakness present.      Coordination: Coordination abnormal.      Gait: Gait abnormal.   Psychiatric:         Behavior: Behavior normal.         Thought Content: Thought content normal.         Judgment: Judgment normal.          Significant Labs:   BMP: No results for input(s): "GLU", "NA", "K", "CL", "CO2", "BUN", "CREATININE", "CALCIUM", "MG" in the last 48 hours.    Diagnostic Results:  I have reviewed all pertinent imaging results/findings within the past 24 hours.  Assessment/Plan:     Metastasis to bone   Will defer to Radiation therapy for painful bone metastasis for relief of discomfort and pain    Advanced care planning/counseling discussion   Agree entirely with do not resuscitate order and referral to hospice once palliative radiation is completed    Adenocarcinoma of left lung   Widespread metastatic non-small cell lung carcinoma.  Patient has disease in lung liver.  She does not wish to take systemic therapy but does wish palliative radiation therapy to areas that are painful and bone.  I had very long discussion with her and her daughter at bedside strongly recommend hospice care she lives in the [facility] and will defer to  which hospice is have inpatient units associated with them [facility]        Thank you for your consult. I will follow-up with patient. Please contact us if you have any additional questions.     Farshad Douglass MD  Hematology/Oncology  [facility]      "

## 2024-12-09 NOTE — SUBJECTIVE & OBJECTIVE
"    Review of Systems      Objective:     Vital Signs (Most Recent):  Temp: 98.2 °F (36.8 °C) (12/09/24 1239)  Pulse: 80 (12/09/24 1236)  Resp: 18 (12/09/24 1455)  BP: (!) 181/76 (12/09/24 1236)  SpO2: 95 % (12/09/24 1236) Vital Signs (24h Range):  Temp:  [97.4 °F (36.3 °C)-98.2 °F (36.8 °C)] 98.2 °F (36.8 °C)  Pulse:  [56-80] 80  Resp:  [16-18] 18  SpO2:  [85 %-98 %] 95 %  BP: (132-181)/(63-84) 181/76     Weight: 81.2 kg (179 lb)  Body mass index is 31.71 kg/m².    Intake/Output Summary (Last 24 hours) at 12/9/2024 1459  Last data filed at 12/9/2024 0527  Gross per 24 hour   Intake 320 ml   Output 1450 ml   Net -1130 ml         Physical Exam      Constitutional:       General: She is not in acute distress.     Appearance: Normal appearance. She is normal weight. She is ill-appearing.   Cardiovascular:      Rate and Rhythm: Normal rate and regular rhythm.      Heart sounds: No murmur heard.  Pulmonary:      Effort: Pulmonary effort is normal. No respiratory distress.      Breath sounds: No wheezing.      Comments: Wearing 2 L NC  Genitourinary:     Comments: Shelton in place  Neurological:      Mental Status: She is alert  Significant Labs: All pertinent labs within the past 24 hours have been reviewed.  CBC: No results for input(s): "WBC", "HGB", "HCT", "PLT" in the last 48 hours.  CMP: No results for input(s): "NA", "K", "CL", "CO2", "GLU", "BUN", "CREATININE", "CALCIUM", "PROT", "ALBUMIN", "BILITOT", "ALKPHOS", "AST", "ALT", "ANIONGAP", "EGFRNONAA" in the last 48 hours.    Invalid input(s): "ESTGFAFRICA"    Significant Imaging:     Imaging Results              X-Ray Chest AP Portable (Final result)  Result time 12/03/24 13:08:01      Final result by Cam Obrien MD (12/03/24 13:08:01)                   Impression:      Worsening left-sided airspace opacity in comparison to the prior radiograph.  Underlying mass not excluded.      Electronically signed by: Cam Obrien  Date:    12/03/2024  Time:    13:08    "            Narrative:    EXAMINATION:  XR CHEST AP PORTABLE    CLINICAL HISTORY:  Low back pain, unspecified    TECHNIQUE:  Single frontal view of the chest was performed.    COMPARISON:  Multiple    FINDINGS:  Suspect worsening left-sided pulmonary opacity in comparison to the prior radiograph.  Underlying mass not excluded.  No pleural fluid or pneumothorax.    The cardiac silhouette is enlarged.  The hilar and mediastinal contours are unremarkable.    Bones are intact.

## 2024-12-09 NOTE — ASSESSMENT & PLAN NOTE
Hydromorphone, oxycodone PRN  Will add daily dexamethasone   Radiation today  Plans to admit to hospice upon discharge.

## 2024-12-09 NOTE — ASSESSMENT & PLAN NOTE
Widespread metastatic non-small cell lung carcinoma.  Patient has disease in lung liver.  She does not wish to take systemic therapy but does wish palliative radiation therapy to areas that are painful and bone.  I had very long discussion with her and her daughter at bedside strongly recommend hospice care she lives in the [facility] and will defer to  which hospice is have inpatient units associated with them [facility]

## 2024-12-09 NOTE — PLAN OF CARE
Call to [facility] in [place] to discuss referral.  CM informed by staff that they do not provide hospice care at home.  Preferred provider for that facility is [facility].  Call to patient's daughter, emmanuel Ortega to use [facility].   Referral sent to [facility].     1420 Confirmed with Shannon that [facility] had spoken to her.  Reports that patient is having significant pain since returning from radiation treatment. Requests patient be allowed to remain overnight to address pain.  MD updated.

## 2024-12-09 NOTE — HPI
HPI:Katrina Duran is a 84 y.o. female with PMHx of HTN, COPD, Lung Ca and CAD s/p CABG in 1999 and stent in 2006, brought to ER by EMS for severe lower back pain that radiates to her right leg since July of this year. Pt reports she is unable to stand and ambulate, like her legs are giving out upon standing and experiences excruciating pain when getting out of bed     Pt has hx lung cancer and received radiation in November of 2023.   CTA CAP 12/1/2204: Interval increase in angular opacity within the lingula, likely a combination of partial collapse with possible underlying mass. Interval increase in size of semi-solid right lower lobe nodule with increase central solid component, currently measuring 4.2 cm. Interval development of a 7 mm spiculated nodule in the right lung apex. Interval development of at least 2 hepatic masses measuring up to 3.9 cm. These changes must be considered neoplastic in origin until proven otherwise. A repeat PET CT scan would be helpful.      CT L/S on 12/1-  subtle nondisplaced fracture involving the inferior, posterior and left lateral corner of the L3 vertebral body. Marked degenerative disc changes and degenerative facet arthropathy noted diffusely with marked convex left curvature of the lumbar spine.  Multilevel, multifactorial moderate to severe spinal canal stenosis and nerve root foramen narrowing, most significantly involving the L3/L4 disc level.      MRI lumbar spine: 12/6/2024: Metastasis involving the L3, L4, L5 and S1 vertebra.  There also may be a metastasis involving the left iliac bone at the SI joint.     MRI cervical spine: No suspicious bone lesions suggest bone metastasis.     MRI thoracic spine 12/6/2024: Abnormal bone lesions involving T 10, T11 and T12 concerning for bone metastasis.               Medications:  Continuous Infusions:

## 2024-12-10 VITALS
WEIGHT: 179 LBS | OXYGEN SATURATION: 97 % | BODY MASS INDEX: 31.71 KG/M2 | RESPIRATION RATE: 18 BRPM | SYSTOLIC BLOOD PRESSURE: 173 MMHG | DIASTOLIC BLOOD PRESSURE: 76 MMHG | HEART RATE: 71 BPM | HEIGHT: 63 IN | TEMPERATURE: 98 F

## 2024-12-10 PROBLEM — E43 SEVERE PROTEIN-CALORIE MALNUTRITION: Status: ACTIVE | Noted: 2024-12-10

## 2024-12-10 LAB
BACTERIA BLD CULT: ABNORMAL

## 2024-12-10 PROCEDURE — 99900035 HC TECH TIME PER 15 MIN (STAT)

## 2024-12-10 PROCEDURE — 25000003 PHARM REV CODE 250: Performed by: INTERNAL MEDICINE

## 2024-12-10 PROCEDURE — 25000003 PHARM REV CODE 250: Performed by: EMERGENCY MEDICINE

## 2024-12-10 PROCEDURE — 63600175 PHARM REV CODE 636 W HCPCS: Performed by: INTERNAL MEDICINE

## 2024-12-10 PROCEDURE — 63600175 PHARM REV CODE 636 W HCPCS: Performed by: EMERGENCY MEDICINE

## 2024-12-10 PROCEDURE — 63600175 PHARM REV CODE 636 W HCPCS: Performed by: NURSE PRACTITIONER

## 2024-12-10 PROCEDURE — 94640 AIRWAY INHALATION TREATMENT: CPT

## 2024-12-10 PROCEDURE — 25000003 PHARM REV CODE 250: Performed by: STUDENT IN AN ORGANIZED HEALTH CARE EDUCATION/TRAINING PROGRAM

## 2024-12-10 PROCEDURE — 99232 SBSQ HOSP IP/OBS MODERATE 35: CPT | Mod: ,,, | Performed by: NURSE PRACTITIONER

## 2024-12-10 PROCEDURE — A4216 STERILE WATER/SALINE, 10 ML: HCPCS | Performed by: EMERGENCY MEDICINE

## 2024-12-10 PROCEDURE — 25000003 PHARM REV CODE 250

## 2024-12-10 PROCEDURE — 27000221 HC OXYGEN, UP TO 24 HOURS

## 2024-12-10 PROCEDURE — 25000242 PHARM REV CODE 250 ALT 637 W/ HCPCS

## 2024-12-10 PROCEDURE — 99232 SBSQ HOSP IP/OBS MODERATE 35: CPT | Mod: ,,, | Performed by: INTERNAL MEDICINE

## 2024-12-10 PROCEDURE — 94761 N-INVAS EAR/PLS OXIMETRY MLT: CPT

## 2024-12-10 RX ORDER — AMOXICILLIN 500 MG/1
1000 TABLET, FILM COATED ORAL 3 TIMES DAILY
Qty: 60 TABLET | Refills: 0 | Status: SHIPPED | OUTPATIENT
Start: 2024-12-10 | End: 2024-12-20

## 2024-12-10 RX ADMIN — Medication 10 ML: at 06:12

## 2024-12-10 RX ADMIN — GABAPENTIN 100 MG: 100 CAPSULE ORAL at 08:12

## 2024-12-10 RX ADMIN — GABAPENTIN 100 MG: 100 CAPSULE ORAL at 01:12

## 2024-12-10 RX ADMIN — GABAPENTIN 100 MG: 100 CAPSULE ORAL at 02:12

## 2024-12-10 RX ADMIN — DEXAMETHASONE SODIUM PHOSPHATE 4 MG: 4 INJECTION INTRA-ARTICULAR; INTRALESIONAL; INTRAMUSCULAR; INTRAVENOUS; SOFT TISSUE at 08:12

## 2024-12-10 RX ADMIN — OXYCODONE AND ACETAMINOPHEN 1 TABLET: 7.5; 325 TABLET ORAL at 01:12

## 2024-12-10 RX ADMIN — HEPARIN SODIUM 5000 UNITS: 5000 INJECTION INTRAVENOUS; SUBCUTANEOUS at 06:12

## 2024-12-10 RX ADMIN — HEPARIN SODIUM 5000 UNITS: 5000 INJECTION INTRAVENOUS; SUBCUTANEOUS at 02:12

## 2024-12-10 RX ADMIN — ATORVASTATIN CALCIUM 40 MG: 40 TABLET, FILM COATED ORAL at 08:12

## 2024-12-10 RX ADMIN — PANTOPRAZOLE SODIUM 40 MG: 40 TABLET, DELAYED RELEASE ORAL at 08:12

## 2024-12-10 RX ADMIN — OXYCODONE AND ACETAMINOPHEN 1 TABLET: 7.5; 325 TABLET ORAL at 06:12

## 2024-12-10 RX ADMIN — Medication 10 ML: at 02:12

## 2024-12-10 RX ADMIN — SENNOSIDES AND DOCUSATE SODIUM 1 TABLET: 50; 8.6 TABLET ORAL at 08:12

## 2024-12-10 RX ADMIN — IPRATROPIUM BROMIDE AND ALBUTEROL SULFATE 3 ML: 2.5; .5 SOLUTION RESPIRATORY (INHALATION) at 02:12

## 2024-12-10 RX ADMIN — AMPICILLIN SODIUM AND SULBACTAM SODIUM 3 G: 2; 1 INJECTION, POWDER, FOR SOLUTION INTRAMUSCULAR; INTRAVENOUS at 06:12

## 2024-12-10 RX ADMIN — POLYETHYLENE GLYCOL 3350 17 G: 17 POWDER, FOR SOLUTION ORAL at 08:12

## 2024-12-10 RX ADMIN — OXYCODONE AND ACETAMINOPHEN 1 TABLET: 7.5; 325 TABLET ORAL at 04:12

## 2024-12-10 RX ADMIN — SERTRALINE HYDROCHLORIDE 100 MG: 50 TABLET ORAL at 01:12

## 2024-12-10 NOTE — PROGRESS NOTES
[facility] - Med Surg  Adult Nutrition  Progress Note    SUMMARY       Recommendations    Recommendation/Intervention:   1. Recommend modify pt's diet to a Regular diet, texture per SLP recommendations   2. Recommend pt continues Suplena TID to assist filling nutritional gaps   3. Encourage PO and supplement intake, recommend feeding assistance   4. Recommend anti nasuea medication as warranted   5. Recommend continue bowel regimen (no BM x 10 days) as warranted   6. Weigh twice weekly    Goals:   1. Pt will tolerate and consume >75% EEN and EPN prior to RD follow up   2. Pt's nausea will be resolved prior to RD follow up (Resolved)  3. Pt will have a BM prior to RD follow up  Nutrition Goal Status: continues, resolved   Communication of RD Recs: other (comment) (POC, sticky note)    Assessment and Plan    Endocrine  Severe protein-calorie malnutrition  Malnutrition Type:  Context: acute on chronic illness  Level: severe    Related to (etiology):   Physiological causes increasing nutrient needs d/t illness    Signs and Symptoms (as evidenced by):   Inc loss of subcutaneous fat  Inc muscle loss  Change in functional indicators ( strength)  Unable or unwilling to eat sufficient energy/protein to maintain a healthy weight  Food avoidance and/or lack of interest in food  Pneumonia    Malnutrition Characteristic Summary:  Energy Intake (Malnutrition): less than or equal to 50% for greater than or equal to 5 days  Subcutaneous Fat (Malnutrition): moderate depletion  Muscle Mass (Malnutrition): moderate depletion  Fluid Accumulation (Malnutrition):  (1+ trace)  Hand  Strength, Left (Malnutrition): decreased  Hand  Strength, Right (Malnutrition): decreased    Interventions/Recommendations (treatment strategy):  1. General healthful diet  2. Commercial beverage medical food supplement therapy  3. Feeding assistance management   4. Collaboration by nutrition professional with other providers    Nutrition Diagnosis  Status:   New         Malnutrition Assessment (12/10/24):  Malnutrition Context: acute illness or injury, chronic illness  Malnutrition Level: severe  Skin (Micronutrient): bruised, edema (Mando score = 13 (moderate risk)       Energy Intake (Malnutrition): less than or equal to 50% for greater than or equal to 5 days  Subcutaneous Fat (Malnutrition): moderate depletion  Muscle Mass (Malnutrition): moderate depletion  Fluid Accumulation (Malnutrition):  (1+ trace)  Hand  Strength, Left (Malnutrition): decreased  Hand  Strength, Right (Malnutrition): decreased   Orbital Region (Subcutaneous Fat Loss): mild depletion  Upper Arm Region (Subcutaneous Fat Loss): moderate depletion   Cheondoism Region (Muscle Loss): mild depletion  Clavicle Bone Region (Muscle Loss): moderate depletion  Scapular Bone Region (Muscle Loss): moderate depletion  Dorsal Hand (Muscle Loss): moderate depletion  Patellar Region (Muscle Loss): moderate depletion  Anterior Thigh Region (Muscle Loss): moderate depletion  Posterior Calf Region (Muscle Loss): moderate depletion                 Reason for Assessment    Reason For Assessment: consult (MD order)  Diagnosis:  (Intractable low back pain)  General Information Comments:   12/4/24: 84 y.o. Female admitted for Intractable low back pain. PMH: UTI, Metastic lung cancer, NICKI; Hx: CHF, HLD, COPD, Heart attack. Pt currently on a Regular diet. RD consulted per MD order. Visited pt at bedside, pt was sleeping, pt's family members present. Pt family member reported pt's appetite was decreased to 0-25% x 5 days PTA, stated she ate some dinner last noght but no PO intake today, reported pt has slight nausea and constipation. RD provided a menu for the pt to encourage pt preferred food choices, dicussed protein/calorie benfits of Suplena, pt family member stated pt drinks ONS's at home and believes will be receptive to try, RD added to pt's orders and trays. Pt family member denied pt experiencing  "any N/D, abd pain or chewing/swallowing difficulties. Pt family member reported pt's UBW was 280 lbs but unsure how long ago, stated she has noticed pt weight loss in the last month. Unable to perform NFPE at this time d/t pt sleeping, will perform at follow up. LBM 11/30 (x 4 days no BM). Labs, meds, weights reviewed. Weights charted 10/16/24 180 lbs, 12/4/24 169 lbs (BMI 30.07, Obese), -11 lb wt loss (6% wt change) x 2 months. RD will continue to follow and monitor pt's nutritional status during admit.    Follow up:   12/10/24: RD follow up. Pt currently on a Cardiac, Soft and bite-sized (IDDSI Level 6) diet. EMR noted pt stated pain/discomfort improved, no BM continues, pt received an enema today, plan to d/c to [facility]. SLP noted 12/6/24 pt recommended for Soft and bite sized diet. Visited pt at bedside, pt resting in bed, pt's daughter present. Pt's daughter reported poor PO intake continues, stated pt did have some N/V after radiation yesterday (12/9), but she did eat a sundae brought in by family and tolerated. Pt confirmed currently no N/V/D, stated "just no appetite", possibly d/t constipation, pt's daughter stated enema was given over an hr prior to visit, pt confirmed no bowel sounds or urgency for BM. Discussed protein/calorie benefits of Suplena, pt receptive to try, provided a carton, pt consumed some and tolerated, RD added to pt's orders. Pt stated her UBW was 218 lbs in June 2024. NFPE performed, mild/moderate malnutrition noted. LBM 11/30 (x 10 days no BM). Labs, meds, weights reviewed. Weight charted 6/17/24 184 lbs, 12/7/24 179 lbs (BMI 31.71, Obese), -5 lb wt loss (3% wt change) x 6 months, insignificant wt loss, previous weights stable, note weight charted 12/4/24 169 lbs, -10 lb wt loss x 3 days, likely weight incorrect, re weigh for accuracy warranted. RD will continue to follow and monitor pt's nutritional status during admit.     Nutrition Discharge Planning: General " "healthful diet + Suplena as warranted    Nutrition Risk Screen    Nutrition Risk Screen: difficulty chewing/swallowing, reduced oral intake over the last month    Nutrition Related Social Determinants of Health: SDOH: Adequate food in home environment and None Identified    Nutrition/Diet History    Spiritual, Cultural Beliefs, [facility] Practices, Values that Affect Care: no  Supplemental Drinks or Food Habits: Boost Original  Food Allergies: other (see comments) (Latex)  Factors Affecting Nutritional Intake: decreased appetite, nausea/vomiting, constipation    Anthropometrics    Temp: 97.6 °F (36.4 °C)  Height: 5' 3" (160 cm)  Height (inches): 63 in  Weight Method: Bed Scale  Weight: 81.2 kg (179 lb)  Weight (lb): 179 lb  Ideal Body Weight (IBW), Female: 115 lb  % Ideal Body Weight, Female (lb): 155.65 %  BMI (Calculated): 31.7  BMI Grade: 30 - 34.9- obesity - grade I  Weight Loss: unintentional  Usual Body Weight (UBW), k.81 kg  Weight Change Amount: 11 lb  % Usual Body Weight: 94.32  % Weight Change From Usual Weight: -5.88 %       Wt Readings from Last 15 Encounters:   24 81.2 kg (179 lb)   24 77 kg (169 lb 11.2 oz)   10/16/24 81.8 kg (180 lb 5.4 oz)   24 81.8 kg (180 lb 6.4 oz)   24 81.8 kg (180 lb 6.4 oz)   24 83 kg (183 lb)   24 83.1 kg (183 lb 1.6 oz)   24 80.8 kg (178 lb 2.1 oz)   24 83.5 kg (184 lb 1.4 oz)   24 83.5 kg (184 lb 1.4 oz)   04/15/24 83.5 kg (184 lb)   24 83.5 kg (184 lb)   24 83.5 kg (184 lb)   24 83.6 kg (184 lb 3.2 oz)   10/23/23 85.7 kg (189 lb)     Lab/Procedures/Meds    Pertinent Labs Reviewed: reviewed  Pertinent Medications Reviewed: reviewed    BMP  Lab Results   Component Value Date     (L) 2024    K 4.5 2024     2024    CO2 20 (L) 2024    BUN 44 (H) 2024    CREATININE 1.1 2024    CALCIUM 8.9 2024    ANIONGAP 8 2024    EGFRNORACEVR 49 (A) 2024 " "    Lab Results   Component Value Date    CALCIUM 8.9 12/07/2024    PHOS 3.0 12/06/2024     Lab Results   Component Value Date    ALBUMIN 2.4 (L) 12/06/2024     Lab Results   Component Value Date    ALT 17 12/06/2024    AST 18 12/06/2024    ALKPHOS 90 12/06/2024    BILITOT 0.5 12/06/2024     No results for input(s): "POCTGLUCOSE" in the last 24 hours.    Lab Results   Component Value Date    HGBA1C 6.3 (H) 06/28/2016     Lab Results   Component Value Date    WBC 9.61 12/07/2024    HGB 10.8 (L) 12/07/2024    HCT 33.1 (L) 12/07/2024    MCV 82 12/07/2024     12/07/2024       Scheduled Meds:   albuterol-ipratropium  3 mL Nebulization Q6H WAKE    ampicillin-sulbactam  3 g Intravenous Q12H    arformoteroL  15 mcg Nebulization BID    atorvastatin  40 mg Oral Daily    budesonide  0.5 mg Nebulization Q12H    dexAMETHasone  4 mg Intravenous Daily    gabapentin  100 mg Oral TID    heparin (porcine)  5,000 Units Subcutaneous Q8H    LIDOcaine  1 patch Transdermal Q24H    morphine  2 mg Intravenous Once    pantoprazole  40 mg Oral Daily    polyethylene glycol  17 g Oral Daily    senna-docusate 8.6-50 mg  1 tablet Oral Daily    sertraline  100 mg Oral QHS    sodium chloride 0.9%  10 mL Intravenous Q8H     Continuous Infusions:  PRN Meds:.  Current Facility-Administered Medications:     acetaminophen, 650 mg, Oral, Q4H PRN    dextrose 10%, 12.5 g, Intravenous, PRN    dextrose 10%, 25 g, Intravenous, PRN    HYDROmorphone, 1 mg, Intravenous, Q4H PRN    influenza (adjuvanted), 0.5 mL, Intramuscular, Prior to discharge    melatonin, 6 mg, Oral, Nightly PRN    morphine, 0.5 mg, Intravenous, Q6H PRN    naloxone, 0.02 mg, Intravenous, PRN    ondansetron, 8 mg, Oral, Q8H PRN    ondansetron, 4 mg, Intravenous, Q6H PRN    oxyCODONE-acetaminophen, 1 tablet, Oral, Q4H PRN    polyethylene glycol, 17 g, Oral, Daily PRN    promethazine, 25 mg, Oral, Q6H PRN    senna-docusate 8.6-50 mg, 1 tablet, Oral, BID PRN      Physical " Findings/Assessment         Estimated/Assessed Needs    Weight Used For Calorie Calculations: 81.2 kg (179 lb 0.2 oz)  Energy Calorie Requirements (kcal): 6300-9242 kcals (Cancer)  Energy Need Method: Kcal/kg  Protein Requirements: 65-81 g (0.8-1.0 g/kg ABW (NICKI, no dialysis vs Cancer)  Weight Used For Protein Calculations: 81.2 kg (179 lb 0.2 oz)  Fluid Requirements (mL): 500 mL + total output (NICKI)  Estimated Fluid Requirement Method: other (see comments)  RDA Method (mL): 2030  CHO Requirement: 253-305 g (9388-1244 kcals/8)      Nutrition Prescription Ordered    Current Diet Order: Cardiac, Soft and bite-sized (IDDSI Level 6) diet  Oral Nutrition Supplement: Suplena TID    Evaluation of Received Nutrient/Fluid Intake  I/O: (Net since admit):  12/4/24: -864 mL  12/10/24: -779.1 mL    Energy Calories Required: not meeting needs  Protein Required: not meeting needs  Fluid Required: not meeting needs  Total Fluid Intake (mL): 200  Total Fluid Output (mL): 950  Tolerance: tolerating  % Intake of Estimated Energy Needs: 0 - 25 %  % Meal Intake: 0 - 25 %    Nutrition Risk    Level of Risk/Frequency of Follow-up: high (F/u x 2 weekly)     Monitor and Evaluation    Food and Nutrient Intake: energy intake, food and beverage intake  Food and Nutrient Adminstration: diet order  Knowledge/Beliefs/Attitudes: food and nutrition knowledge/skill, beliefs and attitudes  Anthropometric Measurements: weight, weight change, body mass index  Biochemical Data, Medical Tests and Procedures: electrolyte and renal panel     Nutrition Follow-Up    RD Follow-up?: Yes  Neetu Castellano, BS, RDN, LDN

## 2024-12-10 NOTE — ASSESSMENT & PLAN NOTE
Better pain control with dexamethasone  Consider continuing PO Decadron 4 mg PO BID upon discharge for pain control.

## 2024-12-10 NOTE — PLAN OF CARE
[facility] - Med Surg  Discharge Final Note    Primary Care Provider: Liliya Freitas DO    Expected Discharge Date: 12/9/2024    Final Discharge Note (most recent)       Final Note - 12/10/24 1423          Final Note    Assessment Type Final Discharge Note     Anticipated Discharge Disposition Hospice/Home        Post-Acute Status    Post-Acute Authorization Hospice     Hospice Status Set-up Complete/Auth obtained     Coverage [employer]                              Contact Info       Liliya Freitas DO   Specialty: Family Medicine   Relationship: PCP - General    [address] [place] [id number]   Phone: [phone]       Next Steps: Follow up in 1 week(s)          Pt going home with [facility].

## 2024-12-10 NOTE — PROGRESS NOTES
[facility]  Palliative Medicine  Progress Note    Patient Name: Katrina Duran  MRN: [id number]  Admission Date: 12/3/2024  Hospital Length of Stay: 6 days  Code Status: DNR   Attending Provider: Romulo Jones,*  Consulting Provider: Valery Bass NP  Primary Care Physician: Liliya Freitas DO  Principal Problem:Closed fracture of third lumbar vertebra    Patient information was obtained from relative(s) and primary team.      Assessment/Plan:     Oncology  Metastasis to bone  Better pain control with dexamethasone  Consider continuing PO Decadron 4 mg PO BID upon discharge for pain control.       Neoplasm related pain  Hydromorphone, morphine, oxycodone PRN  Better sx mgmt overall with dexamethasone   Plans to admit to hospice upon discharge.    GI  Nausea & vomiting  Sx better controlled with dexamethasone.  Continues constipation mgmt per HM  Ondansetron PRN    Palliative Care  Encounter for palliative care  Goals of care: Support/comfort. She would like to die in her home  Advanced directive: DNR. Will defer [place] to hospice  HC POA: none. . Wants her children tos hare decision making  Symptoms: back pain, leg pain, nausea  Follow up: Will follow during this admission. Plans to discharge home with hospice and support of children.          I will sign off. Please contact us if you have any additional questions.    Subjective:     Chief Complaint:   Chief Complaint   Patient presents with    Back Pain     Pt. Presents to ED via [facility] due to having back pain and decreased appetite for the past week. Per EMS family states pt was seen in this ED over the weekend and found her Lung CA spread to her liver not actively on Chemo. Pt was given 15mg of toradol and 4mg of zofran en route to ED        HPI:   Katrina Duran is a 84 y.o. female with PMHx of HTN, COPD, Lung Ca and CAD s/p CABG in 1999 and stent in 2006, brought to ER by EMS for severe lower back pain that radiates to her right leg since  July of this year. Pt reports she is unable to stand and ambulate, like her legs are giving out upon standing and experiences excruciating pain when getting out of bed. Pt notes that prior to ED visit, she urinated at around 12:30 AM. She denies any numbness upon wiping. Pt's daughter reports she has not eaten in the last few days. Pt reports having a subjective fever but no measured oral temperature. She also c/o nausea. Pt denies any fall, cough, CP, SOB, dysuria, and abd pain.      Pt has hx lung cancer and received radiation in November of 2023. She had appointment with her PCP yesterday and was told her lung cancer has increased in size. Pt was prescribed 5 mg Percocet yesterday by her PCP to manage her pain. Pt was also seen in ED last Saturday. She has an upcoming appointment with pain management.      In the ER, VSS Afeb, labs showed WBC 12.4, H/H 11/35, Segs 92Na 134, HCo3 17, Bun/Cr  43/1.5. Lactate 1.8, BNP 1873. UA ++ve on 12/1/24.      CT L/S on 12/1-  subtle nondisplaced fracture involving the inferior, posterior and left lateral corner of the L3 vertebral body. Marked degenerative disc changes and degenerative facet arthropathy noted diffusely with marked convex left curvature of the lumbar spine.  Multilevel, multifactorial moderate to severe spinal canal stenosis and nerve root foramen narrowing, most significantly involving the L3/L4 disc level.     3.  Aortic stent graft in place, with similar amount of mild indistinction surrounding the stent graft when compared to the prior study, of doubtful clinical significance.     CTA Chest Abdomen and Pelvis: 12/1/24  1.  Detrimental change.  Interval increase in angular opacity within the lingula, likely a combination of partial collapse with possible underlying mass.  Interval increase in size of semi-solid right lower lobe nodule with increase central solid component, currently measuring 4.2 cm.  Interval development of a 7 mm spiculated nodule in the  right lung apex.  Interval development of at least 2 hepatic masses measuring up to 3.9 cm.  These changes must be considered neoplastic in origin until proven otherwise.  A repeat PET CT scan would be helpful.  4.  Negative for pulmonary embolism, aneurysm or dissection.  Negative for intraperitoneal free air, free fluid or hemoperitoneum.     Hosp Med consulted and pt admitted for acute intractable Lumbar pain sec to L3 vertebral fracture and metastatic Lung Ca with B lung mets as well as Liver mets. Pt also has UTI, NICKI and Dehydration. Elevated BNP likely sec to NICKI. Pt started on IV rocephin, NS, Norco and IV MS for pain control. Will consult XRT for possible L/S Palliative Radiation. NSGY was also contacted and did not consider it a neurosurgical case.          Overview/Hospital Course:  12/4/24  Admitted for back pain x 1 week  Pain not well controlled, adjusted PRN regimen  Found to have L3 fracture  RN reports patient retained 650 cc overnight, required straight cath  Noted urinary retention again this afternoon  Shelton catheter ordered  ---  Patient with history of abdominal aorta repair with hardware.  Per MRI department, no documentation at this time to investigate if obtaining MRI is safe at this time.  Patient reportedly had an MRI done at outside facility several months ago, medical records pending.  MRI needed to evaluate for possible metastatic spread in setting of underlying lung cancer.  Patient wishes to transfer her care to [facility]. Heme/Onc following.    12/5/24  Reviewed overnight events, transferred to ICU due to lethargy, hypoxia (lowest O2 sat 77%).  Possibly aspirated vomitus. BNP elevated 1800. IV fluids were stopped  Started on IV antibiotics, currently on Zosyn.  Currently on 2L NC  Stable to return to floor    MRI lumbar spine was done on Oct 4th 2024 at [facility] in [place], [place] - phone number [phone].   There office will send the report to fax number in ICU -  [phone].  Updated MRI/Radiology staff via secure chat.  Updated patient's family members in waiting room.     12/6/24  NAEON, patient states no pain when laying still  Severe pain with movement, controlled with current PRN regimen  Plans for MRI this evening  Currently no Neurosurgical coverage at this time  Discussed possibly transferring to [facility], pending MRI findings  Updated patient's family at bedside     12/07/2024   Patient in mild distress with ongoing pain   Alert and oriented   MRI thoracic, lumbar spine-findings suggestive of bone metastasis  Discussed findings with patient/son at bedside, daughters over phone-- given patient's age, underlying comorbidities, acute issues, opting to proceed with hospice; declined any further interventions, neurosurgical consultation at this point.    Open for possible palliative radiation therapy, consulted radiation oncologist per Heme-Onc recommendations   Consulted palliative for symptom management   Blood culture x2 as of 12/04/2024 positive for Gram-positive cocci, currently on empiric antibiotics, ordered for repeat blood cultures, id follow up      12/08/2024   Alert awake, oriented x3   Resting comfortably, complaining of intermittent pain, currently on pain regimen   Ordered x-ray left femur/hip per Radiation Oncology recommendations,; radiation oncology planning for trended to radiation therapy tomorrow ;  PICC line ordered by overnight team due to poor venous access;   Consulted  for hospice   Repeat blood cultures from yesterday negative to date   Anticipate discharge in next 24-48 hours to home under hospice  Discussed plan of care with patient/daughter at bedside, agreed    Oncology:  Metastasis to bone  Will defer to Radiation therapy for painful bone metastasis for relief of discomfort and pain  Agree entirely with do not resuscitate order and referral to hospice once palliative radiation is completed  Widespread metastatic  non-small cell lung carcinoma.  Patient has disease in lung liver.  She does not wish to take systemic therapy but does wish palliative radiation therapy to areas that are painful and bone.  I had very long discussion with her and her daughter at bedside strongly recommend hospice care she lives in the [facility] and will defer to  which hospice is have inpatient units associated with them [facility]   ID:  --Recommend discussing CTA with IR to ensure masses not indicative of abscess that may need aspiration; possible source of bacteremia   --TTE does not report vegetations   --Follow repeat blood cx for clearance; NGTD    --Palliative care following; plans to discharge on hospice  --Not OPAT candidate   --Recommend 14 day course of PO amoxicillin 1 g TID from negative blood cx; stop date 12/20/24  --Will schedule virtual follow up     Hospital Course:  No notes on file    Interval History: Resting comfortably today. Daughter Shannon at bedside. Plans to go home with two weeks PO abx and hospice per daughter. Much more comfortable. Received oxycodone x1 dose this morning. Still no BM. Enema today, no results. Daughter reports no further nausea. Tolerated ice cream this morning.     Medications:  Continuous Infusions:  Scheduled Meds:   albuterol-ipratropium  3 mL Nebulization Q6H WAKE    ampicillin-sulbactam  3 g Intravenous Q12H    arformoteroL  15 mcg Nebulization BID    atorvastatin  40 mg Oral Daily    budesonide  0.5 mg Nebulization Q12H    dexAMETHasone  4 mg Intravenous Daily    gabapentin  100 mg Oral TID    heparin (porcine)  5,000 Units Subcutaneous Q8H    LIDOcaine  1 patch Transdermal Q24H    morphine  2 mg Intravenous Once    pantoprazole  40 mg Oral Daily    polyethylene glycol  17 g Oral Daily    senna-docusate 8.6-50 mg  1 tablet Oral Daily    sertraline  100 mg Oral QHS    sodium chloride 0.9%  10 mL Intravenous Q8H     PRN Meds:  Current Facility-Administered Medications:      "acetaminophen, 650 mg, Oral, Q4H PRN    dextrose 10%, 12.5 g, Intravenous, PRN    dextrose 10%, 25 g, Intravenous, PRN    HYDROmorphone, 1 mg, Intravenous, Q4H PRN    influenza (adjuvanted), 0.5 mL, Intramuscular, Prior to discharge    melatonin, 6 mg, Oral, Nightly PRN    morphine, 0.5 mg, Intravenous, Q6H PRN    naloxone, 0.02 mg, Intravenous, PRN    ondansetron, 8 mg, Oral, Q8H PRN    ondansetron, 4 mg, Intravenous, Q6H PRN    oxyCODONE-acetaminophen, 1 tablet, Oral, Q4H PRN    polyethylene glycol, 17 g, Oral, Daily PRN    promethazine, 25 mg, Oral, Q6H PRN    senna-docusate 8.6-50 mg, 1 tablet, Oral, BID PRN    Objective:     Vital Signs (Most Recent):  Temp: 98.1 °F (36.7 °C) (12/10/24 0751)  Pulse: 74 (12/10/24 0811)  Resp: 18 (12/10/24 0811)  BP: (!) 159/70 (12/10/24 0751)  SpO2: 95 % (12/10/24 0811) Vital Signs (24h Range):  Temp:  [97.6 °F (36.4 °C)-98.5 °F (36.9 °C)] 98.1 °F (36.7 °C)  Pulse:  [69-81] 74  Resp:  [16-18] 18  SpO2:  [89 %-96 %] 95 %  BP: (108-181)/(53-76) 159/70     Weight: 81.2 kg (179 lb)  Body mass index is 31.71 kg/m².       Physical Exam       Palliative Exam    Advance Care Planning  Advance Care Planning       Significant Labs: All pertinent labs within the past 24 hours have been reviewed.  CBC:   Recent Labs   Lab 12/07/24  0550   WBC 9.61   HGB 10.8*   HCT 33.1*   MCV 82        BMP:  No results for input(s): "GLU", "NA", "K", "CL", "CO2", "BUN", "CREATININE", "CALCIUM", "MG" in the last 24 hours.  LFT:  Lab Results   Component Value Date    AST 18 12/06/2024    ALKPHOS 90 12/06/2024    BILITOT 0.5 12/06/2024     Albumin:   Albumin   Date Value Ref Range Status   12/06/2024 2.4 (L) 3.5 - 5.2 g/dL Final     Protein:   Total Protein   Date Value Ref Range Status   12/06/2024 5.4 (L) 6.0 - 8.4 g/dL Final     Lactic acid:   Lab Results   Component Value Date    LACTATE 2.2 12/04/2024    LACTATE 1.8 12/03/2024       Significant Imaging: I have reviewed all pertinent imaging " results/findings within the past 24 hours.    Valery Bass NP  Palliative Medicine  [facility]

## 2024-12-10 NOTE — PLAN OF CARE
Nutrition Recommendations/Interventions for malnutrition 12/10/24:   1. Recommend modify pt's diet to a Regular diet, texture per SLP recommendations   2. Recommend pt continues Suplena TID to assist filling nutritional gaps   3. Encourage PO and supplement intake, recommend feeding assistance   4. Recommend anti nasuea medication as warranted   5. Recommend continue bowel regimen (no BM x 10 days) as warranted   6. Weigh twice weekly  7. Collaboration by nutrition professional with other providers     Goals:   1. Pt will tolerate and consume >75% EEN and EPN prior to RD follow up   2. Pt's nausea will be resolved prior to RD follow up (Resolved)  3. Pt will have a BM prior to RD follow up    BERNY Guidry, RDN, LDN

## 2024-12-10 NOTE — SUBJECTIVE & OBJECTIVE
Interval History: Resting comfortably today. Daughter Shannon at bedside. Plans to go home with two weeks PO abx and hospice per daughter. Much more comfortable. Received oxycodone x1 dose this morning. Still no BM. Enema today, no results. Daughter reports no further nausea. Tolerated ice cream this morning.     Medications:  Continuous Infusions:  Scheduled Meds:   albuterol-ipratropium  3 mL Nebulization Q6H WAKE    ampicillin-sulbactam  3 g Intravenous Q12H    arformoteroL  15 mcg Nebulization BID    atorvastatin  40 mg Oral Daily    budesonide  0.5 mg Nebulization Q12H    dexAMETHasone  4 mg Intravenous Daily    gabapentin  100 mg Oral TID    heparin (porcine)  5,000 Units Subcutaneous Q8H    LIDOcaine  1 patch Transdermal Q24H    morphine  2 mg Intravenous Once    pantoprazole  40 mg Oral Daily    polyethylene glycol  17 g Oral Daily    senna-docusate 8.6-50 mg  1 tablet Oral Daily    sertraline  100 mg Oral QHS    sodium chloride 0.9%  10 mL Intravenous Q8H     PRN Meds:  Current Facility-Administered Medications:     acetaminophen, 650 mg, Oral, Q4H PRN    dextrose 10%, 12.5 g, Intravenous, PRN    dextrose 10%, 25 g, Intravenous, PRN    HYDROmorphone, 1 mg, Intravenous, Q4H PRN    influenza (adjuvanted), 0.5 mL, Intramuscular, Prior to discharge    melatonin, 6 mg, Oral, Nightly PRN    morphine, 0.5 mg, Intravenous, Q6H PRN    naloxone, 0.02 mg, Intravenous, PRN    ondansetron, 8 mg, Oral, Q8H PRN    ondansetron, 4 mg, Intravenous, Q6H PRN    oxyCODONE-acetaminophen, 1 tablet, Oral, Q4H PRN    polyethylene glycol, 17 g, Oral, Daily PRN    promethazine, 25 mg, Oral, Q6H PRN    senna-docusate 8.6-50 mg, 1 tablet, Oral, BID PRN    Objective:     Vital Signs (Most Recent):  Temp: 98.1 °F (36.7 °C) (12/10/24 0751)  Pulse: 74 (12/10/24 0811)  Resp: 18 (12/10/24 0811)  BP: (!) 159/70 (12/10/24 0751)  SpO2: 95 % (12/10/24 0811) Vital Signs (24h Range):  Temp:  [97.6 °F (36.4 °C)-98.5 °F (36.9 °C)] 98.1 °F (36.7  "°C)  Pulse:  [69-81] 74  Resp:  [16-18] 18  SpO2:  [89 %-96 %] 95 %  BP: (108-181)/(53-76) 159/70     Weight: 81.2 kg (179 lb)  Body mass index is 31.71 kg/m².       Physical Exam       Palliative Exam    Advance Care Planning   Advance Care Planning       Significant Labs: All pertinent labs within the past 24 hours have been reviewed.  CBC:   Recent Labs   Lab 12/07/24  0550   WBC 9.61   HGB 10.8*   HCT 33.1*   MCV 82        BMP:  No results for input(s): "GLU", "NA", "K", "CL", "CO2", "BUN", "CREATININE", "CALCIUM", "MG" in the last 24 hours.  LFT:  Lab Results   Component Value Date    AST 18 12/06/2024    ALKPHOS 90 12/06/2024    BILITOT 0.5 12/06/2024     Albumin:   Albumin   Date Value Ref Range Status   12/06/2024 2.4 (L) 3.5 - 5.2 g/dL Final     Protein:   Total Protein   Date Value Ref Range Status   12/06/2024 5.4 (L) 6.0 - 8.4 g/dL Final     Lactic acid:   Lab Results   Component Value Date    LACTATE 2.2 12/04/2024    LACTATE 1.8 12/03/2024       Significant Imaging: I have reviewed all pertinent imaging results/findings within the past 24 hours.  "

## 2024-12-10 NOTE — DISCHARGE SUMMARY
[facility] Medicine  Discharge Summary      Patient Name: Katrina Duran  MRN: [id number]  JORDAN: [id number]  Patient Class: IP- Inpatient  Admission Date: 12/3/2024  Hospital Length of Stay: 6 days  Discharge Date and Time: 12/10/24  Attending Physician: Romulo Jones,*   Discharging Provider: Romulo Jones MD  Primary Care Provider: Liliya Freitas DO    Primary Care Team: Networked reference to record PCT     HPI:   Katrina Duran is a 84 y.o. female with PMHx of HTN, COPD, Lung Ca and CAD s/p CABG in 1999 and stent in 2006, brought to ER by EMS for severe lower back pain that radiates to her right leg since July of this year. Pt reports she is unable to stand and ambulate, like her legs are giving out upon standing and experiences excruciating pain when getting out of bed. Pt notes that prior to ED visit, she urinated at around 12:30 AM. She denies any numbness upon wiping. Pt's daughter reports she has not eaten in the last few days. Pt reports having a subjective fever but no measured oral temperature. She also c/o nausea. Pt denies any fall, cough, CP, SOB, dysuria, and abd pain.     Pt has hx lung cancer and received radiation in November of 2023. She had appointment with her PCP yesterday and was told her lung cancer has increased in size. Pt was prescribed 5 mg Percocet yesterday by her PCP to manage her pain. Pt was also seen in ED last Saturday. She has an upcoming appointment with pain management.      In the ER, VSS Afeb, labs showed WBC 12.4, H/H 11/35, Segs 92Na 134, HCo3 17, Bun/Cr  43/1.5. Lactate 1.8, BNP 1873. UA ++ve on 12/1/24.     CT L/S on 12/1-  subtle nondisplaced fracture involving the inferior, posterior and left lateral corner of the L3 vertebral body. Marked degenerative disc changes and degenerative facet arthropathy noted diffusely with marked convex left curvature of the lumbar spine.  Multilevel, multifactorial moderate to severe spinal canal stenosis  and nerve root foramen narrowing, most significantly involving the L3/L4 disc level.     3.  Aortic stent graft in place, with similar amount of mild indistinction surrounding the stent graft when compared to the prior study, of doubtful clinical significance.    CTA Chest Abdomen and Pelvis: 12/1/24  1.  Detrimental change.  Interval increase in angular opacity within the lingula, likely a combination of partial collapse with possible underlying mass.  Interval increase in size of semi-solid right lower lobe nodule with increase central solid component, currently measuring 4.2 cm.  Interval development of a 7 mm spiculated nodule in the right lung apex.  Interval development of at least 2 hepatic masses measuring up to 3.9 cm.  These changes must be considered neoplastic in origin until proven otherwise.  A repeat PET CT scan would be helpful.  4.  Negative for pulmonary embolism, aneurysm or dissection.  Negative for intraperitoneal free air, free fluid or hemoperitoneum.    Hosp Med consulted and pt admitted for acute intractable Lumbar pain sec to L3 vertebral fracture and metastatic Lung Ca with B lung mets as well as Liver mets. Pt also has UTI, NICKI and Dehydration. Elevated BNP likely sec to NICKI. Pt started on IV rocephin, NS, Norco and IV MS for pain control. Will consult XRT for possible L/S Palliative Radiation. NSGY was also contacted and did not consider it a neurosurgical case.         * No surgery found *      Hospital Course:     12/4/24  Admitted for back pain x 1 week  Pain not well controlled, adjusted PRN regimen  Found to have L3 fracture  RN reports patient retained 650 cc overnight, required straight cath  Noted urinary retention again this afternoon  Shelton catheter ordered  ---  Patient with history of abdominal aorta repair with hardware.  Per MRI department, no documentation at this time to investigate if obtaining MRI is safe at this time.  Patient reportedly had an MRI done at outside  facility several months ago, medical records pending.  MRI needed to evaluate for possible metastatic spread in setting of underlying lung cancer.  Patient wishes to transfer her care to [facility]. Heme/Onc following.    12/5/24  Reviewed overnight events, transferred to ICU due to lethargy, hypoxia (lowest O2 sat 77%).  Possibly aspirated vomitus. BNP elevated 1800. IV fluids were stopped  Started on IV antibiotics, currently on Zosyn.  Currently on 2L NC  Stable to return to floor  ---  MRI lumbar spine was done on Oct 4th 2024 at [facility] in [place], [place] - phone number [phone].   There office will send the report to fax number in ICU - [phone].  Updated MRI/Radiology staff via secure chat.  Updated patient's family members in waiting room.    12/6/24  NAEON, patient states no pain when laying still  Severe pain with movement, controlled with current PRN regimen  Plans for MRI this evening  Currently no Neurosurgical coverage at this time  Discussed possibly transferring to [facility], pending MRI findings  Updated patient's family at bedside    12/07/2024   Patient in mild distress with ongoing pain   Alert and oriented   MRI thoracic, lumbar spine-findings suggestive of bone metastasis  Discussed findings with patient/son at bedside, daughters over phone-- given patient's age, underlying comorbidities, acute issues, opting to proceed with hospice; declined any further interventions, neurosurgical consultation at this point.    Open for possible palliative radiation therapy, consulted radiation oncologist per Heme-Onc recommendations   Consulted palliative for symptom management   Blood culture x2 as of 12/04/2024 positive for Gram-positive cocci, currently on empiric antibiotics, ordered for repeat blood cultures, id follow up     12/08/2024   Alert awake, oriented x3   Resting comfortably, complaining of intermittent pain, currently on pain regimen   Ordered x-ray left femur/hip per Radiation  Oncology recommendations,; radiation oncology planning for trended to radiation therapy tomorrow   Consulted  for hospice   Repeat blood cultures from yesterday negative to date   Anticipate discharge in next 24-48 hours to home under hospice  Discussed plan of care with patient/daughter at bedside, agreed    12/9/24  Status post palliative radiation today, followed by pain, nausea-- currently on Decadron, adjusted pain medications, ordered IV antiemetics   Given significant pain, nausea, patient/family prefers to be monitored overnight, based on symptom improvement, we will aim for discharge in next 24-48 hours      12/10/2024   Examination done at bedside, resting comfortably, alert awake   Stated improvement in pain, discomfort   Patient/family agreeable for discharge to home under hospice today,  worked on arrangements for the discharge process   Discussed with ID regarding antibiotic regimen, appreciate recommendations, ordered medications accordingly   Discharge today to home under hospice through hospice [facility]     Goals of Care Treatment Preferences:  Code Status: DNR          What is most important right now is to focus on avoiding the hospital, symptom/pain control, improvement in condition but with limits to invasive therapies, comfort and QOL .  Accordingly, we have decided that the best plan to meet the patient's goals includes continuing with treatment.      SDOH Screening:  The patient was screened for utility difficulties, food insecurity, transport difficulties, housing insecurity, and interpersonal safety and there were no concerns identified this admission.     Consults:   Consults (From admission, onward)          Status Ordering Provider     Inpatient consult to Social Work  Once        Provider:  (Not yet assigned)    Completed TOBY COUGHLIN     Inpatient consult to Social Work  Once        Provider:  (Not yet assigned)    Completed LATOSHA BLACK      Inpatient consult to PICC team (NIAS)  Once        Provider:  (Not yet assigned)    Acknowledged FREDDY SMITH     Inpatient consult to Palliative Care  Once        Provider:  Valery Bass NP    Completed LATOSHA BLACK     Inpatient consult to Radiation Oncology  Once        Provider:  Konstantin Yost MD    Acknowledged LATOSHA BLACK     Inpatient consult to Infectious Diseases  Once        Provider:  Kale Mcwilliams DO    Completed LATOSHA BLACK RBert     Inpatient consult to Hematology/Oncology  Once        Provider:  Rochelle Collazo MD    Acknowledged LATOSHA BLACK     Inpatient consult to Critical Care Medicine  Once        Provider:  (Not yet assigned)    Completed FREDDY SMITH     Case Management/  Once        Provider:  (Not yet assigned)    Completed HANY MCCANN ABert     Inpatient consult to Hematology/Oncology  Once        Provider:  Kale Mckeon MD    Completed RAMY, MAJID ABert     Inpatient consult to Registered Dietitian/Nutritionist  Once        Provider:  (Not yet assigned)    Completed RAMYHANY PRO ABert     Inpatient consult to Neurosurgery  Once        Provider:  (Not yet assigned)    Acknowledged NATASHA ESPINOZA            * Closed fracture of third lumbar vertebra  Multimodal pain control  PT/OT  NSGY and Oncology consult  Prognosis poor on account of underlying Lung Ca    12/4/24  MRI spine ordered to evaluate for metastatic disease  Unable to obtain due to concerns for MRI safety  Medical records pending, patient reports undergoing MRI previously    12/5/24  MRI lumbar spine was done on Oct 4th 2024 at [facility] in [place], [place] - phone number [phone].   There office will send the report to fax number in icu - [phone].  Updated MRI/Radiology staff via secure chat.    Urinary retention  Urinary retention noted soon after admission  Bladder scan x 2 with high residuals  UA with evidence of infectious  etiology  Possibly related to spinal fracture, MRI pending  Consider voiding trial over weekend      NICKI (acute kidney injury)  NICKI is likely due to pre-renal azotemia due to dehydration. Baseline creatinine is  1.1 . Most recent creatinine and eGFR are listed below.  Recent Labs     12/04/24  0515 12/04/24 2026 12/05/24  0318   CREATININE 1.5* 1.3 1.3   EGFRNORACEVR 34* 40* 40*        Plan  - NICKI is improving  - Avoid nephrotoxins and renally dose meds for GFR listed above  - Monitor urine output, serial BMP, and adjust therapy as needed    Non-small cell lung cancer  Patient has Known metastatic cancer of Lung primary. The cancer has metastasized to Liver, opposite Lung and possibly Lumbar Spine. The patient is not under the care of an outpatient oncologist. The patient is not undergoing active chemotherapy. Their staging information is listed below.     Oncology following, [facility] MRI spine  Multimodal pain control      UTI (urinary tract infection)  As seen on UA 12/1- asymptomatic, no obvious dysuria sec to severe LBP  Will Treat with IVF and IV Rocephin  Urine Cx from 12/1- Neg    12/4/24  Ceftriaxone day # 2  Shelton placed due to urinary retention    12/5/24  Urine culture no growth  Ceftriaxone stopped      Pneumonia of left lung due to infectious organism  Patient has a diagnosis of pneumonia. The cause of the pneumonia is suspected to be bacterial in etiology but organism is not known. The pneumonia is worsening due to O2 requirement . The patient has the following signs/symptoms of pneumonia: persistent hypoxia . The patient does have a current oxygen requirement and the patient does not have a home oxygen requirement. I have reviewed the pertinent imaging. The following cultures have been collected: Blood cultures The culture results are listed below.     Current antimicrobial regimen consists of the antibiotics listed below. Will monitor patient closely and continue current treatment plan  unchanged.    Antibiotics (From admission, onward)      Start     Stop Route Frequency Ordered    12/05/24 1230  piperacillin-tazobactam (ZOSYN) 4.5 g in D5W 100 mL IVPB (MB+)         -- IV Every 8 hours (non-standard times) 12/05/24 0939    12/05/24 1045  mupirocin 2 % ointment         12/10/24 0859 Nasl 2 times daily 12/05/24 0941            Microbiology Results (last 7 days)       Procedure Component Value Units Date/Time    Blood culture [[id number]] Collected: 12/04/24 2147    Order Status: Completed Specimen: Blood Updated: 12/05/24 0915     Blood Culture, Routine No Growth to date    Narrative:      #2    Blood culture [[id number]] Collected: 12/04/24 2147    Order Status: Completed Specimen: Blood Updated: 12/05/24 0915     Blood Culture, Routine No Growth to date    Culture, MRSA [[id number]] Collected: 12/04/24 2029    Order Status: Sent Specimen: MRSA source from Nares, Left Updated: 12/05/24 0137    Respiratory Infection Panel (PCR), Nasopharyngeal [[id number]] Collected: 12/04/24 2031    Order Status: Completed Specimen: Nasopharyngeal Swab Updated: 12/05/24 0014     Respiratory Infection Panel Source NP swab     Adenovirus Not Detected     Coronavirus 229E, Common Cold Virus Not Detected     Coronavirus HKU1, Common Cold Virus Not Detected     Coronavirus NL63, Common Cold Virus Not Detected     Coronavirus OC43, Common Cold Virus Not Detected     Comment: The Coronavirus strains detected in this test cause the common cold.  These strains are not the COVID-19 (novel Coronavirus)strain   associated with the respiratory disease outbreak.          SARS-CoV2 (COVID-19) Qualitative PCR Not Detected     Human Metapneumovirus Not Detected     Human Rhinovirus/Enterovirus Not Detected     Influenza A (subtypes H1, H1-2009,H3) Not Detected     Influenza B Not Detected     Parainfluenza Virus 1 Not Detected     Parainfluenza Virus 2 Not Detected     Parainfluenza Virus 3 Not Detected     Parainfluenza Virus 4  Not Detected     Respiratory Syncytial Virus Not Detected     Bordetella Parapertussis (FK5579) Not Detected     Bordetella pertussis (ptxP) Not Detected     Chlamydia pneumoniae Not Detected     Mycoplasma pneumoniae Not Detected     Comment: Respiratory Infection Panel testing performed by Multiplex PCR.       Narrative:      Assay not valid for lower respiratory specimens, alternate  testing required.    Culture, Respiratory with Gram Stain [[id number]] Collected: 12/04/24 2029    Order Status: Sent Specimen: Respiratory from Endotracheal Aspirate Updated: 12/04/24 2029              Final Active Diagnoses:    Diagnosis Date Noted POA    PRINCIPAL PROBLEM:  Closed fracture of third lumbar vertebra [S32.039A] 12/03/2024 Yes    Nausea & vomiting [R11.2] 12/09/2024 Unknown    Encounter for palliative care [Z51.5] 12/09/2024 Not Applicable    Enterococcal bacteremia [R78.81, B95.2] 12/09/2024 Yes    Liver lesion [K76.9] 12/07/2024 Yes    Neoplasm related pain [G89.3] 12/07/2024 Yes    Metastasis to bone [C79.51] 12/07/2024 Unknown    Urinary retention [R33.9] 12/06/2024 Yes    Acute hypoxemic respiratory failure [J96.01] 12/05/2024 No    Advanced care planning/counseling discussion [Z71.89] 12/05/2024 Not Applicable    UTI (urinary tract infection) [N39.0] 12/03/2024 Yes    Non-small cell lung cancer [C34.90] 12/03/2024 Yes    NICKI (acute kidney injury) [N17.9] 12/03/2024 Yes    Pneumonia of left lung due to infectious organism [J18.9] 12/14/2017 Yes    Adenocarcinoma of left lung [C34.92] 11/01/2016 Yes    HLD (hyperlipidemia) [E78.5] 06/28/2016 Yes      Problems Resolved During this Admission:       Discharged Condition: poor    Disposition: Home or Self Care    Follow Up:   Follow-up Information       Liliya Freitas, DO Follow up in 1 week(s).    Specialty: Family Medicine  Contact information:  [address] [place] [id number] [phone]                           Patient Instructions:   No  discharge procedures on file.    Significant Diagnostic Studies:    Results for orders placed or performed during the hospital encounter of 12/03/24   CBC Auto Differential    Collection Time: 12/03/24 12:25 PM   Result Value Ref Range    WBC 12.39 3.90 - 12.70 K/uL    RBC 4.18 4.00 - 5.40 M/uL    Hemoglobin 11.1 (L) 12.0 - 16.0 g/dL    Hematocrit 34.8 (L) 37.0 - 48.5 %    MCV 83 82 - 98 fL    MCH 26.6 (L) 27.0 - 31.0 pg    MCHC 31.9 (L) 32.0 - 36.0 g/dL    RDW 15.2 (H) 11.5 - 14.5 %    Platelets 130 (L) 150 - 450 K/uL    MPV 10.7 9.2 - 12.9 fL    Immature Granulocytes 0.6 (H) 0.0 - 0.5 %    Gran # (ANC) 11.4 (H) 1.8 - 7.7 K/uL    Immature Grans (Abs) 0.08 (H) 0.00 - 0.04 K/uL    Lymph # 0.2 (L) 1.0 - 4.8 K/uL    Mono # 0.5 0.3 - 1.0 K/uL    Eos # 0.3 0.0 - 0.5 K/uL    Baso # 0.02 0.00 - 0.20 K/uL    nRBC 0 0 /100 WBC    Gran % 91.9 (H) 38.0 - 73.0 %    Lymph % 1.4 (L) 18.0 - 48.0 %    Mono % 3.9 (L) 4.0 - 15.0 %    Eosinophil % 2.0 0.0 - 8.0 %    Basophil % 0.2 0.0 - 1.9 %    Platelet Estimate Decreased (A)     Differential Method Automated    Comprehensive Metabolic Panel    Collection Time: 12/03/24 12:25 PM   Result Value Ref Range    Sodium 134 (L) 136 - 145 mmol/L    Potassium 4.4 3.5 - 5.1 mmol/L    Chloride 104 95 - 110 mmol/L    CO2 17 (L) 23 - 29 mmol/L    Glucose 142 (H) 70 - 110 mg/dL    BUN 43 (H) 8 - 23 mg/dL    Creatinine 1.5 (H) 0.5 - 1.4 mg/dL    Calcium 9.1 8.7 - 10.5 mg/dL    Total Protein 6.5 6.0 - 8.4 g/dL    Albumin 3.0 (L) 3.5 - 5.2 g/dL    Total Bilirubin 0.9 0.1 - 1.0 mg/dL    Alkaline Phosphatase 118 40 - 150 U/L    AST 23 10 - 40 U/L    ALT 11 10 - 44 U/L    eGFR 34 (A) >60 mL/min/1.73 m^2    Anion Gap 13 8 - 16 mmol/L   BNP    Collection Time: 12/03/24 12:25 PM   Result Value Ref Range    BNP 1,873 (H) 0 - 99 pg/mL   Protime-INR    Collection Time: 12/03/24 12:25 PM   Result Value Ref Range    Prothrombin Time 11.9 9.0 - 12.5 sec    INR 1.1 0.8 - 1.2   APTT    Collection Time: 12/03/24  12:25 PM   Result Value Ref Range    aPTT 22.6 21.0 - 32.0 sec   Lactic Acid, Plasma    Collection Time: 12/03/24  6:02 PM   Result Value Ref Range    Lactate (Lactic Acid) 1.5 0.5 - 2.2 mmol/L   Lactic Acid, Plasma    Collection Time: 12/03/24  9:32 PM   Result Value Ref Range    Lactate (Lactic Acid) 1.8 0.5 - 2.2 mmol/L   Magnesium    Collection Time: 12/04/24  5:15 AM   Result Value Ref Range    Magnesium 2.2 1.6 - 2.6 mg/dL   Phosphorus    Collection Time: 12/04/24  5:15 AM   Result Value Ref Range    Phosphorus 4.1 2.7 - 4.5 mg/dL   Comprehensive Metabolic Panel (CMP)    Collection Time: 12/04/24  5:15 AM   Result Value Ref Range    Sodium 132 (L) 136 - 145 mmol/L    Potassium 4.4 3.5 - 5.1 mmol/L    Chloride 103 95 - 110 mmol/L    CO2 20 (L) 23 - 29 mmol/L    Glucose 145 (H) 70 - 110 mg/dL    BUN 47 (H) 8 - 23 mg/dL    Creatinine 1.5 (H) 0.5 - 1.4 mg/dL    Calcium 8.7 8.7 - 10.5 mg/dL    Total Protein 5.8 (L) 6.0 - 8.4 g/dL    Albumin 2.5 (L) 3.5 - 5.2 g/dL    Total Bilirubin 0.6 0.1 - 1.0 mg/dL    Alkaline Phosphatase 107 40 - 150 U/L    AST 16 10 - 40 U/L    ALT 9 (L) 10 - 44 U/L    eGFR 34 (A) >60 mL/min/1.73 m^2    Anion Gap 9 8 - 16 mmol/L   CBC with Automated Differential    Collection Time: 12/04/24  5:15 AM   Result Value Ref Range    WBC 10.70 3.90 - 12.70 K/uL    RBC 4.04 4.00 - 5.40 M/uL    Hemoglobin 10.7 (L) 12.0 - 16.0 g/dL    Hematocrit 35.2 (L) 37.0 - 48.5 %    MCV 87 82 - 98 fL    MCH 26.5 (L) 27.0 - 31.0 pg    MCHC 30.4 (L) 32.0 - 36.0 g/dL    RDW 15.2 (H) 11.5 - 14.5 %    Platelets 143 (L) 150 - 450 K/uL    MPV 10.9 9.2 - 12.9 fL    Immature Granulocytes 0.7 (H) 0.0 - 0.5 %    Gran # (ANC) 9.6 (H) 1.8 - 7.7 K/uL    Immature Grans (Abs) 0.08 (H) 0.00 - 0.04 K/uL    Lymph # 0.3 (L) 1.0 - 4.8 K/uL    Mono # 0.7 0.3 - 1.0 K/uL    Eos # 0.0 0.0 - 0.5 K/uL    Baso # 0.03 0.00 - 0.20 K/uL    nRBC 0 0 /100 WBC    Gran % 89.4 (H) 38.0 - 73.0 %    Lymph % 2.6 (L) 18.0 - 48.0 %    Mono % 6.7 4.0 -  15.0 %    Eosinophil % 0.3 0.0 - 8.0 %    Basophil % 0.3 0.0 - 1.9 %    Differential Method Automated    Urinalysis, Reflex to Urine Culture Urine, Clean Catch    Collection Time: 12/04/24  6:18 AM    Specimen: Urine   Result Value Ref Range    Specimen UA Urine, Clean Catch     Color, UA Yellow Yellow, Straw, Lexi    Appearance, UA Clear Clear    pH, UA 6.0 5.0 - 8.0    Specific Gravity, UA >1.030 (A) 1.005 - 1.030    Protein, UA 1+ (A) Negative    Glucose, UA Negative Negative    Ketones, UA Negative Negative    Bilirubin (UA) Negative Negative    Occult Blood UA Negative Negative    Nitrite, UA Negative Negative    Urobilinogen, UA 2.0-3.0 (A) <2.0 EU/dL    Leukocytes, UA Negative Negative   Urinalysis Microscopic    Collection Time: 12/04/24  6:18 AM   Result Value Ref Range    RBC, UA 0 0 - 4 /hpf    WBC, UA 2 0 - 5 /hpf    Bacteria None None-Occ /hpf    Hyaline Casts, UA 1 0-1/lpf /lpf    Microscopic Comment SEE COMMENT    Legionella antigen, urine    Collection Time: 12/04/24  6:18 AM   Result Value Ref Range    L. pneumo. Ur Antigen Negative Negative   POCT glucose    Collection Time: 12/04/24  7:29 PM   Result Value Ref Range    POCT Glucose 182 (H) 70 - 110 mg/dL   ISTAT PROCEDURE    Collection Time: 12/04/24  7:44 PM   Result Value Ref Range    POC PH 7.237 (LL) 7.35 - 7.45    POC PCO2 41.5 35 - 45 mmHg    POC PO2 212 (H) 80 - 100 mmHg    POC HCO3 17.7 (L) 24 - 28 mmol/L    POC BE -10 (L) -2 to 2 mmol/L    POC SATURATED O2 100 95 - 100 %    POC Glucose 211 (H) 70 - 110 mg/dL    POC Sodium 131 (L) 136 - 145 mmol/L    POC Potassium 4.9 3.5 - 5.1 mmol/L    POC Ionized Calcium 1.27 1.06 - 1.42 mmol/L    POC Hematocrit 32 (L) 36 - 54 %PCV    Sample ARTERIAL     Site LR     Allens Test Pass     DelSys NRB    Procalcitonin    Collection Time: 12/04/24  8:26 PM   Result Value Ref Range    Procalcitonin 0.72 (H) <0.25 ng/mL   Lactic Acid, Plasma    Collection Time: 12/04/24  8:26 PM   Result Value Ref Range     Lactate (Lactic Acid) 2.2 0.5 - 2.2 mmol/L   Comprehensive Metabolic Panel    Collection Time: 12/04/24  8:26 PM   Result Value Ref Range    Sodium 130 (L) 136 - 145 mmol/L    Potassium 4.8 3.5 - 5.1 mmol/L    Chloride 104 95 - 110 mmol/L    CO2 14 (L) 23 - 29 mmol/L    Glucose 171 (H) 70 - 110 mg/dL    BUN 47 (H) 8 - 23 mg/dL    Creatinine 1.3 0.5 - 1.4 mg/dL    Calcium 8.5 (L) 8.7 - 10.5 mg/dL    Total Protein 6.0 6.0 - 8.4 g/dL    Albumin 2.6 (L) 3.5 - 5.2 g/dL    Total Bilirubin 0.6 0.1 - 1.0 mg/dL    Alkaline Phosphatase 110 40 - 150 U/L    AST 21 10 - 40 U/L    ALT 14 10 - 44 U/L    eGFR 40 (A) >60 mL/min/1.73 m^2    Anion Gap 12 8 - 16 mmol/L   Magnesium    Collection Time: 12/04/24  8:26 PM   Result Value Ref Range    Magnesium 2.1 1.6 - 2.6 mg/dL   CBC Auto Differential    Collection Time: 12/04/24  8:26 PM   Result Value Ref Range    WBC 11.09 3.90 - 12.70 K/uL    RBC 4.03 4.00 - 5.40 M/uL    Hemoglobin 10.7 (L) 12.0 - 16.0 g/dL    Hematocrit 34.3 (L) 37.0 - 48.5 %    MCV 85 82 - 98 fL    MCH 26.6 (L) 27.0 - 31.0 pg    MCHC 31.2 (L) 32.0 - 36.0 g/dL    RDW 15.2 (H) 11.5 - 14.5 %    Platelets 139 (L) 150 - 450 K/uL    MPV 10.8 9.2 - 12.9 fL    Immature Granulocytes 0.6 (H) 0.0 - 0.5 %    Gran # (ANC) 10.3 (H) 1.8 - 7.7 K/uL    Immature Grans (Abs) 0.07 (H) 0.00 - 0.04 K/uL    Lymph # 0.2 (L) 1.0 - 4.8 K/uL    Mono # 0.5 0.3 - 1.0 K/uL    Eos # 0.0 0.0 - 0.5 K/uL    Baso # 0.03 0.00 - 0.20 K/uL    nRBC 0 0 /100 WBC    Gran % 92.7 (H) 38.0 - 73.0 %    Lymph % 1.9 (L) 18.0 - 48.0 %    Mono % 4.1 4.0 - 15.0 %    Eosinophil % 0.4 0.0 - 8.0 %    Basophil % 0.3 0.0 - 1.9 %    Differential Method Automated    Sodium, urine, random    Collection Time: 12/04/24  8:28 PM   Result Value Ref Range    Sodium, Urine 53 20 - 250 mmol/L   Creatinine, urine, random    Collection Time: 12/04/24  8:28 PM   Result Value Ref Range    Creatinine, Urine 86.0 15.0 - 325.0 mg/dL   Microalbumin/creatinine urine ratio    Collection  Time: 12/04/24  8:28 PM   Result Value Ref Range    Microalbumin, Urine 35.0 ug/mL    Creatinine, Urine 86.0 15.0 - 325.0 mg/dL    Microalb/Creat Ratio 40.7 (H) 0.0 - 30.0 ug/mg   Culture, MRSA    Collection Time: 12/04/24  8:29 PM    Specimen: Nares, Left; MRSA source   Result Value Ref Range    MRSA Surveillance Screen No MRSA isolated    Respiratory Infection Panel (PCR), Nasopharyngeal    Collection Time: 12/04/24  8:31 PM    Specimen: Nasopharyngeal Swab   Result Value Ref Range    Respiratory Infection Panel Source NP swab Not Detected    Adenovirus Not Detected Not Detected    Coronavirus 229E, Common Cold Virus Not Detected Not Detected    Coronavirus HKU1, Common Cold Virus Not Detected Not Detected    Coronavirus NL63, Common Cold Virus Not Detected Not Detected    Coronavirus OC43, Common Cold Virus Not Detected Not Detected    SARS-CoV2 (COVID-19) Qualitative PCR Not Detected Not Detected    Human Metapneumovirus Not Detected Not Detected    Human Rhinovirus/Enterovirus Not Detected Not Detected    Influenza A (subtypes H1, H1-2009,H3) Not Detected Not Detected    Influenza B Not Detected Not Detected    Parainfluenza Virus 1 Not Detected Not Detected    Parainfluenza Virus 2 Not Detected Not Detected    Parainfluenza Virus 3 Not Detected Not Detected    Parainfluenza Virus 4 Not Detected Not Detected    Respiratory Syncytial Virus Not Detected Not Detected    Bordetella Parapertussis ([id number]) Not Detected Not Detected    Bordetella pertussis (ptxP) Not Detected Not Detected    Chlamydia pneumoniae Not Detected Not Detected    Mycoplasma pneumoniae Not Detected Not Detected   Blood culture    Collection Time: 12/04/24  9:47 PM    Specimen: Blood   Result Value Ref Range    Blood Culture, Routine Gram stain patricia bottle: Gram positive cocci     Blood Culture, Routine       Results called to and read back by: UVALDO Gregg. 12/07/2024  05:18    Blood Culture, Routine       Gram stain aer bottle: Gram positive  cocci in chains resembling Strep    Blood Culture, Routine 12/07/2024  15:12     Blood Culture, Routine (A)      ENTEROCOCCUS FAECALIS  For susceptibility see order #[id number]     Blood culture    Collection Time: 12/04/24  9:47 PM    Specimen: Blood   Result Value Ref Range    Blood Culture, Routine Gram stain patricia bottle: Gram positive cocci     Blood Culture, Routine       Results called to and read back by: UVALDO Gregg. 12/07/2024  05:18    Blood Culture, Routine Gram stain aer bottle: Gram positive cocci     Blood Culture, Routine ENTEROCOCCUS FAECALIS (A)        Susceptibility    Enterococcus faecalis - CULTURE, BLOOD     Ampicillin <=2 Sensitive mcg/mL     Gentamicin Synergy Screen <=500 Sensitive mcg/mL     Vancomycin 1 Sensitive mcg/mL   Rapid Organism ID by PCR (from Blood culture)    Collection Time: 12/04/24  9:47 PM   Result Value Ref Range    Enterococcus faecalis Detected (A) Not Detected    Enterococcus faecium Not Detected Not Detected    Listeria monocytogenes Not Detected Not Detected    Staphylococcus spp. Not Detected Not Detected    Staphylococcus aureus Not Detected Not Detected    Staphylococcus epidermidis Not Detected Not Detected    Staphylococcus lugdunensis Not Detected Not Detected    Streptococcus species Not Detected Not Detected    Streptococcus agalactiae Not Detected Not Detected    Streptococcus pneumoniae Not Detected Not Detected    Streptococcus pyogenes Not Detected Not Detected    Acinetobacter calcoaceticus/baumannii complex Not Detected Not Detected    Bacteroides fragilis Not Detected Not Detected    Enterobacterales Not Detected Not Detected    Enterobacter cloacae complex Not Detected Not Detected    Escherichia coli Not Detected Not Detected    Klebsiella aerogenes Not Detected Not Detected    Klebsiella oxytoca Not Detected Not Detected    Klebsiella pneumoniae group Not Detected Not Detected    Proteus Not Detected Not Detected    Salmonella sp Not Detected Not  Detected    Serratia marcescens Not Detected Not Detected    Haemophilus influenzae Not Detected Not Detected    Neisseria meningtidis Not Detected Not Detected    Pseudomonas aeruginosa Not Detected Not Detected    Stenotrophomonas maltophilia Not Detected Not Detected    Candida albicans Not Detected Not Detected    Candida auris Not Detected Not Detected    Candida glabrata Not Detected Not Detected    Candida krusei Not Detected Not Detected    Candida parapsilosis Not Detected Not Detected    Candida tropicalis Not Detected Not Detected    Cryptococcus neoformans/gattii Not Detected Not Detected    CTX-M (ESBL ) Test Not Applicable Not Detected    IMP (Carbapenem resistant) Test Not Applicable Not Detected    KPC resistance gene (Carbapenem resistant) Test Not Applicable Not Detected    mcr-1  Test Not Applicable Not Detected    mec A/C  Test Not Applicable Not Detected    mec A/C and MREJ (MRSA) gene Test Not Applicable Not Detected    NDM (Carbapenem resistant) Test Not Applicable Not Detected    OXA-48-like (Carbapenem resistant) Test Not Applicable Not Detected    van A/B (VRE gene) Not Detected Not Detected    VIM (Carbapenem resistant) Test Not Applicable Not Detected   EKG 12-lead    Collection Time: 12/04/24 10:15 PM   Result Value Ref Range    QRS Duration 104 ms    [facility] QTC Calculation 416 ms   Magnesium    Collection Time: 12/05/24  3:18 AM   Result Value Ref Range    Magnesium 2.2 1.6 - 2.6 mg/dL   Phosphorus    Collection Time: 12/05/24  3:18 AM   Result Value Ref Range    Phosphorus 4.3 2.7 - 4.5 mg/dL   Comprehensive Metabolic Panel (CMP)    Collection Time: 12/05/24  3:18 AM   Result Value Ref Range    Sodium 133 (L) 136 - 145 mmol/L    Potassium 4.8 3.5 - 5.1 mmol/L    Chloride 107 95 - 110 mmol/L    CO2 17 (L) 23 - 29 mmol/L    Glucose 174 (H) 70 - 110 mg/dL    BUN 48 (H) 8 - 23 mg/dL    Creatinine 1.3 0.5 - 1.4 mg/dL    Calcium 8.4 (L) 8.7 - 10.5 mg/dL    Total Protein 5.6 (L) 6.0 -  8.4 g/dL    Albumin 2.4 (L) 3.5 - 5.2 g/dL    Total Bilirubin 0.5 0.1 - 1.0 mg/dL    Alkaline Phosphatase 106 40 - 150 U/L    AST 16 10 - 40 U/L    ALT 14 10 - 44 U/L    eGFR 40 (A) >60 mL/min/1.73 m^2    Anion Gap 9 8 - 16 mmol/L   CBC with Automated Differential    Collection Time: 12/05/24  3:18 AM   Result Value Ref Range    WBC 9.64 3.90 - 12.70 K/uL    RBC 3.82 (L) 4.00 - 5.40 M/uL    Hemoglobin 10.2 (L) 12.0 - 16.0 g/dL    Hematocrit 32.3 (L) 37.0 - 48.5 %    MCV 85 82 - 98 fL    MCH 26.7 (L) 27.0 - 31.0 pg    MCHC 31.6 (L) 32.0 - 36.0 g/dL    RDW 15.2 (H) 11.5 - 14.5 %    Platelets 130 (L) 150 - 450 K/uL    MPV 10.5 9.2 - 12.9 fL    Immature Granulocytes 0.5 0.0 - 0.5 %    Gran # (ANC) 9.2 (H) 1.8 - 7.7 K/uL    Immature Grans (Abs) 0.05 (H) 0.00 - 0.04 K/uL    Lymph # 0.1 (L) 1.0 - 4.8 K/uL    Mono # 0.2 (L) 0.3 - 1.0 K/uL    Eos # 0.0 0.0 - 0.5 K/uL    Baso # 0.01 0.00 - 0.20 K/uL    nRBC 0 0 /100 WBC    Gran % 95.6 (H) 38.0 - 73.0 %    Lymph % 1.2 (L) 18.0 - 48.0 %    Mono % 2.2 (L) 4.0 - 15.0 %    Eosinophil % 0.4 0.0 - 8.0 %    Basophil % 0.1 0.0 - 1.9 %    Differential Method Automated    Magnesium    Collection Time: 12/06/24  7:08 AM   Result Value Ref Range    Magnesium 2.4 1.6 - 2.6 mg/dL   Phosphorus    Collection Time: 12/06/24  7:08 AM   Result Value Ref Range    Phosphorus 3.0 2.7 - 4.5 mg/dL   Comprehensive Metabolic Panel (CMP)    Collection Time: 12/06/24  7:08 AM   Result Value Ref Range    Sodium 134 (L) 136 - 145 mmol/L    Potassium 4.4 3.5 - 5.1 mmol/L    Chloride 105 95 - 110 mmol/L    CO2 21 (L) 23 - 29 mmol/L    Glucose 136 (H) 70 - 110 mg/dL    BUN 56 (H) 8 - 23 mg/dL    Creatinine 1.4 0.5 - 1.4 mg/dL    Calcium 8.7 8.7 - 10.5 mg/dL    Total Protein 5.4 (L) 6.0 - 8.4 g/dL    Albumin 2.4 (L) 3.5 - 5.2 g/dL    Total Bilirubin 0.5 0.1 - 1.0 mg/dL    Alkaline Phosphatase 90 40 - 150 U/L    AST 18 10 - 40 U/L    ALT 17 10 - 44 U/L    eGFR 37 (A) >60 mL/min/1.73 m^2    Anion Gap 8 8 - 16  mmol/L   CBC with Automated Differential    Collection Time: 12/06/24  7:08 AM   Result Value Ref Range    WBC 10.72 3.90 - 12.70 K/uL    RBC 3.59 (L) 4.00 - 5.40 M/uL    Hemoglobin 9.5 (L) 12.0 - 16.0 g/dL    Hematocrit 30.0 (L) 37.0 - 48.5 %    MCV 84 82 - 98 fL    MCH 26.5 (L) 27.0 - 31.0 pg    MCHC 31.7 (L) 32.0 - 36.0 g/dL    RDW 15.0 (H) 11.5 - 14.5 %    Platelets 147 (L) 150 - 450 K/uL    MPV 10.6 9.2 - 12.9 fL    Immature Granulocytes 0.8 (H) 0.0 - 0.5 %    Gran # (ANC) 9.7 (H) 1.8 - 7.7 K/uL    Immature Grans (Abs) 0.09 (H) 0.00 - 0.04 K/uL    Lymph # 0.3 (L) 1.0 - 4.8 K/uL    Mono # 0.6 0.3 - 1.0 K/uL    Eos # 0.0 0.0 - 0.5 K/uL    Baso # 0.02 0.00 - 0.20 K/uL    nRBC 0 0 /100 WBC    Gran % 90.5 (H) 38.0 - 73.0 %    Lymph % 3.1 (L) 18.0 - 48.0 %    Mono % 5.4 4.0 - 15.0 %    Eosinophil % 0.0 0.0 - 8.0 %    Basophil % 0.2 0.0 - 1.9 %    Differential Method Automated    CBC Auto Differential    Collection Time: 12/07/24  5:50 AM   Result Value Ref Range    WBC 9.61 3.90 - 12.70 K/uL    RBC 4.05 4.00 - 5.40 M/uL    Hemoglobin 10.8 (L) 12.0 - 16.0 g/dL    Hematocrit 33.1 (L) 37.0 - 48.5 %    MCV 82 82 - 98 fL    MCH 26.7 (L) 27.0 - 31.0 pg    MCHC 32.6 32.0 - 36.0 g/dL    RDW 15.0 (H) 11.5 - 14.5 %    Platelets 174 150 - 450 K/uL    MPV 10.3 9.2 - 12.9 fL    Immature Granulocytes 1.8 (H) 0.0 - 0.5 %    Gran # (ANC) 8.3 (H) 1.8 - 7.7 K/uL    Immature Grans (Abs) 0.17 (H) 0.00 - 0.04 K/uL    Lymph # 0.4 (L) 1.0 - 4.8 K/uL    Mono # 0.6 0.3 - 1.0 K/uL    Eos # 0.1 0.0 - 0.5 K/uL    Baso # 0.02 0.00 - 0.20 K/uL    nRBC 0 0 /100 WBC    Gran % 86.8 (H) 38.0 - 73.0 %    Lymph % 3.9 (L) 18.0 - 48.0 %    Mono % 6.2 4.0 - 15.0 %    Eosinophil % 1.1 0.0 - 8.0 %    Basophil % 0.2 0.0 - 1.9 %    Differential Method Automated    Basic Metabolic Panel    Collection Time: 12/07/24  5:50 AM   Result Value Ref Range    Sodium 134 (L) 136 - 145 mmol/L    Potassium 4.5 3.5 - 5.1 mmol/L    Chloride 106 95 - 110 mmol/L    CO2 20  (L) 23 - 29 mmol/L    Glucose 118 (H) 70 - 110 mg/dL    BUN 44 (H) 8 - 23 mg/dL    Creatinine 1.1 0.5 - 1.4 mg/dL    Calcium 8.9 8.7 - 10.5 mg/dL    Anion Gap 8 8 - 16 mmol/L    eGFR 49 (A) >60 mL/min/1.73 m^2   Blood culture    Collection Time: 12/07/24  8:54 AM    Specimen: Peripheral, Hand, Left; Blood   Result Value Ref Range    Blood Culture, Routine No Growth to date     Blood Culture, Routine No Growth to date     Blood Culture, Routine No Growth to date    Blood culture    Collection Time: 12/07/24  8:56 AM    Specimen: Peripheral, Hand, Right; Blood   Result Value Ref Range    Blood Culture, Routine No Growth to date     Blood Culture, Routine No Growth to date     Blood Culture, Routine No Growth to date    Echo    Collection Time: 12/07/24  4:02 PM   Result Value Ref Range    BSA 1.9 m2    LVIDd 4.3 3.5 - 6.0 cm    LV Systolic Volume 20.33 mL    LV Systolic Volume Index 11.0 mL/m2    LVIDs 2.4 2.1 - 4.0 cm    LV Diastolic Volume 84.33 mL    LV Diastolic Volume Index 45.83 mL/m2    Left Ventricular End Systolic Volume by Teichholz Method 20.33 mL    Left Ventricular End Diastolic Volume by Teichholz Method 84.33 mL    IVS 1.5 (A) 0.6 - 1.1 cm    FS 44.2 (A) 28 - 44 %    Left Ventricle Relative Wall Thickness 0.74 cm    PW 1.6 (A) 0.6 - 1.1 cm    LV mass 271.6 g    LV Mass Index 147.6 g/m2    MV Peak E August 0.81 m/s    TDI LATERAL 0.08 m/s    TDI SEPTAL 0.06 m/s    E/E' ratio 11.57 m/s    MV Peak A August 0.93 m/s    TR Max August 3.74 m/s    E/A ratio 0.87     IVRT 72.31 msec    E wave deceleration time 191.83 msec    LV SEPTAL E/E' RATIO 13.50 m/s    LV LATERAL E/E' RATIO 10.13 m/s    LVOT peak august 1.1 m/s    Left Ventricular Outflow Tract Mean Velocity 0.92 cm/s    Left Ventricular Outflow Tract Mean Gradient 3.53 mmHg    RVOT peak VTI 14.3 cm    TAPSE 1.77 cm    LA size 4.76 cm    Left Atrium Minor Axis 5.64 cm    Left Atrium Major Axis 5.57 cm    RA Major Axis 4.73 cm    AV regurgitation pressure 1/2 time  [id number] ms    AR Max August 2.03 m/s    AV mean gradient 9.4 mmHg    AV peak gradient 14.4 mmHg    Ao peak august 1.9 m/s    Ao VTI 30.3 cm    LVOT peak VTI 26.3 cm    AV Velocity Ratio 0.58     AV index (prosthetic) 0.87     Mr max august 4.48 m/s    MV stenosis pressure 1/2 time 55.63 ms    MV valve area p 1/2 method 3.95 cm2    TV mean gradient 52 mmHg    Triscuspid Valve Regurgitation Peak Gradient 56 mmHg    PV mean gradient 4 mmHg    RVOT peak august 1.05 m/s    IVC diameter 2.31 cm    Mean e' 0.07 m/s    ZLVIDS -2.15     ZLVIDD -1.72     RVDD 3.38 cm    LIAN 44.4 mL/m2    LA Vol 81.64 cm3    LA WIDTH 3.6 cm    RA Width 3.5 cm    TV resting pulmonary artery pressure 71 mmHg    RV TB RVSP 19 mmHg    Est. RA pres 15 mmHg        Imaging Results              X-Ray Chest AP Portable (Final result)  Result time 12/03/24 13:08:01      Final result by Cam Obrien MD (12/03/24 13:08:01)                   Impression:      Worsening left-sided airspace opacity in comparison to the prior radiograph.  Underlying mass not excluded.      Electronically signed by: Cam Obrien  Date:    12/03/2024  Time:    13:08               Narrative:    EXAMINATION:  XR CHEST AP PORTABLE    CLINICAL HISTORY:  Low back pain, unspecified    TECHNIQUE:  Single frontal view of the chest was performed.    COMPARISON:  Multiple    FINDINGS:  Suspect worsening left-sided pulmonary opacity in comparison to the prior radiograph.  Underlying mass not excluded.  No pleural fluid or pneumothorax.    The cardiac silhouette is enlarged.  The hilar and mediastinal contours are unremarkable.    Bones are intact.                                       Pending Diagnostic Studies:       None           Medications:       Medication List        START taking these medications      amoxicillin 500 MG Tab  Commonly known as: AMOXIL  Take 2 tablets (1,000 mg total) by mouth 3 (three) times daily. for 10 days            CHANGE how you take these medications       sertraline 100 MG tablet  Commonly known as: ZOLOFT  TAKE 1 TABLET BY MOUTH EVERY DAY  What changed: when to take this            CONTINUE taking these medications      acetaminophen 500 MG tablet  Commonly known as: TYLENOL     albuterol-ipratropium 2.5 mg-0.5 mg/3 mL nebulizer solution  Commonly known as: DUO-NEB  Take 3 mLs by nebulization every 6 (six) hours as needed for Wheezing. Rescue     clobetasol 0.05% 0.05 % Oint  Commonly known as: TEMOVATE  AAA on the scalp BID x 4-6 weeks then D/C. Restart PRN for flares.     fluocinolone 0.01 % external oil  Commonly known as: DERMA-SMOOTHE  Part the hair at night, apply to flared areas on the scalp QHS PRN for itch.     fluticasone-umeclidin-vilanter 100-62.5-25 mcg Dsdv  Commonly known as: TRELEGY ELLIPTA  Inhale 1 puff into the lungs every morning.     gabapentin 100 MG capsule  Commonly known as: NEURONTIN  Take 1 capsule (100 mg total) by mouth 3 (three) times daily.     losartan 100 MG tablet  Commonly known as: COZAAR  TAKE 1 TABLET(100 MG) BY MOUTH EVERY DAY     oxyCODONE-acetaminophen  mg per tablet  Commonly known as: PERCOCET  Take 1 tablet by mouth every 4 (four) hours as needed for Pain.     permethrin 5 % cream  Commonly known as: ELIMITE     pimecrolimus 1 % cream  Commonly known as: ELIDEL  Apply topically 2 (two) times daily.     simvastatin 80 MG tablet  Commonly known as: ZOCOR  TAKE 1 TABLET(80 MG) BY MOUTH EVERY EVENING               Where to Get Your Medications        These medications were sent to [facility] [address], [place] [address]      Hours: Mon-Fri, 8a-5:30p Phone: [phone]   amoxicillin 500 MG Tab          Indwelling Lines/Drains at time of discharge:   Lines/Drains/Airways       Peripherally Inserted Central Catheter Line  Duration             PICC Double Lumen 12/08/24 0001 left brachial 2 days              Drain  Duration                  Urethral Catheter 12/04/24 2012  Temperature probe 16 Fr. 5 days                    Time spent on the discharge of patient: 92 minutes         Romulo Jones MD  Department of Hospital Medicine  '[facility]

## 2024-12-10 NOTE — PLAN OF CARE
Discussed poc with pt, pt verbalized understanding    Purposeful rounding every 2hours    VS wnl  Cardiac monitoring in use, pt is NSR, tele monitor # [id number]  Blood glucose monitoring   Fall precautions in place, remains injury free  Pt denies c/o ____  Pain and nausea under control with PRN meds    IVFs  Accurate I&Os  Abx given as prescribed  Bed locked at lowest position  Call light within reach    Chart check complete  Will cont with POC

## 2024-12-10 NOTE — ASSESSMENT & PLAN NOTE
Malnutrition Type:  Context: acute on chronic illness  Level: severe    Related to (etiology):   Physiological causes increasing nutrient needs d/t illness    Signs and Symptoms (as evidenced by):   Inc loss of subcutaneous fat  Inc muscle loss  Change in functional indicators ( strength)  Unable or unwilling to eat sufficient energy/protein to maintain a healthy weight  Food avoidance and/or lack of interest in food  Pneumonia    Malnutrition Characteristic Summary:  Energy Intake (Malnutrition): less than or equal to 50% for greater than or equal to 5 days  Subcutaneous Fat (Malnutrition): moderate depletion  Muscle Mass (Malnutrition): moderate depletion  Fluid Accumulation (Malnutrition):  (1+ trace)  Hand  Strength, Left (Malnutrition): decreased  Hand  Strength, Right (Malnutrition): decreased    Interventions/Recommendations (treatment strategy):  1. General healthful diet  2. Commercial beverage medical food supplement therapy  3. Feeding assistance management   4. Collaboration by nutrition professional with other providers    Nutrition Diagnosis Status:   New

## 2024-12-10 NOTE — PLAN OF CARE
Problem: Adult Inpatient Plan of Care  Goal: Plan of Care Review  Outcome: Progressing  Goal: Patient-Specific Goal (Individualized)  Outcome: Progressing  Goal: Absence of Hospital-Acquired Illness or Injury  Outcome: Progressing  Goal: Optimal Comfort and Wellbeing  Outcome: Progressing  Goal: Readiness for Transition of Care  Outcome: Progressing     Problem: Pneumonia  Goal: Fluid Balance  Outcome: Progressing  Goal: Resolution of Infection Signs and Symptoms  Outcome: Progressing  Goal: Effective Oxygenation and Ventilation  Outcome: Progressing     Problem: Fall Injury Risk  Goal: Absence of Fall and Fall-Related Injury  Outcome: Progressing     Problem: Skin Injury Risk Increased  Goal: Skin Health and Integrity  Outcome: Progressing     Problem: Acute Kidney Injury/Impairment  Goal: Fluid and Electrolyte Balance  Outcome: Progressing  Goal: Improved Oral Intake  Outcome: Progressing  Goal: Effective Renal Function  Outcome: Progressing     Problem: Infection  Goal: Absence of Infection Signs and Symptoms  Outcome: Progressing     Problem: Pain Acute  Goal: Optimal Pain Control and Function  Outcome: Progressing     Problem: Pain Chronic (Persistent)  Goal: Optimal Pain Control and Function  Outcome: Progressing     Problem: Coping Ineffective  Goal: Effective Coping  Outcome: Progressing

## 2024-12-10 NOTE — PT/OT/SLP PROGRESS
Occupational Therapy      Patient Name:  Katrina Duran   MRN:  [id number]    Patient not seen today secondary to  (discontinue orders from MD due to pt will recieve hospice care). Pt discharged from skilled O.T..    12/10/2024  Aranza Umanzor, OT  [id number]

## 2024-12-10 NOTE — PROGRESS NOTES
[facility]  Hematology/Oncology  Progress Note    Patient Name: Katrina Duran  Admission Date: 12/3/2024  Hospital Length of Stay: 6 days  Code Status: DNR     Subjective:     HPI:    HPI:Katrina Duran is a 84 y.o. female with PMHx of HTN, COPD, Lung Ca and CAD s/p CABG in 1999 and stent in 2006, brought to ER by EMS for severe lower back pain that radiates to her right leg since July of this year. Pt reports she is unable to stand and ambulate, like her legs are giving out upon standing and experiences excruciating pain when getting out of bed     Pt has hx lung cancer and received radiation in November of 2023.   CTA CAP 12/1/2204: Interval increase in angular opacity within the lingula, likely a combination of partial collapse with possible underlying mass. Interval increase in size of semi-solid right lower lobe nodule with increase central solid component, currently measuring 4.2 cm. Interval development of a 7 mm spiculated nodule in the right lung apex. Interval development of at least 2 hepatic masses measuring up to 3.9 cm. These changes must be considered neoplastic in origin until proven otherwise. A repeat PET CT scan would be helpful.      CT L/S on 12/1-  subtle nondisplaced fracture involving the inferior, posterior and left lateral corner of the L3 vertebral body. Marked degenerative disc changes and degenerative facet arthropathy noted diffusely with marked convex left curvature of the lumbar spine.  Multilevel, multifactorial moderate to severe spinal canal stenosis and nerve root foramen narrowing, most significantly involving the L3/L4 disc level.      MRI lumbar spine: 12/6/2024: Metastasis involving the L3, L4, L5 and S1 vertebra.  There also may be a metastasis involving the left iliac bone at the SI joint.     MRI cervical spine: No suspicious bone lesions suggest bone metastasis.     MRI thoracic spine 12/6/2024: Abnormal bone lesions involving T 10, T11 and T12 concerning for bone  metastasis.               Medications:  Continuous Infusions:       Interval History:  Patient is very weak daughter at bedside    Oncology Treatment Plan:   [No matching plan found]    Medications:  Continuous Infusions:  Scheduled Meds:   albuterol-ipratropium  3 mL Nebulization Q6H WAKE    ampicillin-sulbactam  3 g Intravenous Q12H    arformoteroL  15 mcg Nebulization BID    atorvastatin  40 mg Oral Daily    budesonide  0.5 mg Nebulization Q12H    dexAMETHasone  4 mg Intravenous Daily    gabapentin  100 mg Oral TID    heparin (porcine)  5,000 Units Subcutaneous Q8H    LIDOcaine  1 patch Transdermal Q24H    morphine  2 mg Intravenous Once    mupirocin   Nasal BID    pantoprazole  40 mg Oral Daily    polyethylene glycol  17 g Oral Daily    senna-docusate 8.6-50 mg  1 tablet Oral Daily    sertraline  100 mg Oral QHS    sodium chloride 0.9%  10 mL Intravenous Q8H     PRN Meds:  Current Facility-Administered Medications:     acetaminophen, 650 mg, Oral, Q4H PRN    dextrose 10%, 12.5 g, Intravenous, PRN    dextrose 10%, 25 g, Intravenous, PRN    HYDROmorphone, 1 mg, Intravenous, Q4H PRN    influenza (adjuvanted), 0.5 mL, Intramuscular, Prior to discharge    melatonin, 6 mg, Oral, Nightly PRN    morphine, 0.5 mg, Intravenous, Q6H PRN    naloxone, 0.02 mg, Intravenous, PRN    ondansetron, 8 mg, Oral, Q8H PRN    ondansetron, 4 mg, Intravenous, Q6H PRN    oxyCODONE-acetaminophen, 1 tablet, Oral, Q4H PRN    polyethylene glycol, 17 g, Oral, Daily PRN    promethazine, 25 mg, Oral, Q6H PRN    senna-docusate 8.6-50 mg, 1 tablet, Oral, BID PRN     Review of Systems   Constitutional:  Positive for fatigue. Negative for activity change, appetite change, chills, diaphoresis, fever and unexpected weight change.   HENT:  Negative for congestion, dental problem, drooling, ear discharge, ear pain, facial swelling, hearing loss, mouth sores, nosebleeds, postnasal drip, rhinorrhea, sinus pressure, sneezing, sore throat, tinnitus, trouble  swallowing and voice change.    Eyes:  Negative for photophobia, pain, discharge, redness, itching and visual disturbance.   Respiratory:  Negative for cough, choking, chest tightness, shortness of breath, wheezing and stridor.    Cardiovascular:  Negative for chest pain, palpitations and leg swelling.   Gastrointestinal:  Negative for abdominal distention, abdominal pain, anal bleeding, blood in stool, constipation, diarrhea, nausea, rectal pain and vomiting.   Endocrine: Negative for cold intolerance, heat intolerance, polydipsia, polyphagia and polyuria.   Genitourinary:  Negative for decreased urine volume, difficulty urinating, dyspareunia, dysuria, enuresis, flank pain, frequency, genital sores, hematuria, menstrual problem, pelvic pain, urgency, vaginal bleeding, vaginal discharge and vaginal pain.   Musculoskeletal:  Positive for arthralgias, gait problem and myalgias. Negative for back pain, joint swelling, neck pain and neck stiffness.   Skin:  Negative for color change, pallor and rash.   Allergic/Immunologic: Negative for environmental allergies, food allergies and immunocompromised state.   Neurological:  Positive for weakness. Negative for dizziness, tremors, seizures, syncope, facial asymmetry, speech difficulty, light-headedness, numbness and headaches.   Hematological:  Negative for adenopathy. Does not bruise/bleed easily.   Psychiatric/Behavioral:  Positive for dysphoric mood. Negative for agitation, behavioral problems, confusion, decreased concentration, hallucinations, self-injury, sleep disturbance and suicidal ideas. The patient is nervous/anxious. The patient is not hyperactive.      Objective:     Vital Signs (Most Recent):  Temp: 98.5 °F (36.9 °C) (12/10/24 0358)  Pulse: 69 (12/10/24 0742)  Resp: 16 (12/10/24 0632)  BP: (!) 147/67 (12/10/24 0358)  SpO2: 96 % (12/10/24 0358) Vital Signs (24h Range):  Temp:  [97.6 °F (36.4 °C)-98.5 °F (36.9 °C)] 98.5 °F (36.9 °C)  Pulse:  [69-81] 69  Resp:   "[16-18] 16  SpO2:  [89 %-98 %] 96 %  BP: (108-181)/(53-76) 147/67     Weight: 81.2 kg (179 lb)  Body mass index is 31.71 kg/m².  Body surface area is 1.9 meters squared.      Intake/Output Summary (Last 24 hours) at 12/10/2024 0778  Last data filed at 12/10/2024 0626  Gross per 24 hour   Intake 200 ml   Output 950 ml   Net -750 ml        Physical Exam  Constitutional:       Appearance: She is ill-appearing.   Neurological:      Motor: Weakness present.      Gait: Gait abnormal.          Significant Labs:   BMP: No results for input(s): "GLU", "NA", "K", "CL", "CO2", "BUN", "CREATININE", "CALCIUM", "MG" in the last 48 hours., CBC: No results for input(s): "WBC", "HGB", "HCT", "PLT" in the last 48 hours., CMP: No results for input(s): "NA", "K", "CL", "CO2", "GLU", "BUN", "CREATININE", "CALCIUM", "PROT", "ALBUMIN", "BILITOT", "ALKPHOS", "AST", "ALT", "ANIONGAP", "EGFRNONAA" in the last 48 hours.    Invalid input(s): "ESTGFAFRICA", Coagulation: No results for input(s): "PT", "INR", "APTT" in the last 48 hours., Haptoglobin: No results for input(s): "HAPTOGLOBIN" in the last 48 hours., Immunology: No results for input(s): "SPEP", "SHAHEEN", "RAJIV", "FREELAMBDALI" in the last 48 hours., LDH: No results for input(s): "LDHCSF", "BFSOURCE" in the last 48 hours., LFTs: No results for input(s): "ALT", "AST", "ALKPHOS", "BILITOT", "PROT", "ALBUMIN" in the last 48 hours., Reticulocytes: No results for input(s): "RETIC" in the last 48 hours., Tumor Markers: No results for input(s): "PSA", "CEA", "", "AFPTM", "[id number]", "" in the last 48 hours.    Invalid input(s): "ALGTM", Uric Acid No results for input(s): "URICACID" in the last 48 hours., and Urine Studies: No results for input(s): "COLORU", "APPEARANCEUA", "PHUR", "SPECGRAV", "PROTEINUA", "GLUCUA", "KETONESU", "BILIRUBINUA", "OCCULTUA", "NITRITE", "UROBILINOGEN", "LEUKOCYTESUR", "RBCUA", "WBCUA", "BACTERIA", "SQUAMEPITHEL", "HYALINECASTS" in the last 48 hours.    Invalid " "input(s): "MEENAKSHI"    Diagnostic Results:  I have reviewed all pertinent imaging results/findings within the past 24 hours.  Assessment/Plan:     Metastasis to bone   Will defer to Radiation therapy for painful bone metastasis for relief of discomfort and pain    Advanced care planning/counseling discussion   Agree entirely with do not resuscitate order and referral to hospice once palliative radiation is completed    Adenocarcinoma of left lung   Widespread metastatic non-small cell lung carcinoma.  Patient has disease in lung liver.  She does not wish to take systemic therapy but does wish palliative radiation therapy to areas that are painful and bone.  I had very long discussion with her and her daughter at bedside strongly recommend hospice care she lives in the [facility] and will defer to  which hospice is have inpatient units associated with them [facility]  12/10/2024.  Reviewed information with patient.  As daughter at bedside explain dying process to them.  They are currently scheduled to be enrolled in hospice at [facility] with in outpatient capabilities discussed lack of food intake and IV fluids that will be done at hospice.  In talked about dying process        Thank you for your consult. I will follow-up with patient. Please contact us if you have any additional questions.     Farshad Douglass MD  Hematology/Oncology  [facility]      "

## 2024-12-10 NOTE — SUBJECTIVE & OBJECTIVE
Interval History:  Patient is very weak daughter at bedside    Oncology Treatment Plan:   [No matching plan found]    Medications:  Continuous Infusions:  Scheduled Meds:   albuterol-ipratropium  3 mL Nebulization Q6H WAKE    ampicillin-sulbactam  3 g Intravenous Q12H    arformoteroL  15 mcg Nebulization BID    atorvastatin  40 mg Oral Daily    budesonide  0.5 mg Nebulization Q12H    dexAMETHasone  4 mg Intravenous Daily    gabapentin  100 mg Oral TID    heparin (porcine)  5,000 Units Subcutaneous Q8H    LIDOcaine  1 patch Transdermal Q24H    morphine  2 mg Intravenous Once    mupirocin   Nasal BID    pantoprazole  40 mg Oral Daily    polyethylene glycol  17 g Oral Daily    senna-docusate 8.6-50 mg  1 tablet Oral Daily    sertraline  100 mg Oral QHS    sodium chloride 0.9%  10 mL Intravenous Q8H     PRN Meds:  Current Facility-Administered Medications:     acetaminophen, 650 mg, Oral, Q4H PRN    dextrose 10%, 12.5 g, Intravenous, PRN    dextrose 10%, 25 g, Intravenous, PRN    HYDROmorphone, 1 mg, Intravenous, Q4H PRN    influenza (adjuvanted), 0.5 mL, Intramuscular, Prior to discharge    melatonin, 6 mg, Oral, Nightly PRN    morphine, 0.5 mg, Intravenous, Q6H PRN    naloxone, 0.02 mg, Intravenous, PRN    ondansetron, 8 mg, Oral, Q8H PRN    ondansetron, 4 mg, Intravenous, Q6H PRN    oxyCODONE-acetaminophen, 1 tablet, Oral, Q4H PRN    polyethylene glycol, 17 g, Oral, Daily PRN    promethazine, 25 mg, Oral, Q6H PRN    senna-docusate 8.6-50 mg, 1 tablet, Oral, BID PRN     Review of Systems   Constitutional:  Positive for fatigue. Negative for activity change, appetite change, chills, diaphoresis, fever and unexpected weight change.   HENT:  Negative for congestion, dental problem, drooling, ear discharge, ear pain, facial swelling, hearing loss, mouth sores, nosebleeds, postnasal drip, rhinorrhea, sinus pressure, sneezing, sore throat, tinnitus, trouble swallowing and voice change.    Eyes:  Negative for photophobia,  pain, discharge, redness, itching and visual disturbance.   Respiratory:  Negative for cough, choking, chest tightness, shortness of breath, wheezing and stridor.    Cardiovascular:  Negative for chest pain, palpitations and leg swelling.   Gastrointestinal:  Negative for abdominal distention, abdominal pain, anal bleeding, blood in stool, constipation, diarrhea, nausea, rectal pain and vomiting.   Endocrine: Negative for cold intolerance, heat intolerance, polydipsia, polyphagia and polyuria.   Genitourinary:  Negative for decreased urine volume, difficulty urinating, dyspareunia, dysuria, enuresis, flank pain, frequency, genital sores, hematuria, menstrual problem, pelvic pain, urgency, vaginal bleeding, vaginal discharge and vaginal pain.   Musculoskeletal:  Positive for arthralgias, gait problem and myalgias. Negative for back pain, joint swelling, neck pain and neck stiffness.   Skin:  Negative for color change, pallor and rash.   Allergic/Immunologic: Negative for environmental allergies, food allergies and immunocompromised state.   Neurological:  Positive for weakness. Negative for dizziness, tremors, seizures, syncope, facial asymmetry, speech difficulty, light-headedness, numbness and headaches.   Hematological:  Negative for adenopathy. Does not bruise/bleed easily.   Psychiatric/Behavioral:  Positive for dysphoric mood. Negative for agitation, behavioral problems, confusion, decreased concentration, hallucinations, self-injury, sleep disturbance and suicidal ideas. The patient is nervous/anxious. The patient is not hyperactive.      Objective:     Vital Signs (Most Recent):  Temp: 98.5 °F (36.9 °C) (12/10/24 0358)  Pulse: 69 (12/10/24 0742)  Resp: 16 (12/10/24 0632)  BP: (!) 147/67 (12/10/24 0358)  SpO2: 96 % (12/10/24 0358) Vital Signs (24h Range):  Temp:  [97.6 °F (36.4 °C)-98.5 °F (36.9 °C)] 98.5 °F (36.9 °C)  Pulse:  [69-81] 69  Resp:  [16-18] 16  SpO2:  [89 %-98 %] 96 %  BP: (108-181)/(53-76) 147/67  "    Weight: 81.2 kg (179 lb)  Body mass index is 31.71 kg/m².  Body surface area is 1.9 meters squared.      Intake/Output Summary (Last 24 hours) at 12/10/2024 0749  Last data filed at 12/10/2024 0626  Gross per 24 hour   Intake 200 ml   Output 950 ml   Net -750 ml        Physical Exam  Constitutional:       Appearance: She is ill-appearing.   Neurological:      Motor: Weakness present.      Gait: Gait abnormal.          Significant Labs:   BMP: No results for input(s): "GLU", "NA", "K", "CL", "CO2", "BUN", "CREATININE", "CALCIUM", "MG" in the last 48 hours., CBC: No results for input(s): "WBC", "HGB", "HCT", "PLT" in the last 48 hours., CMP: No results for input(s): "NA", "K", "CL", "CO2", "GLU", "BUN", "CREATININE", "CALCIUM", "PROT", "ALBUMIN", "BILITOT", "ALKPHOS", "AST", "ALT", "ANIONGAP", "EGFRNONAA" in the last 48 hours.    Invalid input(s): "ESTGFAFRICA", Coagulation: No results for input(s): "PT", "INR", "APTT" in the last 48 hours., Haptoglobin: No results for input(s): "HAPTOGLOBIN" in the last 48 hours., Immunology: No results for input(s): "SPEP", "SHAHEEN", "RAJIV", "FREELAMBDALI" in the last 48 hours., LDH: No results for input(s): "LDHCSF", "BFSOURCE" in the last 48 hours., LFTs: No results for input(s): "ALT", "AST", "ALKPHOS", "BILITOT", "PROT", "ALBUMIN" in the last 48 hours., Reticulocytes: No results for input(s): "RETIC" in the last 48 hours., Tumor Markers: No results for input(s): "PSA", "CEA", "", "AFPTM", "[id number]", "" in the last 48 hours.    Invalid input(s): "ALGTM", Uric Acid No results for input(s): "URICACID" in the last 48 hours., and Urine Studies: No results for input(s): "COLORU", "APPEARANCEUA", "PHUR", "SPECGRAV", "PROTEINUA", "GLUCUA", "KETONESU", "BILIRUBINUA", "OCCULTUA", "NITRITE", "UROBILINOGEN", "LEUKOCYTESUR", "RBCUA", "WBCUA", "BACTERIA", "SQUAMEPITHEL", "HYALINECASTS" in the last 48 hours.    Invalid input(s): "WRIGHTSUR"    Diagnostic Results:  I have reviewed all " pertinent imaging results/findings within the past 24 hours.

## 2024-12-10 NOTE — ASSESSMENT & PLAN NOTE
Hydromorphone, morphine, oxycodone PRN  Better sx mgmt overall with dexamethasone   Plans to admit to hospice upon discharge.

## 2024-12-10 NOTE — PT/OT/SLP PROGRESS
Physical Therapy      Patient Name:  Katrina Duran   MRN:  [id number]    Chart review performed, PT orders discontinued per MD due to transition to hospice care. ([id number])

## 2024-12-10 NOTE — ASSESSMENT & PLAN NOTE
Widespread metastatic non-small cell lung carcinoma.  Patient has disease in lung liver.  She does not wish to take systemic therapy but does wish palliative radiation therapy to areas that are painful and bone.  I had very long discussion with her and her daughter at bedside strongly recommend hospice care she lives in the [facility] and will defer to  which hospice is have inpatient units associated with them [facility]  12/10/2024.  Reviewed information with patient.  As daughter at bedside explain dying process to them.  They are currently scheduled to be enrolled in hospice at [place] with in outpatient capabilities discussed lack of food intake and IV fluids that will be done at hospice.  In talked about dying process

## 2024-12-11 PROCEDURE — 77336 RADIATION PHYSICS CONSULT: CPT | Performed by: SPECIALIST

## 2024-12-11 NOTE — PHYSICIAN QUERY
Please clarify the nutritional diagnosis associated with the clinical findings Malnutrition, unspecified degree

## 2024-12-12 LAB — BACTERIA BLD CULT: NORMAL

## 2024-12-15 LAB
BACTERIA BLD CULT: ABNORMAL

## 2024-12-16 ENCOUNTER — TELEPHONE (OUTPATIENT)
Dept: HEMATOLOGY/ONCOLOGY | Facility: CLINIC | Age: 84
End: 2024-12-16
Payer: MEDICARE

## 2024-12-16 NOTE — TELEPHONE ENCOUNTER
Call placed to daughter, Shannon, to check in on pt and verify that she did not need an appt with oncology. Dtr reports she is at home on hospice and not expected to survive much longer; updated Dr. Mckeon.  Shannon denies any need for appt but appreciates f/u. Emotional support given and encouraged her to reach out with any needs/concerns.

## 2024-12-18 NOTE — PHYSICIAN QUERY
Please clarify if there is any clinical correlation between L3 vertebral  fracture and metastatic cancer. Are the conditions:  Due to or associated with each other

## 2024-12-24 ENCOUNTER — TELEPHONE (OUTPATIENT)
Dept: INFECTIOUS DISEASES | Facility: CLINIC | Age: 84
End: 2024-12-24
Payer: MEDICARE

## 2024-12-24 NOTE — TELEPHONE ENCOUNTER
----- Message from Nurse Welch sent at 2024  5:00 PM CST -----  Pt  24 per Daughter. Chart updated.